# Patient Record
Sex: MALE | Race: WHITE | NOT HISPANIC OR LATINO | Employment: OTHER | URBAN - METROPOLITAN AREA
[De-identification: names, ages, dates, MRNs, and addresses within clinical notes are randomized per-mention and may not be internally consistent; named-entity substitution may affect disease eponyms.]

---

## 2020-03-20 ENCOUNTER — HOSPITAL ENCOUNTER (EMERGENCY)
Facility: HOSPITAL | Age: 60
Discharge: HOME | End: 2020-03-20
Attending: EMERGENCY MEDICINE | Admitting: EMERGENCY MEDICINE

## 2020-03-20 ENCOUNTER — APPOINTMENT (OUTPATIENT)
Dept: GENERAL RADIOLOGY | Facility: HOSPITAL | Age: 60
End: 2020-03-20

## 2020-03-20 VITALS
BODY MASS INDEX: 35.25 KG/M2 | OXYGEN SATURATION: 95 % | RESPIRATION RATE: 24 BRPM | DIASTOLIC BLOOD PRESSURE: 101 MMHG | HEART RATE: 67 BPM | WEIGHT: 238 LBS | TEMPERATURE: 97.7 F | SYSTOLIC BLOOD PRESSURE: 145 MMHG | HEIGHT: 69 IN

## 2020-03-20 DIAGNOSIS — R06.02 SHORTNESS OF BREATH: ICD-10-CM

## 2020-03-20 DIAGNOSIS — I10 ELEVATED BLOOD PRESSURE READING WITH DIAGNOSIS OF HYPERTENSION: ICD-10-CM

## 2020-03-20 DIAGNOSIS — Z72.0 TOBACCO USE: ICD-10-CM

## 2020-03-20 DIAGNOSIS — J98.01 BRONCHOSPASM, ACUTE: ICD-10-CM

## 2020-03-20 DIAGNOSIS — R07.89 CHEST TIGHTNESS: ICD-10-CM

## 2020-03-20 DIAGNOSIS — R05.8 PRODUCTIVE COUGH: Primary | ICD-10-CM

## 2020-03-20 LAB
ALBUMIN SERPL-MCNC: 3.9 G/DL (ref 3.5–5.2)
ALBUMIN/GLOB SERPL: 1.5 G/DL
ALP SERPL-CCNC: 58 U/L (ref 39–117)
ALT SERPL W P-5'-P-CCNC: 44 U/L (ref 1–41)
ANION GAP SERPL CALCULATED.3IONS-SCNC: 10 MMOL/L (ref 5–15)
AST SERPL-CCNC: 35 U/L (ref 1–40)
BASOPHILS # BLD AUTO: 0.02 10*3/MM3 (ref 0–0.2)
BASOPHILS NFR BLD AUTO: 0.5 % (ref 0–1.5)
BILIRUB SERPL-MCNC: 0.2 MG/DL (ref 0.2–1.2)
BILIRUB UR QL STRIP: NEGATIVE
BUN BLD-MCNC: 11 MG/DL (ref 8–23)
BUN/CREAT SERPL: 15.1 (ref 7–25)
CALCIUM SPEC-SCNC: 8.6 MG/DL (ref 8.6–10.5)
CHLORIDE SERPL-SCNC: 106 MMOL/L (ref 98–107)
CLARITY UR: CLEAR
CO2 SERPL-SCNC: 27 MMOL/L (ref 22–29)
COLOR UR: YELLOW
CREAT BLD-MCNC: 0.73 MG/DL (ref 0.76–1.27)
DEPRECATED RDW RBC AUTO: 42.8 FL (ref 37–54)
EOSINOPHIL # BLD AUTO: 0.07 10*3/MM3 (ref 0–0.4)
EOSINOPHIL NFR BLD AUTO: 1.6 % (ref 0.3–6.2)
ERYTHROCYTE [DISTWIDTH] IN BLOOD BY AUTOMATED COUNT: 12.9 % (ref 12.3–15.4)
GFR SERPL CREATININE-BSD FRML MDRD: 109 ML/MIN/1.73
GLOBULIN UR ELPH-MCNC: 2.6 GM/DL
GLUCOSE BLD-MCNC: 153 MG/DL (ref 65–99)
GLUCOSE UR STRIP-MCNC: NEGATIVE MG/DL
HCT VFR BLD AUTO: 43 % (ref 37.5–51)
HGB BLD-MCNC: 14.3 G/DL (ref 13–17.7)
HGB UR QL STRIP.AUTO: NEGATIVE
IMM GRANULOCYTES # BLD AUTO: 0.01 10*3/MM3 (ref 0–0.05)
IMM GRANULOCYTES NFR BLD AUTO: 0.2 % (ref 0–0.5)
KETONES UR QL STRIP: NEGATIVE
LEUKOCYTE ESTERASE UR QL STRIP.AUTO: NEGATIVE
LYMPHOCYTES # BLD AUTO: 1.69 10*3/MM3 (ref 0.7–3.1)
LYMPHOCYTES NFR BLD AUTO: 38.5 % (ref 19.6–45.3)
MCH RBC QN AUTO: 30.1 PG (ref 26.6–33)
MCHC RBC AUTO-ENTMCNC: 33.3 G/DL (ref 31.5–35.7)
MCV RBC AUTO: 90.5 FL (ref 79–97)
MONOCYTES # BLD AUTO: 0.4 10*3/MM3 (ref 0.1–0.9)
MONOCYTES NFR BLD AUTO: 9.1 % (ref 5–12)
NEUTROPHILS # BLD AUTO: 2.2 10*3/MM3 (ref 1.7–7)
NEUTROPHILS NFR BLD AUTO: 50.1 % (ref 42.7–76)
NITRITE UR QL STRIP: NEGATIVE
NRBC BLD AUTO-RTO: 0 /100 WBC (ref 0–0.2)
NT-PROBNP SERPL-MCNC: 456.2 PG/ML (ref 5–900)
PH UR STRIP.AUTO: 7 [PH] (ref 5–8)
PLATELET # BLD AUTO: 180 10*3/MM3 (ref 140–450)
PMV BLD AUTO: 10.9 FL (ref 6–12)
POTASSIUM BLD-SCNC: 3.9 MMOL/L (ref 3.5–5.2)
PROT SERPL-MCNC: 6.5 G/DL (ref 6–8.5)
PROT UR QL STRIP: NEGATIVE
RBC # BLD AUTO: 4.75 10*6/MM3 (ref 4.14–5.8)
SODIUM BLD-SCNC: 143 MMOL/L (ref 136–145)
SP GR UR STRIP: 1.01 (ref 1–1.03)
TROPONIN T SERPL-MCNC: <0.01 NG/ML (ref 0–0.03)
TROPONIN T SERPL-MCNC: <0.01 NG/ML (ref 0–0.03)
UROBILINOGEN UR QL STRIP: NORMAL
WBC NRBC COR # BLD: 4.39 10*3/MM3 (ref 3.4–10.8)

## 2020-03-20 PROCEDURE — 96374 THER/PROPH/DIAG INJ IV PUSH: CPT

## 2020-03-20 PROCEDURE — 80053 COMPREHEN METABOLIC PANEL: CPT | Performed by: EMERGENCY MEDICINE

## 2020-03-20 PROCEDURE — 94799 UNLISTED PULMONARY SVC/PX: CPT

## 2020-03-20 PROCEDURE — 71045 X-RAY EXAM CHEST 1 VIEW: CPT

## 2020-03-20 PROCEDURE — 99284 EMERGENCY DEPT VISIT MOD MDM: CPT

## 2020-03-20 PROCEDURE — 94640 AIRWAY INHALATION TREATMENT: CPT

## 2020-03-20 PROCEDURE — 93005 ELECTROCARDIOGRAM TRACING: CPT | Performed by: EMERGENCY MEDICINE

## 2020-03-20 PROCEDURE — 84484 ASSAY OF TROPONIN QUANT: CPT | Performed by: EMERGENCY MEDICINE

## 2020-03-20 PROCEDURE — 25010000002 METHYLPREDNISOLONE PER 125 MG: Performed by: EMERGENCY MEDICINE

## 2020-03-20 PROCEDURE — 85025 COMPLETE CBC W/AUTO DIFF WBC: CPT | Performed by: EMERGENCY MEDICINE

## 2020-03-20 PROCEDURE — 94664 DEMO&/EVAL PT USE INHALER: CPT

## 2020-03-20 PROCEDURE — 81003 URINALYSIS AUTO W/O SCOPE: CPT | Performed by: EMERGENCY MEDICINE

## 2020-03-20 PROCEDURE — 83880 ASSAY OF NATRIURETIC PEPTIDE: CPT | Performed by: EMERGENCY MEDICINE

## 2020-03-20 RX ORDER — ALBUTEROL SULFATE 90 UG/1
2 AEROSOL, METERED RESPIRATORY (INHALATION) EVERY 4 HOURS PRN
Qty: 1 INHALER | Refills: 0 | Status: SHIPPED | OUTPATIENT
Start: 2020-03-20 | End: 2021-07-16 | Stop reason: SDUPTHER

## 2020-03-20 RX ORDER — BENZONATATE 200 MG/1
200 CAPSULE ORAL 3 TIMES DAILY PRN
Qty: 21 CAPSULE | Refills: 0 | Status: SHIPPED | OUTPATIENT
Start: 2020-03-20 | End: 2020-04-28

## 2020-03-20 RX ORDER — IPRATROPIUM BROMIDE AND ALBUTEROL SULFATE 2.5; .5 MG/3ML; MG/3ML
3 SOLUTION RESPIRATORY (INHALATION) ONCE
Status: COMPLETED | OUTPATIENT
Start: 2020-03-20 | End: 2020-03-20

## 2020-03-20 RX ORDER — PREDNISONE 20 MG/1
20 TABLET ORAL DAILY
Qty: 5 TABLET | Refills: 0 | Status: SHIPPED | OUTPATIENT
Start: 2020-03-20 | End: 2020-04-28

## 2020-03-20 RX ORDER — BUPRENORPHINE HYDROCHLORIDE AND NALOXONE HYDROCHLORIDE DIHYDRATE 8; 2 MG/1; MG/1
0.5 TABLET SUBLINGUAL DAILY
COMMUNITY
End: 2020-12-26

## 2020-03-20 RX ORDER — SODIUM CHLORIDE 0.9 % (FLUSH) 0.9 %
10 SYRINGE (ML) INJECTION AS NEEDED
Status: DISCONTINUED | OUTPATIENT
Start: 2020-03-20 | End: 2020-03-20 | Stop reason: HOSPADM

## 2020-03-20 RX ORDER — AZITHROMYCIN 250 MG/1
TABLET, FILM COATED ORAL
Qty: 6 TABLET | Refills: 0 | Status: SHIPPED | OUTPATIENT
Start: 2020-03-20 | End: 2020-04-28

## 2020-03-20 RX ORDER — METHYLPREDNISOLONE SODIUM SUCCINATE 125 MG/2ML
125 INJECTION, POWDER, LYOPHILIZED, FOR SOLUTION INTRAMUSCULAR; INTRAVENOUS ONCE
Status: COMPLETED | OUTPATIENT
Start: 2020-03-20 | End: 2020-03-20

## 2020-03-20 RX ADMIN — IPRATROPIUM BROMIDE AND ALBUTEROL SULFATE 3 ML: 2.5; .5 SOLUTION RESPIRATORY (INHALATION) at 11:16

## 2020-03-20 RX ADMIN — METHYLPREDNISOLONE SODIUM SUCCINATE 125 MG: 125 INJECTION, POWDER, FOR SOLUTION INTRAMUSCULAR; INTRAVENOUS at 10:27

## 2020-04-27 NOTE — PROGRESS NOTES
"[facility]  [facility]  Telemedicine note    04/28/2020         Alejo Condon  [address] [place] [address]  [unfilled]    1960    Provider, No Known    Alejo Condon is a 60 y.o. male.      Subjective:     Chief Complaint:  Shortness of Breath     You have chosen to receive care through a telephone visit. Do you consent to use a telephone visit for your medical care today? YES    This was an audio enabled telemedicine encounter.    HPI   Patient is a 61 yo male with history of HTN scheduled today for telemedicine evaluation of shortness of breath at the request of Dr. Stevens. Patient presented to the ED 3/20 with a 2 wk hx of constant shortness of breath that was worsening. He notes shortness of breath even at rest but worse with exertion. He notes associated wheezing, diaphoresis, non-productive cough and nausea. Denies chest pain. He has tried albuterol inhaler with minimal relief. He does have a history of pneumonia and bronchitis, but denies asthma. He is an occasional smoker since 9 years old. Work up in ED unremarkable. He was treated with neb treatment, zpack, prednisone and tessalon perles. He later saw the audiologist and was diagnosed with a perforated tympanic membrane. After treatment from the ED he reports his shortness of breath has improved. He reports that he is active with some dyspnea but feels this is improved since treatment for bronchitis. He reports history of an \"enlarged heart\" according to CXRs but does not recall having an echo.   He reports he has HTN but does not take medication. He says his BP typically runs around 150 systolic when it is checked at the suboxone clinic.   He notes occasional ankle swelling and palpitations but neither are bothersome.   Cardiac risks: HTN, hyperlipidemia, age, gender, obesity, tobacco use    Patient Active Problem List   Diagnosis   • Hypertension   • Hyperlipidemia       Past Medical " History:   Diagnosis Date   • Hyperlipidemia    • Hypertension    • Opiate addiction (CMS/HCC)     Resolved since on suboxone       Past Surgical History:   Procedure Laterality Date   • FINGER SURGERY         Family History   Problem Relation Age of Onset   • Hypertension Mother    • Hyperlipidemia Mother    • Heart attack Father 88   • Cardiomyopathy Brother 36        Patient reports he had SCD that they attributed to agent orange       Social History     Socioeconomic History   • Marital status:      Spouse name: Not on file   • Number of children: Not on file   • Years of education: Not on file   • Highest education level: Not on file   Tobacco Use   • Smoking status: Current Some Day Smoker     Packs/day: 0.50   Substance and Sexual Activity   • Alcohol use: Yes     Alcohol/week: 2.0 standard drinks     Types: 2 Cans of beer per week     Drinks per session: 1 or 2     Binge frequency: Never   • Drug use: Yes     Types: Marijuana       No Known Allergies      Current Outpatient Medications:   •  ofloxacin (FLOXIN) 0.3 % otic solution, Administer 5 drops into the left ear Daily., Disp: , Rfl:   •  albuterol sulfate  (90 Base) MCG/ACT inhaler, Inhale 2 puffs Every 4 (Four) Hours As Needed for Wheezing or Shortness of Air., Disp: 1 inhaler, Rfl: 0  •  buprenorphine-naloxone (SUBOXONE) 8-2 MG per SL tablet, Place 0.5 tablets under the tongue Daily., Disp: , Rfl:   •  losartan (Cozaar) 25 MG tablet, Take 1 tablet by mouth Daily., Disp: 30 tablet, Rfl: 3    The following portions of the patient's history were reviewed today and updated as appropriate: allergies, current medications, past family history, past medical history, past social history, past surgical history and problem list     Review of Systems   Constitution: Positive for night sweats. Negative for chills and fever.   HENT: Positive for ear pain.    Eyes: Negative.    Cardiovascular: Positive for dyspnea on exertion, leg swelling  "(occasional) and palpitations. Negative for chest pain, claudication, cyanosis, irregular heartbeat, near-syncope, orthopnea, paroxysmal nocturnal dyspnea and syncope.   Respiratory: Negative for cough, shortness of breath and snoring.    Endocrine: Negative.    Hematologic/Lymphatic: Does not bruise/bleed easily.   Skin: Negative for poor wound healing.   Musculoskeletal: Negative.    Gastrointestinal: Negative for abdominal pain, heartburn, hematemesis, melena, nausea and vomiting.   Genitourinary: Negative.  Negative for hematuria.   Neurological: Negative.  Negative for dizziness and light-headedness.   Psychiatric/Behavioral: Positive for substance abuse (marijuana). The patient is nervous/anxious.    Allergic/Immunologic: Negative.        Objective:     Vitals:    04/28/20 0853   Weight: 106 kg (234 lb)   Height: 175.3 cm (69\")       Body mass index is 34.56 kg/m².    Physical Exam   Constitutional: He is oriented to person, place, and time.   Neurological: He is alert and oriented to person, place, and time.   Psychiatric: He has a normal mood and affect. His behavior is normal. Judgment and thought content normal.       Lab and Diagnostic Review:  Results for orders placed or performed during the hospital encounter of 03/20/20   Comprehensive Metabolic Panel   Result Value Ref Range    Glucose 153 (H) 65 - 99 mg/dL    BUN 11 8 - 23 mg/dL    Creatinine 0.73 (L) 0.76 - 1.27 mg/dL    Sodium 143 136 - 145 mmol/L    Potassium 3.9 3.5 - 5.2 mmol/L    Chloride 106 98 - 107 mmol/L    CO2 27.0 22.0 - 29.0 mmol/L    Calcium 8.6 8.6 - 10.5 mg/dL    Total Protein 6.5 6.0 - 8.5 g/dL    Albumin 3.90 3.50 - 5.20 g/dL    ALT (SGPT) 44 (H) 1 - 41 U/L    AST (SGOT) 35 1 - 40 U/L    Alkaline Phosphatase 58 39 - 117 U/L    Total Bilirubin 0.2 0.2 - 1.2 mg/dL    eGFR Non African Amer 109 >60 mL/min/1.73    Globulin 2.6 gm/dL    A/G Ratio 1.5 g/dL    BUN/Creatinine Ratio 15.1 7.0 - 25.0    Anion Gap 10.0 5.0 - 15.0 mmol/L "   Urinalysis With Microscopic If Indicated (No Culture) - Urine, Clean Catch   Result Value Ref Range    Color, UA Yellow Yellow, Straw    Appearance, UA Clear Clear    pH, UA 7.0 5.0 - 8.0    Specific Gravity, UA 1.014 1.001 - 1.030    Glucose, UA Negative Negative    Ketones, UA Negative Negative    Bilirubin, UA Negative Negative    Blood, UA Negative Negative    Protein, UA Negative Negative    Leuk Esterase, UA Negative Negative    Nitrite, UA Negative Negative    Urobilinogen, UA 0.2 E.U./dL 0.2 - 1.0 E.U./dL   BNP   Result Value Ref Range    proBNP 456.2 5.0 - 900.0 pg/mL   Troponin   Result Value Ref Range    Troponin T <0.010 0.000 - 0.030 ng/mL   CBC Auto Differential   Result Value Ref Range    WBC 4.39 3.40 - 10.80 10*3/mm3    RBC 4.75 4.14 - 5.80 10*6/mm3    Hemoglobin 14.3 13.0 - 17.7 g/dL    Hematocrit 43.0 37.5 - 51.0 %    MCV 90.5 79.0 - 97.0 fL    MCH 30.1 26.6 - 33.0 pg    MCHC 33.3 31.5 - 35.7 g/dL    RDW 12.9 12.3 - 15.4 %    RDW-SD 42.8 37.0 - 54.0 fl    MPV 10.9 6.0 - 12.0 fL    Platelets 180 140 - 450 10*3/mm3    Neutrophil % 50.1 42.7 - 76.0 %    Lymphocyte % 38.5 19.6 - 45.3 %    Monocyte % 9.1 5.0 - 12.0 %    Eosinophil % 1.6 0.3 - 6.2 %    Basophil % 0.5 0.0 - 1.5 %    Immature Grans % 0.2 0.0 - 0.5 %    Neutrophils, Absolute 2.20 1.70 - 7.00 10*3/mm3    Lymphocytes, Absolute 1.69 0.70 - 3.10 10*3/mm3    Monocytes, Absolute 0.40 0.10 - 0.90 10*3/mm3    Eosinophils, Absolute 0.07 0.00 - 0.40 10*3/mm3    Basophils, Absolute 0.02 0.00 - 0.20 10*3/mm3    Immature Grans, Absolute 0.01 0.00 - 0.05 10*3/mm3    nRBC 0.0 0.0 - 0.2 /100 WBC   Troponin   Result Value Ref Range    Troponin T <0.010 0.000 - 0.030 ng/mL   CXR 3/20/20  Heart is enlarged with chronic changes seen within the lung  fields and slight prominence of the pulmonary vascularity. No acute  parenchymal disease.    EKG 3/20/20   Normal sinus rhythm  T wave abnormality, consider lateral ischemia  Prolonged QT    Assessment and Plan:    1. DAMIAN (dyspnea on exertion)  Symptoms improved after treatment for bronchitis. However, due to cardiomegaly on CXR and family hx will proceed with echo.   - Adult Transthoracic Echo Complete W/ Cont if Necessary Per Protocol; Future    2. Essential hypertension  Uncontrolled. Reports that he stopped taking antihypertensives because he didn't like how they made him feel. He believes he was on lisinopril. Due to potential cardiomyopathy, will try losartan. If echo WNL and if he doesn't tolerate, will consider amlodipine  Continue to monitor closely at suboxone clinic visits  - Adult Transthoracic Echo Complete W/ Cont if Necessary Per Protocol; Future    3. Cardiomegaly  - Adult Transthoracic Echo Complete W/ Cont if Necessary Per Protocol; Future    4. Prolonged Q-T interval on ECG  Will repeat EKG at next office visit    Patient to fu in 4-6 weeks in office with EKG. Will consider further cardiac evaluation at that time pending echo results and symptoms      This visit has been scheduled as a telephonevisit to comply with patient safety concerns in accordance with CDC recommendations. Total time of discussion was 30 minutes.      It has been a pleasure to participate in the care of this patient.  Patient was instructed to call the [facility] with any questions, concerns, or worsening symptoms.    *Please note that portions of this note were completed with a voice recognition program. Efforts were made to edit the dictations, but occasionally words are mistranscribed.

## 2020-04-28 ENCOUNTER — OFFICE VISIT (OUTPATIENT)
Dept: CARDIOLOGY | Facility: HOSPITAL | Age: 60
End: 2020-04-28

## 2020-04-28 VITALS — HEIGHT: 69 IN | BODY MASS INDEX: 34.66 KG/M2 | WEIGHT: 234 LBS

## 2020-04-28 DIAGNOSIS — R06.09 DOE (DYSPNEA ON EXERTION): Primary | ICD-10-CM

## 2020-04-28 DIAGNOSIS — I51.7 CARDIOMEGALY: ICD-10-CM

## 2020-04-28 DIAGNOSIS — R94.31 PROLONGED Q-T INTERVAL ON ECG: ICD-10-CM

## 2020-04-28 DIAGNOSIS — I10 ESSENTIAL HYPERTENSION: ICD-10-CM

## 2020-04-28 PROCEDURE — 99443 PR PHYS/QHP TELEPHONE EVALUATION 21-30 MIN: CPT | Performed by: NURSE PRACTITIONER

## 2020-04-28 RX ORDER — OFLOXACIN 3 MG/ML
5 SOLUTION AURICULAR (OTIC) DAILY
COMMUNITY
End: 2020-05-29

## 2020-04-28 RX ORDER — LOSARTAN POTASSIUM 25 MG/1
25 TABLET ORAL DAILY
Qty: 30 TABLET | Refills: 3 | Status: SHIPPED | OUTPATIENT
Start: 2020-04-28 | End: 2020-05-06 | Stop reason: ALTCHOICE

## 2020-05-06 ENCOUNTER — HOSPITAL ENCOUNTER (OUTPATIENT)
Dept: CARDIOLOGY | Facility: HOSPITAL | Age: 60
Discharge: HOME | End: 2020-05-06
Admitting: NURSE PRACTITIONER

## 2020-05-06 ENCOUNTER — TELEPHONE (OUTPATIENT)
Dept: CARDIOLOGY | Facility: HOSPITAL | Age: 60
End: 2020-05-06

## 2020-05-06 VITALS
HEIGHT: 69 IN | BODY MASS INDEX: 34.29 KG/M2 | SYSTOLIC BLOOD PRESSURE: 149 MMHG | WEIGHT: 231.48 LBS | DIASTOLIC BLOOD PRESSURE: 100 MMHG

## 2020-05-06 DIAGNOSIS — I10 ESSENTIAL HYPERTENSION: ICD-10-CM

## 2020-05-06 DIAGNOSIS — I50.21 ACUTE SYSTOLIC CHF (CONGESTIVE HEART FAILURE) (HCC): Primary | ICD-10-CM

## 2020-05-06 DIAGNOSIS — Z00.00 HEALTH CARE MAINTENANCE: ICD-10-CM

## 2020-05-06 DIAGNOSIS — R06.09 DOE (DYSPNEA ON EXERTION): ICD-10-CM

## 2020-05-06 DIAGNOSIS — I51.7 CARDIOMEGALY: ICD-10-CM

## 2020-05-06 LAB
BH CV ECHO MEAS - ACS: 1.9 CM
BH CV ECHO MEAS - AO MAX PG (FULL): 3.3 MMHG
BH CV ECHO MEAS - AO MAX PG: 4.6 MMHG
BH CV ECHO MEAS - AO MEAN PG (FULL): 1.6 MMHG
BH CV ECHO MEAS - AO MEAN PG: 2.3 MMHG
BH CV ECHO MEAS - AO ROOT AREA (BSA CORRECTED): 1.6
BH CV ECHO MEAS - AO ROOT AREA: 10 CM^2
BH CV ECHO MEAS - AO ROOT DIAM: 3.6 CM
BH CV ECHO MEAS - AO V2 MAX: 107.6 CM/SEC
BH CV ECHO MEAS - AO V2 MEAN: 70.1 CM/SEC
BH CV ECHO MEAS - AO V2 VTI: 21.7 CM
BH CV ECHO MEAS - ASC AORTA: 3.4 CM
BH CV ECHO MEAS - AVA(I,A): 2.8 CM^2
BH CV ECHO MEAS - AVA(I,D): 2.8 CM^2
BH CV ECHO MEAS - AVA(V,A): 2.3 CM^2
BH CV ECHO MEAS - AVA(V,D): 2.3 CM^2
BH CV ECHO MEAS - BSA(HAYCOCK): 2.3 M^2
BH CV ECHO MEAS - BSA: 2.2 M^2
BH CV ECHO MEAS - BZI_BMI: 34.4 KILOGRAMS/M^2
BH CV ECHO MEAS - BZI_METRIC_HEIGHT: 175.3 CM
BH CV ECHO MEAS - BZI_METRIC_WEIGHT: 105.7 KG
BH CV ECHO MEAS - EDV(CUBED): 201.5 ML
BH CV ECHO MEAS - EDV(MOD-SP2): 200 ML
BH CV ECHO MEAS - EDV(MOD-SP4): 197 ML
BH CV ECHO MEAS - EDV(TEICH): 170.7 ML
BH CV ECHO MEAS - EF(CUBED): 33.5 %
BH CV ECHO MEAS - EF(MOD-BP): 31 %
BH CV ECHO MEAS - EF(MOD-SP2): 27.5 %
BH CV ECHO MEAS - EF(MOD-SP4): 31.5 %
BH CV ECHO MEAS - EF(TEICH): 26.9 %
BH CV ECHO MEAS - ESV(CUBED): 134.1 ML
BH CV ECHO MEAS - ESV(MOD-SP2): 145 ML
BH CV ECHO MEAS - ESV(MOD-SP4): 135 ML
BH CV ECHO MEAS - ESV(TEICH): 124.8 ML
BH CV ECHO MEAS - FS: 12.7 %
BH CV ECHO MEAS - IVS/LVPW: 0.95
BH CV ECHO MEAS - IVSD: 0.99 CM
BH CV ECHO MEAS - LA DIMENSION: 4.3 CM
BH CV ECHO MEAS - LA/AO: 1.2
BH CV ECHO MEAS - LAD MAJOR: 6 CM
BH CV ECHO MEAS - LAT PEAK E' VEL: 5.3 CM/SEC
BH CV ECHO MEAS - LATERAL E/E' RATIO: 19.6
BH CV ECHO MEAS - LV DIASTOLIC VOL/BSA (35-75): 89.4 ML/M^2
BH CV ECHO MEAS - LV MASS(C)D: 241.3 GRAMS
BH CV ECHO MEAS - LV MASS(C)DI: 109.5 GRAMS/M^2
BH CV ECHO MEAS - LV MAX PG: 1.3 MMHG
BH CV ECHO MEAS - LV MEAN PG: 0.67 MMHG
BH CV ECHO MEAS - LV SYSTOLIC VOL/BSA (12-30): 61.2 ML/M^2
BH CV ECHO MEAS - LV V1 MAX: 57.3 CM/SEC
BH CV ECHO MEAS - LV V1 MEAN: 37.7 CM/SEC
BH CV ECHO MEAS - LV V1 VTI: 13.8 CM
BH CV ECHO MEAS - LVIDD: 5.9 CM
BH CV ECHO MEAS - LVIDS: 5.1 CM
BH CV ECHO MEAS - LVLD AP2: 9.8 CM
BH CV ECHO MEAS - LVLD AP4: 9.2 CM
BH CV ECHO MEAS - LVLS AP2: 8.8 CM
BH CV ECHO MEAS - LVLS AP4: 8.3 CM
BH CV ECHO MEAS - LVOT AREA (M): 4.5 CM^2
BH CV ECHO MEAS - LVOT AREA: 4.4 CM^2
BH CV ECHO MEAS - LVOT DIAM: 2.4 CM
BH CV ECHO MEAS - LVPWD: 1 CM
BH CV ECHO MEAS - MED PEAK E' VEL: 4.3 CM/SEC
BH CV ECHO MEAS - MEDIAL E/E' RATIO: 23.9
BH CV ECHO MEAS - MV A MAX VEL: 41.5 CM/SEC
BH CV ECHO MEAS - MV DEC SLOPE: 256.2 CM/SEC^2
BH CV ECHO MEAS - MV DEC TIME: 0.17 SEC
BH CV ECHO MEAS - MV E MAX VEL: 105.1 CM/SEC
BH CV ECHO MEAS - MV E/A: 2.5
BH CV ECHO MEAS - MV P1/2T MAX VEL: 118.3 CM/SEC
BH CV ECHO MEAS - MV P1/2T: 135.3 MSEC
BH CV ECHO MEAS - MVA P1/2T LCG: 1.9 CM^2
BH CV ECHO MEAS - MVA(P1/2T): 1.6 CM^2
BH CV ECHO MEAS - PA ACC SLOPE: 758.9 CM/SEC^2
BH CV ECHO MEAS - PA ACC TIME: 0.1 SEC
BH CV ECHO MEAS - PA PR(ACCEL): 33.1 MMHG
BH CV ECHO MEAS - RAP SYSTOLE: 8 MMHG
BH CV ECHO MEAS - RVSP: 34 MMHG
BH CV ECHO MEAS - SI(AO): 98.9 ML/M^2
BH CV ECHO MEAS - SI(CUBED): 30.6 ML/M^2
BH CV ECHO MEAS - SI(LVOT): 27.7 ML/M^2
BH CV ECHO MEAS - SI(MOD-SP2): 25 ML/M^2
BH CV ECHO MEAS - SI(MOD-SP4): 28.1 ML/M^2
BH CV ECHO MEAS - SI(TEICH): 20.8 ML/M^2
BH CV ECHO MEAS - SV(AO): 218.1 ML
BH CV ECHO MEAS - SV(CUBED): 67.4 ML
BH CV ECHO MEAS - SV(LVOT): 61 ML
BH CV ECHO MEAS - SV(MOD-SP2): 55 ML
BH CV ECHO MEAS - SV(MOD-SP4): 62 ML
BH CV ECHO MEAS - SV(TEICH): 45.9 ML
BH CV ECHO MEAS - TAPSE (>1.6): 2.3 CM2
BH CV ECHO MEAS - TR MAX PG: 26 MMHG
BH CV ECHO MEAS - TR MAX VEL: 254 CM/SEC
BH CV ECHO MEASUREMENTS AVERAGE E/E' RATIO: 21.9
BH CV VAS BP LEFT ARM: NORMAL MMHG
BH CV XLRA - RV BASE: 4.6 CM
BH CV XLRA - RV LENGTH: 7.8 CM
BH CV XLRA - RV MID: 4.5 CM
LEFT ATRIUM VOLUME INDEX: 45.8 ML/M^2
LEFT ATRIUM VOLUME: 101 ML

## 2020-05-06 PROCEDURE — 93306 TTE W/DOPPLER COMPLETE: CPT | Performed by: INTERNAL MEDICINE

## 2020-05-06 PROCEDURE — 93306 TTE W/DOPPLER COMPLETE: CPT

## 2020-05-06 RX ORDER — CARVEDILOL 3.12 MG/1
3.12 TABLET ORAL 2 TIMES DAILY
Qty: 180 TABLET | Refills: 3 | Status: ON HOLD | OUTPATIENT
Start: 2020-05-06 | End: 2020-08-25 | Stop reason: SDUPTHER

## 2020-05-06 NOTE — TELEPHONE ENCOUNTER
"Called patient to review echo results which were abnormal for an EF of 31-35%, global hypokinesis, grade III diastolic dysfunction with high LAP. Patient reports that he was told his heart was enlarged years ago but never told he had CHF and has never seen a cardiologist. Discussed CHF including possible causes, s/s and risks. Recommend ischemic evaluation and referral to cardiology for urgent LHC. I also recommended starting Entresto and carvedilol. Of note, he never picked up prescription for losartan. I have called the pharmacy and canceled this prescription. Advised to  prescriptions ASAP. I discussed risks of arrhythmias and SCD. Recommended LifeVest and he deferred. He says he feels fine and \"I am not sure I really give a damn\". He says when it is his time to go he wants to go. I have recommended that he come into the clinic this week to discuss findings, medications and future care as well as evaluate BP since I have never seen this patient face to face. He agrees to this.     "

## 2020-05-07 PROBLEM — I42.0 CARDIOMYOPATHY, DILATED: Status: ACTIVE | Noted: 2020-05-07

## 2020-05-08 ENCOUNTER — OFFICE VISIT (OUTPATIENT)
Dept: CARDIOLOGY | Facility: HOSPITAL | Age: 60
End: 2020-05-08

## 2020-05-08 ENCOUNTER — HOSPITAL ENCOUNTER (OUTPATIENT)
Dept: CARDIOLOGY | Facility: HOSPITAL | Age: 60
Discharge: HOME | End: 2020-05-08
Admitting: NURSE PRACTITIONER

## 2020-05-08 VITALS
BODY MASS INDEX: 33.79 KG/M2 | OXYGEN SATURATION: 97 % | SYSTOLIC BLOOD PRESSURE: 142 MMHG | HEART RATE: 70 BPM | DIASTOLIC BLOOD PRESSURE: 84 MMHG | HEIGHT: 69 IN | TEMPERATURE: 97.2 F | WEIGHT: 228.13 LBS | RESPIRATION RATE: 18 BRPM

## 2020-05-08 DIAGNOSIS — I42.0 CARDIOMYOPATHY, DILATED (HCC): ICD-10-CM

## 2020-05-08 DIAGNOSIS — I50.21 ACUTE SYSTOLIC CHF (CONGESTIVE HEART FAILURE) (HCC): Primary | ICD-10-CM

## 2020-05-08 DIAGNOSIS — R94.31 PROLONGED Q-T INTERVAL ON ECG: ICD-10-CM

## 2020-05-08 DIAGNOSIS — E78.5 HYPERLIPIDEMIA, UNSPECIFIED HYPERLIPIDEMIA TYPE: ICD-10-CM

## 2020-05-08 DIAGNOSIS — I10 ESSENTIAL HYPERTENSION: ICD-10-CM

## 2020-05-08 PROCEDURE — 93005 ELECTROCARDIOGRAM TRACING: CPT | Performed by: NURSE PRACTITIONER

## 2020-05-08 PROCEDURE — 93010 ELECTROCARDIOGRAM REPORT: CPT | Performed by: INTERNAL MEDICINE

## 2020-05-08 PROCEDURE — 99214 OFFICE O/P EST MOD 30 MIN: CPT | Performed by: NURSE PRACTITIONER

## 2020-05-08 RX ORDER — SPIRONOLACTONE 25 MG/1
25 TABLET ORAL DAILY
Qty: 30 TABLET | Refills: 3 | Status: SHIPPED | OUTPATIENT
Start: 2020-05-08 | End: 2020-12-26

## 2020-05-08 NOTE — PROGRESS NOTES
"[facility]  [facility]      2020         Alejo Condon  [address] [place] [address]  [unfilled]    1960    Provider, No Known    Alejo Condon is a 60 y.o. male.      Subjective:     Chief Complaint:  Shortness of Breath and Follow-up       HPI   Patient is a 60-year-old male with history of hypertension, hyperlipidemia and previous opiate addiction currently on Suboxone who presents today for evaluation of newly diagnosed systolic heart failure.  Patient presented the ED on 3/20 with a 2-week history of shortness of breath and was treated with nebulizers, Z-Khang, prednisone and Tessalon Perles and symptoms improved.  His chest x-ray showed cardiomegaly and therefore an echo was performed showing a moderately decreased LVEF of 31 to 35%, global left ventricular hypokinesis, grade 3 diastolic dysfunction with high LAP and moderately increased left atrial volume, moderately dilated RV. He was started on entresto and coreg 5/6 over the phone and was advised to follow up in clinic for in-person evaluation.     He says he has had an enlarged heart for years-found on [facility]R at [facility]. He says he had an echo 30 years ago and was told it was enlarged but was never told he had CHF or referred to cardiology. No hx of [facility]. His brother had an \"enlarged heart\" but he was told it was due to agent orange, he  at age 36. He reports high blood pressure for past few years, he says he is not \"big on taking medicines\" so he never took them regularly.  To heavy alcohol abuse.  Previously was drinking 1/5 of liquor currently drinking about 12 beers a day.  He smokes about a half a pack a day.  He denies any significant shortness of breath.  He reports that he sleeps on an incline chronically due to history of GERD and out of fear of aspiration when he drinks heavily.  He is not aware of any significant weight gain.  He does not have a blood pressure cuff but reports systolic " blood pressure typically in the 170s at his Suboxone clinic.  He has a history of opiate abuse but denies any current illicit drug use.     Cardiac risks: HTN, HLD, tobacco abuse, age, obesity  Patient Active Problem List   Diagnosis   • Hypertension   • Hyperlipidemia   • Cardiomyopathy, dilated (CMS/HCC)       Past Medical History:   Diagnosis Date   • Hyperlipidemia    • Hypertension    • Opiate addiction (CMS/HCC)     Resolved since on suboxone       Past Surgical History:   Procedure Laterality Date   • FINGER SURGERY         Family History   Problem Relation Age of Onset   • Hypertension Mother    • Hyperlipidemia Mother    • Heart failure Mother    • Heart attack Father 88   • Cardiomyopathy Brother 36        Patient reports he had SCD that they attributed to agent orange       Social History     Socioeconomic History   • Marital status:      Spouse name: Not on file   • Number of children: Not on file   • Years of education: Not on file   • Highest education level: Not on file   Tobacco Use   • Smoking status: Current Every Day Smoker     Packs/day: 0.50   • Smokeless tobacco: Never Used   Substance and Sexual Activity   • Alcohol use: Yes     Alcohol/week: 12.0 standard drinks     Types: 12 Cans of beer per week     Drinks per session: 10 or more     Binge frequency: Never   • Drug use: Yes     Types: Marijuana   • Sexual activity: Defer   Social History Narrative    caffeine use: 2 servings of coffee daily       No Known Allergies      Current Outpatient Medications:   •  albuterol sulfate  (90 Base) MCG/ACT inhaler, Inhale 2 puffs Every 4 (Four) Hours As Needed for Wheezing or Shortness of Air., Disp: 1 inhaler, Rfl: 0  •  buprenorphine-naloxone (SUBOXONE) 8-2 MG per SL tablet, Place 0.5 tablets under the tongue Daily., Disp: , Rfl:   •  carvedilol (COREG) 3.125 MG tablet, Take 1 tablet by mouth 2 (Two) Times a Day., Disp: 180 tablet, Rfl: 3  •  ofloxacin (FLOXIN) 0.3 % otic solution,  "Administer 5 drops into the left ear Daily., Disp: , Rfl:   •  sacubitril-valsartan (Entresto) 24-26 MG tablet, Take 1 tablet by mouth 2 (Two) Times a Day., Disp: 60 tablet, Rfl: 3  •  spironolactone (ALDACTONE) 25 MG tablet, Take 1 tablet by mouth Daily., Disp: 30 tablet, Rfl: 3    The following portions of the patient's history were reviewed today and updated as appropriate: allergies, current medications, past family history, past medical history, past social history, past surgical history and problem list     Review of Systems   Constitution: Positive for malaise/fatigue. Negative for chills and fever.   HENT: Negative.    Eyes: Negative.    Cardiovascular: Positive for dyspnea on exertion. Negative for chest pain, claudication, cyanosis, irregular heartbeat, leg swelling, near-syncope, orthopnea, palpitations, paroxysmal nocturnal dyspnea and syncope.   Respiratory: Negative for cough, shortness of breath and snoring.    Endocrine: Negative.    Hematologic/Lymphatic: Does not bruise/bleed easily.   Skin: Negative for poor wound healing.   Musculoskeletal: Negative.    Gastrointestinal: Negative for abdominal pain, heartburn, hematemesis, melena, nausea and vomiting.   Genitourinary: Negative.  Negative for hematuria.   Neurological: Negative.    Psychiatric/Behavioral: Negative.    Allergic/Immunologic: Negative.        Objective:     Vitals:    05/08/20 0842   BP: 142/84   BP Location: Left arm   Patient Position: Sitting   Cuff Size: Adult   Pulse: 70   Resp: 18   Temp: 97.2 °F (36.2 °C)   TempSrc: Temporal   SpO2: 97%   Weight: 103 kg (228 lb 2 oz)   Height: 175.3 cm (69\")       Body mass index is 33.69 kg/m².    Physical Exam   Constitutional: He is oriented to person, place, and time. He appears well-developed and well-nourished. No distress.   HENT:   Head: Normocephalic.   Eyes: Pupils are equal, round, and reactive to light. Conjunctivae are normal.   Neck: Neck supple. No JVD present. No thyromegaly " present.   Cardiovascular: Normal rate, regular rhythm, normal heart sounds and intact distal pulses. Exam reveals no gallop and no friction rub.   No murmur heard.  Pulmonary/Chest: Effort normal. No respiratory distress. He has decreased breath sounds. He has wheezes. He has no rales. He exhibits no tenderness.   Abdominal: Soft. Bowel sounds are normal.   Musculoskeletal: Normal range of motion. He exhibits edema (2+ BLE).   Neurological: He is alert and oriented to person, place, and time.   Skin: Skin is warm and dry.   Psychiatric: He has a normal mood and affect. His behavior is normal. Thought content normal.   Vitals reviewed.      Lab and Diagnostic Review:  CXR 3/20/20  IMPRESSION:  Heart is enlarged with chronic changes seen within the lung  fields and slight prominence of the pulmonary vascularity. No acute  parenchymal disease.  Lab Results   Component Value Date    GLUCOSE 153 (H) 03/20/2020    CALCIUM 8.6 03/20/2020     03/20/2020    K 3.9 03/20/2020    CO2 27.0 03/20/2020     03/20/2020    BUN 11 03/20/2020    CREATININE 0.73 (L) 03/20/2020    EGFRIFNONA 109 03/20/2020    BCR 15.1 03/20/2020    ANIONGAP 10.0 03/20/2020   Echo 5/6/20  · Left ventricular systolic function is moderately decreased. Calculated EF = 31.0%. Estimated EF appears to be in the range of 31 - 35%.  · There is left ventricular global hypokinesis noted. The left ventricular cavity is borderline dilated.  · Left ventricular diastolic dysfunction is noted (grade III w/high LAP) consistent with reversible restrictive pattern  · Left atrial volume is moderately increased.  · Mild tricuspid valve regurgitation is present. Estimated right ventricular systolic pressure from tricuspid regurgitation is normal (<35 mmHg).    EKG: Normal sinus rhythm, T wave abnormality consider lateral ischemia.  Prolonged QT     Assessment and Plan:   1. Acute systolic CHF (congestive heart failure) (CMS/HCC)  Appears mildly fluid  "overloaded  Will add spironolactone, but will likely need lasix. He admits he doesn't like taking medicines, but I have talked to him extensively about his diagnosis and risks of not treating  Heart failure education provided today including signs and symptoms, causes of heart failure, medications, daily weights, low sodium diet (less than 2000 mg per day), 1.5L fluid restriction and daily physical activity as tolerated. Reviewed HF Zones with patient   Encouraged alcohol cessation  Continue coreg and entresto, will increase at next visit pending blood pressure response and labs after the addition of spironolactone  Urgent referral placed for cardiology evaluation and C    2. Cardiomyopathy, dilated (CMS/HCC)  EF 31-35%. Fhx of dilated cardiomyopathy in family. Patient has known hx of ETOH abuse  Needs ischemic evaluation  I have again discussed risks for SCD and recommended LifeVest. He wants to think about it. He understands risks, but would prefer to \"research\" it first    3. Essential hypertension  Uncontrolled.  Add spironolactone.  Will likely uptitrate his carvedilol and or Entresto at next visit.  Provided patient with a free blood pressure cuff.  Encourage daily monitoring and to call if systolic blood pressure consistently greater than 130    4. Hyperlipidemia  We will check lipids at next visit    RV 1 week    It has been a pleasure to participate in the care of this patient.  Patient was instructed to call the [facility] with any questions, concerns, or worsening symptoms.    *Please note that portions of this note were completed with a voice recognition program. Efforts were made to edit the dictations, but occasionally words are mistranscribed.      "

## 2020-05-12 NOTE — TELEPHONE ENCOUNTER
Called patient back and discussed LifeVest. He is willing to wear. I explained to him that I do not know what is copay is and he will have to check with his insurance. Advised him to not donate blood. He says that his BPs are improving with SBPs in the 130-140s and HRs in the 60s. He is to see Dr. Trejo on Thursday. Advised him to have labs rechecked at that time and Dr. Trejo or myself will consider uptitrating therapy or possibly starting lasix. Patient to call with any new or worsening symptoms

## 2020-05-12 NOTE — TELEPHONE ENCOUNTER
Patient called and states that he is wanting to get more information on how he gets a Lifevest. He states that he is interested in getting fitted. His main concerns were insurance copays.

## 2020-05-13 PROBLEM — I50.21 ACUTE SYSTOLIC CHF (CONGESTIVE HEART FAILURE): Status: ACTIVE | Noted: 2020-05-13

## 2020-05-15 ENCOUNTER — DOCUMENTATION (OUTPATIENT)
Dept: CARDIOLOGY | Facility: HOSPITAL | Age: 60
End: 2020-05-15

## 2020-05-18 ENCOUNTER — TELEPHONE (OUTPATIENT)
Dept: CARDIOLOGY | Facility: HOSPITAL | Age: 60
End: 2020-05-18

## 2020-05-18 NOTE — TELEPHONE ENCOUNTER
"Called patient to check on him after I noticed that he was a \"no show\" to Dr. Trejo's appt. He says he had a lot going on that day and unfortunately missed it. He says he rescheduled it for next week but I don't see an appt. Again, I emphasized the importance of his need for cardiology evaluation and further cardiac care for his newly diagnosed systolic heart failure.  He verbalizes understanding of this. He says that he is tolerating medications well and blood pressure has been improving.  He is writing his blood pressures down and plans to bring to his next visit.  I have advised him to get labs checked on 5/20 when he comes in to see primary care.  I have also advised him to call cardiology to make sure he does have an upcoming appt  "

## 2020-05-19 NOTE — TELEPHONE ENCOUNTER
Patient states that he called to verify his appointment with cardiology which is on the 29th and states that he did get a Lifevest.

## 2020-05-20 ENCOUNTER — LAB (OUTPATIENT)
Dept: LAB | Facility: HOSPITAL | Age: 60
End: 2020-05-20

## 2020-05-20 ENCOUNTER — TELEPHONE (OUTPATIENT)
Dept: INTERNAL MEDICINE | Facility: CLINIC | Age: 60
End: 2020-05-20

## 2020-05-20 ENCOUNTER — OFFICE VISIT (OUTPATIENT)
Dept: INTERNAL MEDICINE | Facility: CLINIC | Age: 60
End: 2020-05-20

## 2020-05-20 VITALS
HEIGHT: 69 IN | BODY MASS INDEX: 34.36 KG/M2 | SYSTOLIC BLOOD PRESSURE: 132 MMHG | HEART RATE: 57 BPM | DIASTOLIC BLOOD PRESSURE: 86 MMHG | OXYGEN SATURATION: 93 % | TEMPERATURE: 97.2 F | WEIGHT: 232 LBS

## 2020-05-20 DIAGNOSIS — R61 DIAPHORESIS: ICD-10-CM

## 2020-05-20 DIAGNOSIS — G89.29 CHRONIC LEFT SHOULDER PAIN: ICD-10-CM

## 2020-05-20 DIAGNOSIS — F10.10 ALCOHOL ABUSE: ICD-10-CM

## 2020-05-20 DIAGNOSIS — I10 ESSENTIAL HYPERTENSION: Primary | ICD-10-CM

## 2020-05-20 DIAGNOSIS — F11.11 HISTORY OF OPIOID ABUSE (HCC): ICD-10-CM

## 2020-05-20 DIAGNOSIS — E78.5 HYPERLIPIDEMIA, UNSPECIFIED HYPERLIPIDEMIA TYPE: ICD-10-CM

## 2020-05-20 DIAGNOSIS — Z72.0 TOBACCO ABUSE: ICD-10-CM

## 2020-05-20 DIAGNOSIS — R22.1 LUMP ON NECK: ICD-10-CM

## 2020-05-20 DIAGNOSIS — I50.21 ACUTE SYSTOLIC CHF (CONGESTIVE HEART FAILURE) (HCC): ICD-10-CM

## 2020-05-20 DIAGNOSIS — I50.20 SYSTOLIC CONGESTIVE HEART FAILURE, UNSPECIFIED HF CHRONICITY (HCC): ICD-10-CM

## 2020-05-20 DIAGNOSIS — M25.512 CHRONIC LEFT SHOULDER PAIN: ICD-10-CM

## 2020-05-20 DIAGNOSIS — R73.03 PREDIABETES: ICD-10-CM

## 2020-05-20 LAB
ALBUMIN SERPL-MCNC: 4.2 G/DL (ref 3.5–5.2)
ALBUMIN UR-MCNC: <1.2 MG/DL
ALBUMIN/GLOB SERPL: 1.6 G/DL
ALP SERPL-CCNC: 45 U/L (ref 39–117)
ALT SERPL W P-5'-P-CCNC: 12 U/L (ref 1–41)
ANION GAP SERPL CALCULATED.3IONS-SCNC: 10.1 MMOL/L (ref 5–15)
AST SERPL-CCNC: 11 U/L (ref 1–40)
BILIRUB BLD-MCNC: NEGATIVE MG/DL
BILIRUB SERPL-MCNC: 0.4 MG/DL (ref 0.2–1.2)
BUN BLD-MCNC: 14 MG/DL (ref 8–23)
BUN/CREAT SERPL: 16.9 (ref 7–25)
CALCIUM SPEC-SCNC: 9 MG/DL (ref 8.6–10.5)
CHLORIDE SERPL-SCNC: 103 MMOL/L (ref 98–107)
CLARITY, POC: CLEAR
CO2 SERPL-SCNC: 24.9 MMOL/L (ref 22–29)
COLOR UR: YELLOW
CREAT BLD-MCNC: 0.83 MG/DL (ref 0.76–1.27)
CREAT UR-MCNC: 65.4 MG/DL
GFR SERPL CREATININE-BSD FRML MDRD: 94 ML/MIN/1.73
GLOBULIN UR ELPH-MCNC: 2.6 GM/DL
GLUCOSE BLD-MCNC: 115 MG/DL (ref 65–99)
GLUCOSE UR STRIP-MCNC: NEGATIVE MG/DL
HBA1C MFR BLD: 5.7 %
KETONES UR QL: NEGATIVE
LEUKOCYTE EST, POC: NEGATIVE
MICROALBUMIN/CREAT UR: NORMAL MG/G{CREAT}
NITRITE UR-MCNC: NEGATIVE MG/ML
NT-PROBNP SERPL-MCNC: 98.5 PG/ML (ref 5–900)
PH UR: 6 [PH] (ref 5–8)
POTASSIUM BLD-SCNC: 4.7 MMOL/L (ref 3.5–5.2)
PROT SERPL-MCNC: 6.8 G/DL (ref 6–8.5)
PROT UR STRIP-MCNC: NEGATIVE MG/DL
RBC # UR STRIP: NEGATIVE /UL
SODIUM BLD-SCNC: 138 MMOL/L (ref 136–145)
SP GR UR: 1.02 (ref 1–1.03)
TSH SERPL DL<=0.05 MIU/L-ACNC: 1.64 UIU/ML (ref 0.27–4.2)
UROBILINOGEN UR QL: NORMAL

## 2020-05-20 PROCEDURE — 82043 UR ALBUMIN QUANTITATIVE: CPT | Performed by: NURSE PRACTITIONER

## 2020-05-20 PROCEDURE — 83036 HEMOGLOBIN GLYCOSYLATED A1C: CPT | Performed by: NURSE PRACTITIONER

## 2020-05-20 PROCEDURE — 83880 ASSAY OF NATRIURETIC PEPTIDE: CPT | Performed by: NURSE PRACTITIONER

## 2020-05-20 PROCEDURE — 99214 OFFICE O/P EST MOD 30 MIN: CPT | Performed by: NURSE PRACTITIONER

## 2020-05-20 PROCEDURE — 82570 ASSAY OF URINE CREATININE: CPT | Performed by: NURSE PRACTITIONER

## 2020-05-20 PROCEDURE — 81003 URINALYSIS AUTO W/O SCOPE: CPT | Performed by: NURSE PRACTITIONER

## 2020-05-20 PROCEDURE — 36415 COLL VENOUS BLD VENIPUNCTURE: CPT | Performed by: NURSE PRACTITIONER

## 2020-05-20 PROCEDURE — 85025 COMPLETE CBC W/AUTO DIFF WBC: CPT | Performed by: NURSE PRACTITIONER

## 2020-05-20 PROCEDURE — 80053 COMPREHEN METABOLIC PANEL: CPT | Performed by: NURSE PRACTITIONER

## 2020-05-20 PROCEDURE — 84443 ASSAY THYROID STIM HORMONE: CPT | Performed by: NURSE PRACTITIONER

## 2020-05-20 NOTE — PROGRESS NOTES
Chief Complaint   Patient presents with   • Establish Care   • Mass     back of neck.    • Hypertension       History of Present Illness  60 y.o.male presents for establish care, mass on neck, and hypertension.  Prior PCP through  family medicine.    Hypertension onset years however had not been taking his blood pressure medication up until recently in March when he developed constant shortness of breath.  Newly diagnosed systolic heart failure with moderately decreased ejection fraction 31 to 35% followed by cardiology Leonela Madrigal NP.  Now taking Coreg, entresto, and spironolactone.  Wearing LifeVest.    HLD history; not on statin.  Nonfasting today.  Reports recent ear infection finishing eardrops.  Bronchitis earlier this year only has periodic use of inhaler.  History of opioid addiction followed in the Suboxone clinic in [place].  Positive alcohol use; about 12 beers a day.  Smokes half pack a day with onset of smoking around age 9  Colonoscopy: he is not for sure when he last had one; was done through  and patient reports there was nothing abnormal.  Immunizations reviewed; patient reports that he has had a tetanus shot in the last 10 years. He is not sure on if he has had shingles vaccine.  Complains of left shoulder left arm pain.  Has been hurting him for over a year.  Feels like a pulled tendon no specific injury that he recalls.  Last few days he noticed when he was rubbing up in his neck he has a mass on his neck.  Not sure how long that it has been there.  Area there is nontender to touch.      Review of Systems   Constitutional: Positive for diaphoresis. Negative for chills, fever, unexpected weight gain and unexpected weight loss.        Night sweats   Eyes: Negative for blurred vision and visual disturbance.   Respiratory: Positive for shortness of breath.    Cardiovascular: Positive for chest pain, palpitations and leg swelling.   Gastrointestinal: Positive for nausea. Negative for  abdominal pain, constipation, diarrhea and vomiting.   Genitourinary: Negative for difficulty urinating.   Musculoskeletal: Positive for arthralgias and back pain.   Skin: Negative for rash.        Lump on neck   Neurological: Negative for weakness, numbness and headache.   Hematological: Does not bruise/bleed easily.   Psychiatric/Behavioral: Negative for sleep disturbance and depressed mood. The patient is not nervous/anxious.          [facility]  The following portions of the patient's history were reviewed and updated as appropriate: allergies, current medications, past family history, past medical history, past social history, past surgical history and problem list.     Past Medical History:   Diagnosis Date   • Acute systolic CHF (congestive heart failure) (CMS/HCC) 5/13/2020    Echo 5/6/20 LVEF = 31.0%. Left ventricular diastolic dysfunction is noted (grade III w/high LAP) consistent with reversible restrictive pattern Left atrial volume is moderately increased.   • Hyperlipidemia    • Hypertension    • Opiate addiction (CMS/HCC)     Resolved since on suboxone      Past Surgical History:   Procedure Laterality Date   • FINGER SURGERY        No Known Allergies   Social History     Socioeconomic History   • Marital status:    Tobacco Use   • Smoking status: Current Every Day Smoker     Packs/day: 0.50   • Smokeless tobacco: Never Used   • Tobacco comment: on and off since he was 6 years old   Substance and Sexual Activity   • Alcohol use: Yes     Drinks per session: 10 or more     Binge frequency: Never     Comment: 5-12 beers per night   • Drug use: Yes     Types: Marijuana     Comment: quit pills  2018 but still smokes marijuana   • Sexual activity: Defer   Social History Narrative    caffeine use: 2 servings of coffee daily     Family History   Problem Relation Age of Onset   • Hypertension Mother    • Hyperlipidemia Mother    • Heart failure Mother    • Heart attack Father 88   • Cardiomyopathy Brother  "36        Patient reports he had SCD that they attributed to agent orange              Current Outpatient Medications:   •  albuterol sulfate  (90 Base) MCG/ACT inhaler, Inhale 2 puffs Every 4 (Four) Hours As Needed for Wheezing or Shortness of Air., Disp: 1 inhaler, Rfl: 0  •  buprenorphine-naloxone (SUBOXONE) 8-2 MG per SL tablet, Place 0.5 tablets under the tongue Daily., Disp: , Rfl:   •  carvedilol (COREG) 3.125 MG tablet, Take 1 tablet by mouth 2 (Two) Times a Day., Disp: 180 tablet, Rfl: 3  •  ofloxacin (FLOXIN) 0.3 % otic solution, Administer 5 drops into the left ear Daily., Disp: , Rfl:   •  sacubitril-valsartan (Entresto) 24-26 MG tablet, Take 1 tablet by mouth 2 (Two) Times a Day., Disp: 60 tablet, Rfl: 3  •  spironolactone (ALDACTONE) 25 MG tablet, Take 1 tablet by mouth Daily., Disp: 30 tablet, Rfl: 3    VITALS:  /86   Pulse 57   Temp 97.2 °F (36.2 °C)   Ht 175.3 cm (69\")   Wt 105 kg (232 lb)   SpO2 93%   BMI 34.26 kg/m²     Physical Exam   Constitutional: He is oriented to person, place, and time. He appears well-developed and well-nourished. No distress.   HENT:   Head: Normocephalic.   Right Ear: Tympanic membrane, external ear and ear canal normal.   Left Ear: Tympanic membrane, external ear and ear canal normal.   Nose: Mucosal edema present.   Mouth/Throat: Oropharynx is clear and moist and mucous membranes are normal.   Eyes: Pupils are equal, round, and reactive to light. Conjunctivae are normal.   Neck: Trachea normal. Neck supple. No spinous process tenderness present. No neck rigidity. No erythema and normal range of motion present. No thyromegaly present.       Cardiovascular: Normal rate, regular rhythm and normal heart sounds.   Wearing lifevest   Pulmonary/Chest: Effort normal and breath sounds normal. No respiratory distress.   Abdominal: Soft. Bowel sounds are normal. There is no tenderness.   Musculoskeletal:        Left shoulder: He exhibits decreased range of " motion and tenderness.   Lymphadenopathy:     He has no cervical adenopathy.   Neurological: He is alert and oriented to person, place, and time.   Skin: Skin is warm. Capillary refill takes less than 2 seconds. No rash noted. He is diaphoretic.   Psychiatric: He has a normal mood and affect.   Vitals reviewed.      LABS  Results for orders placed or performed in visit on 05/20/20   Comprehensive Metabolic Panel   Result Value Ref Range    Glucose 115 (H) 65 - 99 mg/dL    BUN 14 8 - 23 mg/dL    Creatinine 0.83 0.76 - 1.27 mg/dL    Sodium 138 136 - 145 mmol/L    Potassium 4.7 3.5 - 5.2 mmol/L    Chloride 103 98 - 107 mmol/L    CO2 24.9 22.0 - 29.0 mmol/L    Calcium 9.0 8.6 - 10.5 mg/dL    Total Protein 6.8 6.0 - 8.5 g/dL    Albumin 4.20 3.50 - 5.20 g/dL    ALT (SGPT) 12 1 - 41 U/L    AST (SGOT) 11 1 - 40 U/L    Alkaline Phosphatase 45 39 - 117 U/L    Total Bilirubin 0.4 0.2 - 1.2 mg/dL    eGFR Non African Amer 94 >60 mL/min/1.73    Globulin 2.6 gm/dL    A/G Ratio 1.6 g/dL    BUN/Creatinine Ratio 16.9 7.0 - 25.0    Anion Gap 10.1 5.0 - 15.0 mmol/L   TSH Rfx On Abnormal To Free T4   Result Value Ref Range    TSH 1.640 0.270 - 4.200 uIU/mL   Microalbumin / Creatinine Urine Ratio - Urine, Clean Catch   Result Value Ref Range    Microalbumin/Creatinine Ratio      Creatinine, Urine 65.4 mg/dL    Microalbumin, Urine <1.2 mg/dL   CBC Auto Differential   Result Value Ref Range    WBC 6.42 3.40 - 10.80 10*3/mm3    RBC 5.32 4.14 - 5.80 10*6/mm3    Hemoglobin 15.9 13.0 - 17.7 g/dL    Hematocrit 46.8 37.5 - 51.0 %    MCV 88.0 79.0 - 97.0 fL    MCH 29.9 26.6 - 33.0 pg    MCHC 34.0 31.5 - 35.7 g/dL    RDW 13.3 12.3 - 15.4 %    RDW-SD 42.8 37.0 - 54.0 fl    MPV 11.1 6.0 - 12.0 fL    Platelets 208 140 - 450 10*3/mm3    Neutrophil % 67.9 42.7 - 76.0 %    Lymphocyte % 24.8 19.6 - 45.3 %    Monocyte % 6.4 5.0 - 12.0 %    Eosinophil % 0.3 0.3 - 6.2 %    Basophil % 0.3 0.0 - 1.5 %    Immature Grans % 0.3 0.0 - 0.5 %    Neutrophils,  Absolute 4.36 1.70 - 7.00 10*3/mm3    Lymphocytes, Absolute 1.59 0.70 - 3.10 10*3/mm3    Monocytes, Absolute 0.41 0.10 - 0.90 10*3/mm3    Eosinophils, Absolute 0.02 0.00 - 0.40 10*3/mm3    Basophils, Absolute 0.02 0.00 - 0.20 10*3/mm3    Immature Grans, Absolute 0.02 0.00 - 0.05 10*3/mm3    nRBC 0.2 0.0 - 0.2 /100 WBC   POC Urinalysis Dipstick, Automated   Result Value Ref Range    Color Yellow Yellow, Straw, Dark Yellow, Radha    Clarity, UA Clear Clear    Specific Gravity  1.025 1.005 - 1.030    pH, Urine 6.0 5.0 - 8.0    Leukocytes Negative Negative    Nitrite, UA Negative Negative    Protein, POC Negative Negative mg/dL    Glucose, UA Negative Negative, 1000 mg/dL (3+) mg/dL    Ketones, UA Negative Negative    Urobilinogen, UA Normal Normal    Bilirubin Negative Negative    Blood, UA Negative Negative   POC Glycosylated Hemoglobin (Hb A1C)   Result Value Ref Range    Hemoglobin A1C 5.7 %       ASSESSMENT/PLAN  Alejo was seen today for establish care, mass and hypertension.    Diagnoses and all orders for this visit:    Essential hypertension  Comments:  cont meds as directed by cardiology; DASH diet low sodium <1500mg/day.  Orders:  -     Comprehensive Metabolic Panel  -     POC Urinalysis Dipstick, Automated  -     Cancel: MicroAlbumin, Urine, Random - Urine, Clean Catch  -     Microalbumin / Creatinine Urine Ratio - Urine, Clean Catch    Systolic congestive heart failure, unspecified HF chronicity (CMS/[facility])  Comments:  cont meds as directed by cardiology.    Hyperlipidemia, unspecified hyperlipidemia type  Comments:  Needs fasting lipid panel done at cardiology appt.  Orders:  -     Lipid Panel; Future    Lump on neck  -     CBC & Differential  -     US Head Neck Soft Tissue; Future  -     CBC Auto Differential  Explained likely lypoma benign lump; will check ultrasound to confirm.    Chronic left shoulder pain  -     XR Shoulder 2+ View Left; Future  If nothing significant will consider physical therapy and  orthopedic eval.  Diaphoresis  -     TSH Rfx On Abnormal To Free T4    Prediabetes  -     POC Glycosylated Hemoglobin (Hb A1C)  Need heart healthy diet; decreased carb intake and decreased alcohol consumption.  Alcohol abuse  Encouraged alcohol cessation.  History of opioid abuse (CMS/HCC)  Followed thru suboxone clinic.  Tobacco abuse  Encouraged tobacco cessation      I discussed the patients findings and my recommendations with patient.  Patient was encouraged to keep me informed of any acute changes, lack of improvement, or any new concerning symptoms.  Patient voiced understanding of all instructions and denied further questions.      FOLLOW-UP  Return in about 6 months (around 11/20/2020) for Recheck.  Keep FU with cardiology.  Electronically signed by:    GAGAN Kerr  05/20/2020    EMR Dragon/Transcription Disclaimer:  Much of this encounter note is an electronic transcription/translation of spoken language to printed text.  The electronic translation of spoken language may permit erroneous, or at times, nonsensical words or phrases to be inadvertently transcribed.  Although I have reviewed the note for such errors, some may still exist

## 2020-05-21 ENCOUNTER — TELEPHONE (OUTPATIENT)
Dept: CARDIOLOGY | Facility: HOSPITAL | Age: 60
End: 2020-05-21

## 2020-05-21 LAB
BASOPHILS # BLD AUTO: 0.02 10*3/MM3 (ref 0–0.2)
BASOPHILS NFR BLD AUTO: 0.3 % (ref 0–1.5)
DEPRECATED RDW RBC AUTO: 42.8 FL (ref 37–54)
EOSINOPHIL # BLD AUTO: 0.02 10*3/MM3 (ref 0–0.4)
EOSINOPHIL NFR BLD AUTO: 0.3 % (ref 0.3–6.2)
ERYTHROCYTE [DISTWIDTH] IN BLOOD BY AUTOMATED COUNT: 13.3 % (ref 12.3–15.4)
HCT VFR BLD AUTO: 46.8 % (ref 37.5–51)
HGB BLD-MCNC: 15.9 G/DL (ref 13–17.7)
IMM GRANULOCYTES # BLD AUTO: 0.02 10*3/MM3 (ref 0–0.05)
IMM GRANULOCYTES NFR BLD AUTO: 0.3 % (ref 0–0.5)
LYMPHOCYTES # BLD AUTO: 1.59 10*3/MM3 (ref 0.7–3.1)
LYMPHOCYTES NFR BLD AUTO: 24.8 % (ref 19.6–45.3)
MCH RBC QN AUTO: 29.9 PG (ref 26.6–33)
MCHC RBC AUTO-ENTMCNC: 34 G/DL (ref 31.5–35.7)
MCV RBC AUTO: 88 FL (ref 79–97)
MONOCYTES # BLD AUTO: 0.41 10*3/MM3 (ref 0.1–0.9)
MONOCYTES NFR BLD AUTO: 6.4 % (ref 5–12)
NEUTROPHILS # BLD AUTO: 4.36 10*3/MM3 (ref 1.7–7)
NEUTROPHILS NFR BLD AUTO: 67.9 % (ref 42.7–76)
NRBC BLD AUTO-RTO: 0.2 /100 WBC (ref 0–0.2)
PLATELET # BLD AUTO: 208 10*3/MM3 (ref 140–450)
PMV BLD AUTO: 11.1 FL (ref 6–12)
RBC # BLD AUTO: 5.32 10*6/MM3 (ref 4.14–5.8)
WBC NRBC COR # BLD: 6.42 10*3/MM3 (ref 3.4–10.8)

## 2020-05-21 NOTE — TELEPHONE ENCOUNTER
Called patient and let him know that his BMP,BNP was WNL. Will send letter. Advised to return call with any questions or concerns

## 2020-05-26 ENCOUNTER — HOSPITAL ENCOUNTER (OUTPATIENT)
Dept: ULTRASOUND IMAGING | Facility: HOSPITAL | Age: 60
Discharge: HOME | End: 2020-05-26
Admitting: NURSE PRACTITIONER

## 2020-05-26 DIAGNOSIS — R22.1 LUMP ON NECK: ICD-10-CM

## 2020-05-26 PROCEDURE — 76536 US EXAM OF HEAD AND NECK: CPT

## 2020-05-27 NOTE — PROGRESS NOTES
OFFICE VISIT  NOTE  [facility] CARDIOLOGY      Name: Alejo Condon    Date: 2020  MRN:  [id number]  :  1960      REFERRING/PRIMARY PROVIDER:  Kathryn Bragg APRN    Chief Complaint   Patient presents with   • Acute Systolic CHF   • Shortness of Breath       HPI: Alejo Condon is a 60 y.o. male who presents today for new consultation for acute systolic CHF.  Patient associate history of hypertension, previous opioid dependence, alcohol abuse, tobacco abuse.  Was told by chiropractor 20 years ago he had an enlarged heart but never followed up with cardiology.  Presented to ER 3/2020 for productive cough, treated for bronchitis, found to have ejection fraction 31% on echo 2020, diastolic dysfunction, biatrial enlargement, appears to be chronic heart failure.  Start on carvedilol, Entresto, and spironolactone by heart failure clinic.  Denies chest pain.  He push mows several yards, without chest pain or shortness of breath.  NYHA II-3 symptoms.  He is wearing a LifeVest, no arrhythmias or alerts thus far.  Has cut down from 1/5 of alcohol daily to 4-5 beers daily, still smoking 1 pack/day, trying to cut down.    Past Medical History:   Diagnosis Date   • Acute systolic CHF (congestive heart failure) (CMS/HCC) 2020    Echo 20 LVEF = 31.0%. Left ventricular diastolic dysfunction is noted (grade III w/high LAP) consistent with reversible restrictive pattern Left atrial volume is moderately increased.   • Hyperlipidemia    • Hypertension    • Opiate addiction (CMS/HCC)     Resolved since on suboxone       Past Surgical History:   Procedure Laterality Date   • FINGER SURGERY         Social History     Socioeconomic History   • Marital status:      Spouse name: Not on file   • Number of children: Not on file   • Years of education: Not on file   • Highest education level: Not on file   Tobacco Use   • Smoking status: Current Every Day Smoker      Packs/day: 0.50   • Smokeless tobacco: Never Used   • Tobacco comment: on and off since he was 6 years old   Substance and Sexual Activity   • Alcohol use: Yes     Drinks per session: 10 or more     Binge frequency: Never     Comment: 5-12 beers per night   • Drug use: Yes     Types: Marijuana     Comment: quit pills  2018 but still smokes marijuana   • Sexual activity: Defer   Social History Narrative    caffeine use: 2 servings of coffee daily       Family History   Problem Relation Age of Onset   • Hypertension Mother    • Hyperlipidemia Mother    • Heart failure Mother    • Heart attack Father 88   • Cardiomyopathy Brother 36        Patient reports he had SCD that they attributed to agent orange        ROS:   Constitutional no fever,  no weight loss   Skin no rash, no subcutaneous nodules   Otolaryngeal no difficulty swallowing   Cardiovascular See HPI   Pulmonary no cough, no sputum production   Gastrointestinal no constipation, no diarrhea   Genitourinary no dysuria, no hematuria   Hematologic no easy bruisability, no abnormal bleeding   Musculoskeletal no muscle pain   Neurologic no dizziness, no falls         No Known Allergies      Current Outpatient Medications:   •  albuterol sulfate  (90 Base) MCG/ACT inhaler, Inhale 2 puffs Every 4 (Four) Hours As Needed for Wheezing or Shortness of Air., Disp: 1 inhaler, Rfl: 0  •  buprenorphine-naloxone (SUBOXONE) 8-2 MG per SL tablet, Place 0.5 tablets under the tongue Daily., Disp: , Rfl:   •  carvedilol (COREG) 3.125 MG tablet, Take 1 tablet by mouth 2 (Two) Times a Day., Disp: 180 tablet, Rfl: 3  •  spironolactone (ALDACTONE) 25 MG tablet, Take 1 tablet by mouth Daily., Disp: 30 tablet, Rfl: 3  •  sacubitril-valsartan (Entresto) 49-51 MG tablet, Take 1 tablet by mouth 2 (Two) Times a Day., Disp: 180 tablet, Rfl: 3    Vitals:    05/29/20 1303   BP: (!) 162/102   BP Location: Left arm   Patient Position: Sitting   Pulse: 106   Temp: 95.1 °F (35.1 °C)   SpO2:  "95%   Weight: 104 kg (230 lb 3.2 oz)   Height: 175.3 cm (69\")     Body mass index is 33.99 kg/m².    PHYSICAL EXAM:    General Appearance:   · well developed  · well nourished  HENT:   · oropharynx moist  · lips not cyanotic  Neck:  · thyroid not enlarged  · supple  Respiratory:  · no respiratory distress  · normal breath sounds  · no rales  Cardiovascular:  · no jugular venous distention  · regular rhythm  · apical impulse normal  · S1 normal, S2 normal  · no S3, no S4   · no murmur  · no rub, no thrill  · carotid pulses normal; no bruit  · pedal pulses normal  · lower extremity edema: none    Gastrointestinal:   · bowel sounds normal  · non-tender  · no hepatomegaly, no splenomegaly  Musculoskeletal:  · no clubbing of fingers.   · normocephalic, head atraumatic  Skin:   · warm, dry  Psychiatric:  · judgement and insight appropriate  · normal mood and affect    RESULTS:   Procedures    Results for orders placed during the hospital encounter of 05/06/20   Adult Transthoracic Echo Complete W/ Cont if Necessary Per Protocol    Narrative · Left ventricular systolic function is moderately decreased. Calculated   EF = 31.0%. Estimated EF appears to be in the range of 31 - 35%.  · There is left ventricular global hypokinesis noted. The left ventricular   cavity is borderline dilated.  · Left ventricular diastolic dysfunction is noted (grade III w/high LAP)   consistent with reversible restrictive pattern  · Left atrial volume is moderately increased.  · Mild tricuspid valve regurgitation is present. Estimated right   ventricular systolic pressure from tricuspid regurgitation is normal (<35   mmHg).            Labs:  Lab Results   Component Value Date    AST 11 05/20/2020    ALT 12 05/20/2020     Lab Results   Component Value Date    HGBA1C 5.7 05/20/2020     No components found for: CREATINININE  eGFR Non  Amer   Date Value Ref Range Status   05/20/2020 94 >60 mL/min/1.73 Final   03/20/2020 109 >60 mL/min/1.73 " Final         ASSESSMENT:  Problem List Items Addressed This Visit        Cardiovascular and Mediastinum    Hypertension    Hyperlipidemia LDL goal <100    Acute systolic CHF (congestive heart failure) (CMS/[facility]) - Primary    Overview     Echo 5/6/20  · LVEF = 31.0%.  · Left ventricular diastolic dysfunction is noted (grade III w/high LAP) consistent with reversible restrictive pattern  · Left atrial volume is moderately increased.         Relevant Medications    sacubitril-valsartan (Entresto) 49-51 MG tablet    Other Relevant Orders    Case Request Cath Lab: Left Heart Cath       Other    Tobacco abuse        LifeVest interrogation:  Date: 05/29/20  Average daily wear time:  23.84 Hours  Total patient use percent: 99%  Average daily heart rate: 74  Average daily steps: 6446  Average percent upright: 61, Reclined: 20, Flat: 18  Arrhythmia: 0  Alerts: 1        PLAN:    1.  Acute on chronic systolic heart failure, EF 31%:  Left heart catheterization to rule out ischemic heart disease as etiology  If negative, presumptive etiology will be alcohol and hypertension related    Fairly euvolemic on exam today  Continue spironolactone 25 mg daily  Continue carvedilol  Increase Entresto to 49/51 mg twice daily  Continue LifeVest, device interrogated today, normal function, no arrhythmia  Repeat limited echo with contrast 8/2020, to determine need for ICD    Discussed importance of decreasing alcohol intake ultimately to 0  Discussed importance of complete tobacco cessation.    2.  Uncontrolled hypertension:  Increase Entresto  Continue carvedilol and spironolactone increase as tolerated for blood pressure goal of less than 130/80    3.  Alcohol and tobacco abuse, I discussed the importance of complete cessation of both.  He will continue trying.    Return to clinic in 2 months, or sooner as needed.    Thank you for the opportunity to share in the care of your patient; please do not hesitate to call me with any questions.      Garrett Trejo MD, [facility]  Office: [phone]  [facility] [address] [place] [address]

## 2020-05-29 ENCOUNTER — CONSULT (OUTPATIENT)
Dept: CARDIOLOGY | Facility: CLINIC | Age: 60
End: 2020-05-29

## 2020-05-29 VITALS
WEIGHT: 230.2 LBS | HEART RATE: 106 BPM | TEMPERATURE: 95.1 F | DIASTOLIC BLOOD PRESSURE: 102 MMHG | BODY MASS INDEX: 34.1 KG/M2 | OXYGEN SATURATION: 95 % | HEIGHT: 69 IN | SYSTOLIC BLOOD PRESSURE: 162 MMHG

## 2020-05-29 DIAGNOSIS — E78.5 HYPERLIPIDEMIA LDL GOAL <100: ICD-10-CM

## 2020-05-29 DIAGNOSIS — I50.21 ACUTE SYSTOLIC CHF (CONGESTIVE HEART FAILURE) (HCC): Primary | ICD-10-CM

## 2020-05-29 DIAGNOSIS — Z72.0 TOBACCO ABUSE: ICD-10-CM

## 2020-05-29 DIAGNOSIS — I10 ESSENTIAL HYPERTENSION: ICD-10-CM

## 2020-05-29 PROBLEM — F10.10 ALCOHOL ABUSE: Status: ACTIVE | Noted: 2020-05-29

## 2020-05-29 PROCEDURE — 99204 OFFICE O/P NEW MOD 45 MIN: CPT | Performed by: INTERNAL MEDICINE

## 2020-05-29 PROCEDURE — 93292 WCD DEVICE INTERROGATE: CPT | Performed by: INTERNAL MEDICINE

## 2020-06-01 ENCOUNTER — HOSPITAL ENCOUNTER (OUTPATIENT)
Facility: HOSPITAL | Age: 60
Setting detail: HOSPITAL OUTPATIENT SURGERY
End: 2020-06-01
Attending: INTERNAL MEDICINE | Admitting: INTERNAL MEDICINE

## 2020-06-01 DIAGNOSIS — I50.21 ACUTE SYSTOLIC CHF (CONGESTIVE HEART FAILURE) (HCC): ICD-10-CM

## 2020-06-09 ENCOUNTER — PREP FOR SURGERY (OUTPATIENT)
Dept: OTHER | Facility: HOSPITAL | Age: 60
End: 2020-06-09

## 2020-06-09 DIAGNOSIS — I50.21 ACUTE SYSTOLIC CHF (CONGESTIVE HEART FAILURE) (HCC): Primary | ICD-10-CM

## 2020-06-09 RX ORDER — ASPIRIN 325 MG
325 TABLET ORAL ONCE
Status: CANCELLED | OUTPATIENT
Start: 2020-06-09 | End: 2020-06-09

## 2020-06-09 RX ORDER — NITROGLYCERIN 0.4 MG/1
0.4 TABLET SUBLINGUAL
Status: CANCELLED | OUTPATIENT
Start: 2020-06-09

## 2020-06-09 RX ORDER — SODIUM CHLORIDE 0.9 % (FLUSH) 0.9 %
10 SYRINGE (ML) INJECTION AS NEEDED
Status: CANCELLED | OUTPATIENT
Start: 2020-06-09

## 2020-06-09 RX ORDER — SODIUM CHLORIDE 0.9 % (FLUSH) 0.9 %
3 SYRINGE (ML) INJECTION EVERY 12 HOURS SCHEDULED
Status: CANCELLED | OUTPATIENT
Start: 2020-06-09

## 2020-06-09 RX ORDER — ONDANSETRON 2 MG/ML
4 INJECTION INTRAMUSCULAR; INTRAVENOUS EVERY 8 HOURS PRN
Status: CANCELLED | OUTPATIENT
Start: 2020-06-09

## 2020-06-09 RX ORDER — ASPIRIN 325 MG
325 TABLET, DELAYED RELEASE (ENTERIC COATED) ORAL DAILY
Status: CANCELLED | OUTPATIENT
Start: 2020-06-10

## 2020-06-10 ENCOUNTER — TELEPHONE (OUTPATIENT)
Dept: CARDIOLOGY | Facility: CLINIC | Age: 60
End: 2020-06-10

## 2020-06-10 NOTE — TELEPHONE ENCOUNTER
PA for Entresto approved    Alejo Condon (Key: [id number])     Rx #: [id number]     Entresto 24-26MG tablets

## 2020-06-16 ENCOUNTER — DOCUMENTATION (OUTPATIENT)
Dept: CARDIAC REHAB | Facility: HOSPITAL | Age: 60
End: 2020-06-16

## 2020-08-12 ENCOUNTER — HOSPITAL ENCOUNTER (OUTPATIENT)
Facility: HOSPITAL | Age: 60
Setting detail: HOSPITAL OUTPATIENT SURGERY
End: 2020-08-12
Attending: INTERNAL MEDICINE | Admitting: INTERNAL MEDICINE

## 2020-08-12 DIAGNOSIS — I50.21 ACUTE SYSTOLIC CHF (CONGESTIVE HEART FAILURE) (HCC): ICD-10-CM

## 2020-08-16 ENCOUNTER — APPOINTMENT (OUTPATIENT)
Dept: PREADMISSION TESTING | Facility: HOSPITAL | Age: 60
End: 2020-08-16

## 2020-08-16 PROCEDURE — U0002 COVID-19 LAB TEST NON-CDC: HCPCS

## 2020-08-16 PROCEDURE — C9803 HOPD COVID-19 SPEC COLLECT: HCPCS

## 2020-08-16 PROCEDURE — U0004 COV-19 TEST NON-CDC HGH THRU: HCPCS

## 2020-08-17 ENCOUNTER — PREP FOR SURGERY (OUTPATIENT)
Dept: OTHER | Facility: HOSPITAL | Age: 60
End: 2020-08-17

## 2020-08-17 LAB
REF LAB TEST METHOD: NORMAL
SARS-COV-2 RNA RESP QL NAA+PROBE: NOT DETECTED

## 2020-08-19 DIAGNOSIS — I50.21 ACUTE SYSTOLIC CHF (CONGESTIVE HEART FAILURE) (HCC): Primary | ICD-10-CM

## 2020-08-19 NOTE — PROGRESS NOTES
I spoke with the pt this morning about his heart cath. He says that he thought it was Friday. He got his COVID test on 8/17. He expresses concern about transportation as well as pricing for Entresto and his LifeVest. He hasn't been taking his Entresto because of price. He never called to tell anyone it was too expensive. I advised his PA was approved on June 10th but he never checked with pharmacy about cost after PA was approved. He is going to call the pharmacy and see how much it cost. If it is still too expensive I advised I left samples at the  in [place] office for him to . We talked in depth about why [facility] wants him to have his heart cath and why it is so important regarding his heart failure. He understands and he is agreeable to having heart cath done. Advised I would put another order in and we would call to have it scheduled. I did advise him he would have to have another COVID test done prior to this heart cath. Pt has no further questions at this time.

## 2020-08-20 ENCOUNTER — PREP FOR SURGERY (OUTPATIENT)
Dept: OTHER | Facility: HOSPITAL | Age: 60
End: 2020-08-20

## 2020-08-20 DIAGNOSIS — I50.21 ACUTE SYSTOLIC CHF (CONGESTIVE HEART FAILURE) (HCC): Primary | ICD-10-CM

## 2020-08-20 RX ORDER — ASPIRIN 325 MG
325 TABLET ORAL ONCE
Status: CANCELLED | OUTPATIENT
Start: 2020-08-20 | End: 2020-08-20

## 2020-08-20 RX ORDER — ONDANSETRON 2 MG/ML
4 INJECTION INTRAMUSCULAR; INTRAVENOUS EVERY 8 HOURS PRN
Status: CANCELLED | OUTPATIENT
Start: 2020-08-20

## 2020-08-20 RX ORDER — SODIUM CHLORIDE 0.9 % (FLUSH) 0.9 %
3 SYRINGE (ML) INJECTION EVERY 12 HOURS SCHEDULED
Status: CANCELLED | OUTPATIENT
Start: 2020-08-20

## 2020-08-20 RX ORDER — SODIUM CHLORIDE 0.9 % (FLUSH) 0.9 %
10 SYRINGE (ML) INJECTION AS NEEDED
Status: CANCELLED | OUTPATIENT
Start: 2020-08-20

## 2020-08-20 RX ORDER — ASPIRIN 325 MG
325 TABLET, DELAYED RELEASE (ENTERIC COATED) ORAL DAILY
Status: CANCELLED | OUTPATIENT
Start: 2020-08-21

## 2020-08-20 RX ORDER — NITROGLYCERIN 0.4 MG/1
0.4 TABLET SUBLINGUAL
Status: CANCELLED | OUTPATIENT
Start: 2020-08-20

## 2020-08-23 ENCOUNTER — APPOINTMENT (OUTPATIENT)
Dept: PREADMISSION TESTING | Facility: HOSPITAL | Age: 60
End: 2020-08-23

## 2020-08-23 PROCEDURE — C9803 HOPD COVID-19 SPEC COLLECT: HCPCS

## 2020-08-23 PROCEDURE — U0004 COV-19 TEST NON-CDC HGH THRU: HCPCS

## 2020-08-23 PROCEDURE — U0002 COVID-19 LAB TEST NON-CDC: HCPCS

## 2020-08-24 LAB
REF LAB TEST METHOD: NORMAL
SARS-COV-2 RNA RESP QL NAA+PROBE: NOT DETECTED

## 2020-08-25 ENCOUNTER — HOSPITAL ENCOUNTER (OUTPATIENT)
Facility: HOSPITAL | Age: 60
Discharge: HOME | End: 2020-08-25
Attending: INTERNAL MEDICINE | Admitting: INTERNAL MEDICINE

## 2020-08-25 VITALS
OXYGEN SATURATION: 93 % | BODY MASS INDEX: 31.72 KG/M2 | SYSTOLIC BLOOD PRESSURE: 129 MMHG | HEART RATE: 66 BPM | DIASTOLIC BLOOD PRESSURE: 80 MMHG | WEIGHT: 209.3 LBS | TEMPERATURE: 97.2 F | HEIGHT: 68 IN | RESPIRATION RATE: 18 BRPM

## 2020-08-25 DIAGNOSIS — I50.21 ACUTE SYSTOLIC CHF (CONGESTIVE HEART FAILURE) (HCC): ICD-10-CM

## 2020-08-25 LAB
ANION GAP SERPL CALCULATED.3IONS-SCNC: 11 MMOL/L (ref 5–15)
BUN SERPL-MCNC: 12 MG/DL (ref 8–23)
BUN/CREAT SERPL: 14.3 (ref 7–25)
CALCIUM SPEC-SCNC: 8.9 MG/DL (ref 8.6–10.5)
CHLORIDE SERPL-SCNC: 102 MMOL/L (ref 98–107)
CHOLEST SERPL-MCNC: 162 MG/DL (ref 0–200)
CO2 SERPL-SCNC: 26 MMOL/L (ref 22–29)
CREAT BLDA-MCNC: 0.8 MG/DL (ref 0.6–1.3)
CREAT SERPL-MCNC: 0.84 MG/DL (ref 0.76–1.27)
DEPRECATED RDW RBC AUTO: 42.7 FL (ref 37–54)
ERYTHROCYTE [DISTWIDTH] IN BLOOD BY AUTOMATED COUNT: 12.9 % (ref 12.3–15.4)
GFR SERPL CREATININE-BSD FRML MDRD: 93 ML/MIN/1.73
GLUCOSE SERPL-MCNC: 102 MG/DL (ref 65–99)
HBA1C MFR BLD: 5.6 % (ref 4.8–5.6)
HCT VFR BLD AUTO: 46.7 % (ref 37.5–51)
HDLC SERPL-MCNC: 74 MG/DL (ref 40–60)
HGB BLD-MCNC: 16 G/DL (ref 13–17.7)
LDLC SERPL CALC-MCNC: 73 MG/DL (ref 0–100)
LDLC/HDLC SERPL: 0.99 {RATIO}
MCH RBC QN AUTO: 30.9 PG (ref 26.6–33)
MCHC RBC AUTO-ENTMCNC: 34.3 G/DL (ref 31.5–35.7)
MCV RBC AUTO: 90.3 FL (ref 79–97)
PLATELET # BLD AUTO: 220 10*3/MM3 (ref 140–450)
PMV BLD AUTO: 10.3 FL (ref 6–12)
POTASSIUM SERPL-SCNC: 3.9 MMOL/L (ref 3.5–5.2)
RBC # BLD AUTO: 5.17 10*6/MM3 (ref 4.14–5.8)
SODIUM SERPL-SCNC: 139 MMOL/L (ref 136–145)
TRIGL SERPL-MCNC: 73 MG/DL (ref 0–150)
VLDLC SERPL-MCNC: 14.6 MG/DL
WBC # BLD AUTO: 6.9 10*3/MM3 (ref 3.4–10.8)

## 2020-08-25 PROCEDURE — 25010000002 FENTANYL CITRATE (PF) 100 MCG/2ML SOLUTION: Performed by: INTERNAL MEDICINE

## 2020-08-25 PROCEDURE — 25010000002 HEPARIN (PORCINE) PER 1000 UNITS: Performed by: INTERNAL MEDICINE

## 2020-08-25 PROCEDURE — 83036 HEMOGLOBIN GLYCOSYLATED A1C: CPT | Performed by: PHYSICIAN ASSISTANT

## 2020-08-25 PROCEDURE — 80048 BASIC METABOLIC PNL TOTAL CA: CPT | Performed by: INTERNAL MEDICINE

## 2020-08-25 PROCEDURE — 93458 L HRT ARTERY/VENTRICLE ANGIO: CPT | Performed by: INTERNAL MEDICINE

## 2020-08-25 PROCEDURE — 25010000002 DIPHENHYDRAMINE PER 50 MG: Performed by: INTERNAL MEDICINE

## 2020-08-25 PROCEDURE — 85027 COMPLETE CBC AUTOMATED: CPT

## 2020-08-25 PROCEDURE — C1769 GUIDE WIRE: HCPCS | Performed by: INTERNAL MEDICINE

## 2020-08-25 PROCEDURE — 80061 LIPID PANEL: CPT | Performed by: PHYSICIAN ASSISTANT

## 2020-08-25 PROCEDURE — 25010000002 MIDAZOLAM PER 1 MG: Performed by: INTERNAL MEDICINE

## 2020-08-25 PROCEDURE — C1894 INTRO/SHEATH, NON-LASER: HCPCS | Performed by: INTERNAL MEDICINE

## 2020-08-25 PROCEDURE — 82565 ASSAY OF CREATININE: CPT

## 2020-08-25 PROCEDURE — 0 IOPAMIDOL PER 1 ML: Performed by: INTERNAL MEDICINE

## 2020-08-25 PROCEDURE — 25010000003 LIDOCAINE 1 % SOLUTION: Performed by: INTERNAL MEDICINE

## 2020-08-25 RX ORDER — NITROGLYCERIN 0.4 MG/1
0.4 TABLET SUBLINGUAL
Status: DISCONTINUED | OUTPATIENT
Start: 2020-08-25 | End: 2020-08-25 | Stop reason: HOSPADM

## 2020-08-25 RX ORDER — SODIUM CHLORIDE 0.9 % (FLUSH) 0.9 %
10 SYRINGE (ML) INJECTION AS NEEDED
Status: DISCONTINUED | OUTPATIENT
Start: 2020-08-25 | End: 2020-08-25 | Stop reason: HOSPADM

## 2020-08-25 RX ORDER — SODIUM CHLORIDE 0.9 % (FLUSH) 0.9 %
3 SYRINGE (ML) INJECTION EVERY 12 HOURS SCHEDULED
Status: DISCONTINUED | OUTPATIENT
Start: 2020-08-25 | End: 2020-08-25 | Stop reason: HOSPADM

## 2020-08-25 RX ORDER — MIDAZOLAM HYDROCHLORIDE 1 MG/ML
INJECTION INTRAMUSCULAR; INTRAVENOUS AS NEEDED
Status: DISCONTINUED | OUTPATIENT
Start: 2020-08-25 | End: 2020-08-25 | Stop reason: HOSPADM

## 2020-08-25 RX ORDER — ONDANSETRON 2 MG/ML
4 INJECTION INTRAMUSCULAR; INTRAVENOUS EVERY 8 HOURS PRN
Status: DISCONTINUED | OUTPATIENT
Start: 2020-08-25 | End: 2020-08-25 | Stop reason: HOSPADM

## 2020-08-25 RX ORDER — DIPHENHYDRAMINE HYDROCHLORIDE 50 MG/ML
INJECTION INTRAMUSCULAR; INTRAVENOUS AS NEEDED
Status: DISCONTINUED | OUTPATIENT
Start: 2020-08-25 | End: 2020-08-25 | Stop reason: HOSPADM

## 2020-08-25 RX ORDER — LIDOCAINE HYDROCHLORIDE 10 MG/ML
INJECTION, SOLUTION INFILTRATION; PERINEURAL AS NEEDED
Status: DISCONTINUED | OUTPATIENT
Start: 2020-08-25 | End: 2020-08-25 | Stop reason: HOSPADM

## 2020-08-25 RX ORDER — FENTANYL CITRATE 50 UG/ML
INJECTION, SOLUTION INTRAMUSCULAR; INTRAVENOUS AS NEEDED
Status: DISCONTINUED | OUTPATIENT
Start: 2020-08-25 | End: 2020-08-25 | Stop reason: HOSPADM

## 2020-08-25 RX ORDER — ASPIRIN 325 MG
325 TABLET, DELAYED RELEASE (ENTERIC COATED) ORAL DAILY
Status: DISCONTINUED | OUTPATIENT
Start: 2020-08-26 | End: 2020-08-25 | Stop reason: HOSPADM

## 2020-08-25 RX ORDER — CARVEDILOL 6.25 MG/1
6.25 TABLET ORAL 2 TIMES DAILY WITH MEALS
Qty: 60 TABLET | Refills: 11 | OUTPATIENT
Start: 2020-08-25 | End: 2020-12-26

## 2020-08-25 RX ORDER — SODIUM CHLORIDE 9 MG/ML
3 INJECTION, SOLUTION INTRAVENOUS CONTINUOUS
Status: ACTIVE | OUTPATIENT
Start: 2020-08-25 | End: 2020-08-25

## 2020-08-25 RX ORDER — ASPIRIN 325 MG
325 TABLET ORAL ONCE
Status: COMPLETED | OUTPATIENT
Start: 2020-08-25 | End: 2020-08-25

## 2020-08-25 RX ADMIN — SODIUM CHLORIDE 3 ML/KG/HR: 9 INJECTION, SOLUTION INTRAVENOUS at 09:27

## 2020-08-25 RX ADMIN — ASPIRIN 325 MG ORAL TABLET 325 MG: 325 PILL ORAL at 09:25

## 2020-08-25 NOTE — H&P
[facility]  H&P    Patient: Alejo Condon  1960    PCP:  Kathryn Bragg APRN   Treatment Team:   Attending Provider: Garrett Trejo MD  Admitting Provider: Garrett Trejo MD   8/25/2020      DATE OF SERVICE: 8/25/2020 09:05     IDENTIFICATION: A 60 y.o. male      CHIEF COMPLAINT: acute systolic chf    PROBLEM LIST:    Acute systolic CHF (congestive heart failure) (CMS/HCC)        Past Medical History:   Diagnosis Date   • Acute systolic CHF (congestive heart failure) (CMS/HCC) 5/13/2020    Echo 5/6/20 LVEF = 31.0%. Left ventricular diastolic dysfunction is noted (grade III w/high LAP) consistent with reversible restrictive pattern Left atrial volume is moderately increased.   • Hyperlipidemia    • Hypertension    • Opiate addiction (CMS/HCC)     Resolved since on suboxone     Past Surgical History:   Procedure Laterality Date   • FINGER SURGERY         Allergies  No Known Allergies    Current Medications    Current Facility-Administered Medications:   •  aspirin tablet 325 mg, 325 mg, Oral, Once **AND** [START ON 8/26/2020] aspirin EC tablet 325 mg, 325 mg, Oral, Daily, Bert Puentes, PA  •  nitroglycerin (NITROSTAT) SL tablet 0.4 mg, 0.4 mg, Sublingual, Q5 Min PRN, Bert Puentes, PA  •  ondansetron (ZOFRAN) injection 4 mg, 4 mg, Intravenous, Q8H PRN, Bert Puentes, PA  •  sodium chloride 0.9 % flush 10 mL, 10 mL, Intravenous, PRN, Bert Puentes, PA  •  sodium chloride 0.9 % flush 3 mL, 3 mL, Intravenous, Q12H, Bert Puentes, PA       History of Present Illness   This is a 60-year-old male with a PMH significant for HTN, alcohol abuse, tobacco abuse, and previous opioid abuse now on Suboxone who has been undergoing work-up for new onset acute systolic CHF.  He was evaluated in the [facility] ED in March for bronchitis and was found to have an EF of 31% on echo.  He then established with the heart failure clinic and was started on carvedilol, Entresto,  "and spironolactone.  He was evaluated by Dr. Trejo in clinic on 5/29/2020 at which time his Entresto was increased and it was recommended for him to undergo LHC for definitive assessment of coronaries to rule in/out ischemic disease as etiology for heart failure.  He presents to [facility] 8/25/2024 LHC plus or minus CBI.  He denies any recent CP, dyspnea, orthopnea, LE edema, palpitations. He is smoking 1 cigarette every few days \"for anxiety\". Still drinking several beers per day. Is off suboxone. Gets exercise doing yard work without significant limitations.      ROS  Review of Systems   Constitution: Positive for malaise/fatigue.   Psychiatric/Behavioral: Positive for substance abuse. The patient is nervous/anxious.    All other systems reviewed and are negative.      SOCIAL HX  Social History     Socioeconomic History   • Marital status:      Spouse name: Not on file   • Number of children: Not on file   • Years of education: Not on file   • Highest education level: Not on file   Tobacco Use   • Smoking status: Current Every Day Smoker     Packs/day: 0.50   • Smokeless tobacco: Never Used   • Tobacco comment: on and off since he was 6 years old   Substance and Sexual Activity   • Alcohol use: Yes     Drinks per session: 10 or more     Binge frequency: Never     Comment: 5-12 beers per night   • Drug use: Yes     Types: Marijuana     Comment: quit pills  2018 but still smokes marijuana   • Sexual activity: Defer   Social History Narrative    caffeine use: 2 servings of coffee daily       FAMILY HX  Family History   Problem Relation Age of Onset   • Hypertension Mother    • Hyperlipidemia Mother    • Heart failure Mother    • Heart attack Father 88   • Cardiomyopathy Brother 36        Patient reports he had SCD that they attributed to agent orange       OBJECTIVE:  There were no vitals filed for this visit.  No intake/output data recorded.  No intake/output data recorded.  Intake & Output (last 3 days)  "    None           PHYSICAL EXAMINATION:  Physical Exam   Constitutional: He is oriented to person, place, and time. He appears well-developed and well-nourished. No distress.   HENT:   Head: Normocephalic and atraumatic.   Right Ear: External ear normal.   Left Ear: External ear normal.   Nose: Nose normal.   Eyes: Conjunctivae and EOM are normal.   Neck: Neck supple. No hepatojugular reflux and no JVD present. Carotid bruit is not present. No thyromegaly present.   Cardiovascular: Normal rate, regular rhythm, S1 normal, S2 normal, normal heart sounds, intact distal pulses and normal pulses. Exam reveals no gallop, no distant heart sounds and no midsystolic click.   No murmur heard.  Pulses:       Radial pulses are 2+ on the right side, and 2+ on the left side.        Dorsalis pedis pulses are 2+ on the right side, and 2+ on the left side.        Posterior tibial pulses are 2+ on the right side, and 2+ on the left side.   Pulmonary/Chest: Effort normal and breath sounds normal. No respiratory distress. He has no decreased breath sounds. He has no wheezes. He has no rhonchi. He has no rales.   Abdominal: Soft. Bowel sounds are normal. There is no hepatosplenomegaly. There is no tenderness.   Musculoskeletal: Normal range of motion. He exhibits no edema.   bilat barbeau tests acceptable    Neurological: He is alert and oriented to person, place, and time.   No focal deficits.   Skin: Skin is warm and dry. No erythema.   Psychiatric: He has a normal mood and affect. Thought content normal.   Nursing note and vitals reviewed.      Diagnostic Data:  Lab Results (last 24 hours)     ** No results found for the last 24 hours. **        ECG/EMG Results (last 24 hours)     ** No results found for the last 24 hours. **               Acute systolic CHF (congestive heart failure) (CMS/[facility])        ASSESSMENT/PLAN:  Acute Systolic CHF  - new onset EF 31% on echo 5/6/2020  - continue coreg, entresto, spironolactone  - will proceed  w [facility] +/- CBI today to rule out ischemic disease as etiology. If negative, presumptive etiology for cardiomyopathy would be EtOH and HTN. Risks, benefits, and alternatives were discussed with the patient and he is agreeable to proceed.     HTN  - conitnue coreg, entresto, spironolactone  - goal /80    EtOH abuse  - previously drank a fifth daily, now down to several beers per day. Counseled on cessation.    Tobacco Abuse  - counseled on need for complete cessation      Electronically signed by Dorys Garcia PA-C, 08/25/20, 8:41 AM.     The risks, benefits, and alternative options of cardiac catheterization were discussed with the patient.  The risks include death, MI, stroke, infection, vascular injury requiring surgical repair and/or blood transfusion, coronary dissection, renal dysfunction/failure, allergic reaction, emergent CABG.  If PCI is needed there is a 1-2% risk of emergent CABG.  The patient is agreeable for cardiac catheterization, possible PCI or CABG. Plan is to proceed with cardiac catheterization and possible PCI.     Garrett Trejo M.D., F.A.C.C.  Interventional Cardiology  08/25/20  13:35

## 2020-12-26 ENCOUNTER — APPOINTMENT (OUTPATIENT)
Dept: CT IMAGING | Facility: HOSPITAL | Age: 60
End: 2020-12-26

## 2020-12-26 ENCOUNTER — HOSPITAL ENCOUNTER (EMERGENCY)
Facility: HOSPITAL | Age: 60
Discharge: HOME | End: 2020-12-26
Attending: EMERGENCY MEDICINE | Admitting: EMERGENCY MEDICINE

## 2020-12-26 VITALS
RESPIRATION RATE: 19 BRPM | OXYGEN SATURATION: 99 % | TEMPERATURE: 98 F | HEART RATE: 95 BPM | HEIGHT: 69 IN | WEIGHT: 210 LBS | DIASTOLIC BLOOD PRESSURE: 83 MMHG | BODY MASS INDEX: 31.1 KG/M2 | SYSTOLIC BLOOD PRESSURE: 126 MMHG

## 2020-12-26 DIAGNOSIS — R44.3 HALLUCINATIONS, UNSPECIFIED: Primary | ICD-10-CM

## 2020-12-26 DIAGNOSIS — F22 PARANOIA (HCC): ICD-10-CM

## 2020-12-26 LAB
ALBUMIN SERPL-MCNC: 3.7 G/DL (ref 3.5–5.2)
ALBUMIN/GLOB SERPL: 1.4 G/DL
ALP SERPL-CCNC: 59 U/L (ref 39–117)
ALT SERPL W P-5'-P-CCNC: 19 U/L (ref 1–41)
AMMONIA BLD-SCNC: 15 UMOL/L (ref 16–60)
AMPHET+METHAMPHET UR QL: POSITIVE
AMPHETAMINES UR QL: POSITIVE
ANION GAP SERPL CALCULATED.3IONS-SCNC: 7 MMOL/L (ref 5–15)
APAP SERPL-MCNC: <5 MCG/ML (ref 0–30)
AST SERPL-CCNC: 17 U/L (ref 1–40)
BARBITURATES UR QL SCN: NEGATIVE
BASOPHILS # BLD AUTO: 0.04 10*3/MM3 (ref 0–0.2)
BASOPHILS NFR BLD AUTO: 0.5 % (ref 0–1.5)
BENZODIAZ UR QL SCN: NEGATIVE
BILIRUB SERPL-MCNC: 0.3 MG/DL (ref 0–1.2)
BILIRUB UR QL STRIP: NEGATIVE
BUN SERPL-MCNC: 12 MG/DL (ref 8–23)
BUN/CREAT SERPL: 14.8 (ref 7–25)
BUPRENORPHINE SERPL-MCNC: NEGATIVE NG/ML
CALCIUM SPEC-SCNC: 8.6 MG/DL (ref 8.6–10.5)
CANNABINOIDS SERPL QL: NEGATIVE
CHLORIDE SERPL-SCNC: 103 MMOL/L (ref 98–107)
CLARITY UR: CLEAR
CO2 SERPL-SCNC: 30 MMOL/L (ref 22–29)
COCAINE UR QL: NEGATIVE
COLOR UR: YELLOW
CREAT SERPL-MCNC: 0.81 MG/DL (ref 0.76–1.27)
DEPRECATED RDW RBC AUTO: 41.2 FL (ref 37–54)
EOSINOPHIL # BLD AUTO: 0.04 10*3/MM3 (ref 0–0.4)
EOSINOPHIL NFR BLD AUTO: 0.5 % (ref 0.3–6.2)
ERYTHROCYTE [DISTWIDTH] IN BLOOD BY AUTOMATED COUNT: 12.4 % (ref 12.3–15.4)
ETHANOL BLD-MCNC: <10 MG/DL (ref 0–10)
GFR SERPL CREATININE-BSD FRML MDRD: 97 ML/MIN/1.73
GLOBULIN UR ELPH-MCNC: 2.7 GM/DL
GLUCOSE SERPL-MCNC: 106 MG/DL (ref 65–99)
GLUCOSE UR STRIP-MCNC: NEGATIVE MG/DL
HCT VFR BLD AUTO: 46.5 % (ref 37.5–51)
HGB BLD-MCNC: 15.3 G/DL (ref 13–17.7)
HGB UR QL STRIP.AUTO: NEGATIVE
IMM GRANULOCYTES # BLD AUTO: 0.02 10*3/MM3 (ref 0–0.05)
IMM GRANULOCYTES NFR BLD AUTO: 0.2 % (ref 0–0.5)
KETONES UR QL STRIP: NEGATIVE
LEUKOCYTE ESTERASE UR QL STRIP.AUTO: NEGATIVE
LYMPHOCYTES # BLD AUTO: 1.44 10*3/MM3 (ref 0.7–3.1)
LYMPHOCYTES NFR BLD AUTO: 16.9 % (ref 19.6–45.3)
MCH RBC QN AUTO: 29.7 PG (ref 26.6–33)
MCHC RBC AUTO-ENTMCNC: 32.9 G/DL (ref 31.5–35.7)
MCV RBC AUTO: 90.3 FL (ref 79–97)
METHADONE UR QL SCN: NEGATIVE
MONOCYTES # BLD AUTO: 0.56 10*3/MM3 (ref 0.1–0.9)
MONOCYTES NFR BLD AUTO: 6.6 % (ref 5–12)
NEUTROPHILS NFR BLD AUTO: 6.41 10*3/MM3 (ref 1.7–7)
NEUTROPHILS NFR BLD AUTO: 75.3 % (ref 42.7–76)
NITRITE UR QL STRIP: NEGATIVE
NRBC BLD AUTO-RTO: 0 /100 WBC (ref 0–0.2)
OPIATES UR QL: NEGATIVE
OXYCODONE UR QL SCN: NEGATIVE
PCP UR QL SCN: NEGATIVE
PH UR STRIP.AUTO: 6.5 [PH] (ref 5–8)
PLATELET # BLD AUTO: 242 10*3/MM3 (ref 140–450)
PMV BLD AUTO: 10 FL (ref 6–12)
POTASSIUM SERPL-SCNC: 4.2 MMOL/L (ref 3.5–5.2)
PROPOXYPH UR QL: NEGATIVE
PROT SERPL-MCNC: 6.4 G/DL (ref 6–8.5)
PROT UR QL STRIP: NEGATIVE
RBC # BLD AUTO: 5.15 10*6/MM3 (ref 4.14–5.8)
SALICYLATES SERPL-MCNC: 0.3 MG/DL
SODIUM SERPL-SCNC: 140 MMOL/L (ref 136–145)
SP GR UR STRIP: 1.01 (ref 1–1.03)
TRICYCLICS UR QL SCN: NEGATIVE
UROBILINOGEN UR QL STRIP: NORMAL
WBC # BLD AUTO: 8.51 10*3/MM3 (ref 3.4–10.8)

## 2020-12-26 PROCEDURE — 80053 COMPREHEN METABOLIC PANEL: CPT | Performed by: PHYSICIAN ASSISTANT

## 2020-12-26 PROCEDURE — 81003 URINALYSIS AUTO W/O SCOPE: CPT | Performed by: PHYSICIAN ASSISTANT

## 2020-12-26 PROCEDURE — 85025 COMPLETE CBC W/AUTO DIFF WBC: CPT | Performed by: PHYSICIAN ASSISTANT

## 2020-12-26 PROCEDURE — 99283 EMERGENCY DEPT VISIT LOW MDM: CPT

## 2020-12-26 PROCEDURE — 82140 ASSAY OF AMMONIA: CPT | Performed by: PHYSICIAN ASSISTANT

## 2020-12-26 PROCEDURE — 80307 DRUG TEST PRSMV CHEM ANLYZR: CPT | Performed by: PHYSICIAN ASSISTANT

## 2020-12-26 PROCEDURE — 70450 CT HEAD/BRAIN W/O DYE: CPT

## 2020-12-26 RX ORDER — SODIUM CHLORIDE 0.9 % (FLUSH) 0.9 %
10 SYRINGE (ML) INJECTION AS NEEDED
Status: DISCONTINUED | OUTPATIENT
Start: 2020-12-26 | End: 2020-12-26 | Stop reason: HOSPADM
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
## 2020-12-26 NOTE — DISCHARGE INSTRUCTIONS
Rest.  Call your PCP on Monday for next available appointment.  Return to the ER if you feel your symptoms are worsening.

## 2020-12-29 ENCOUNTER — OFFICE VISIT (OUTPATIENT)
Dept: INTERNAL MEDICINE | Facility: CLINIC | Age: 60
End: 2020-12-29

## 2020-12-29 VITALS
BODY MASS INDEX: 30.81 KG/M2 | HEART RATE: 96 BPM | WEIGHT: 208 LBS | TEMPERATURE: 97.6 F | DIASTOLIC BLOOD PRESSURE: 98 MMHG | OXYGEN SATURATION: 99 % | HEIGHT: 69 IN | SYSTOLIC BLOOD PRESSURE: 160 MMHG

## 2020-12-29 DIAGNOSIS — I10 ESSENTIAL HYPERTENSION: Primary | ICD-10-CM

## 2020-12-29 DIAGNOSIS — I50.9 CONGESTIVE HEART FAILURE, UNSPECIFIED HF CHRONICITY, UNSPECIFIED HEART FAILURE TYPE (HCC): ICD-10-CM

## 2020-12-29 DIAGNOSIS — F19.10 SUBSTANCE ABUSE (HCC): ICD-10-CM

## 2020-12-29 DIAGNOSIS — Z91.199 PERSONAL HISTORY OF NONCOMPLIANCE WITH MEDICAL TREATMENT, PRESENTING HAZARDS TO HEALTH: ICD-10-CM

## 2020-12-29 PROCEDURE — 99214 OFFICE O/P EST MOD 30 MIN: CPT | Performed by: NURSE PRACTITIONER

## 2020-12-29 RX ORDER — SPIRONOLACTONE 25 MG/1
25 TABLET ORAL DAILY
Qty: 30 TABLET | Refills: 4 | Status: SHIPPED | OUTPATIENT
Start: 2020-12-29 | End: 2021-04-06 | Stop reason: SDUPTHER

## 2020-12-29 RX ORDER — CARVEDILOL 6.25 MG/1
6.25 TABLET ORAL 2 TIMES DAILY WITH MEALS
Qty: 60 TABLET | Refills: 5 | Status: SHIPPED | OUTPATIENT
Start: 2020-12-29 | End: 2021-04-30 | Stop reason: SDUPTHER
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
## 2021-01-24 ENCOUNTER — APPOINTMENT (OUTPATIENT)
Dept: CT IMAGING | Facility: HOSPITAL | Age: 61
End: 2021-01-24

## 2021-01-24 ENCOUNTER — APPOINTMENT (OUTPATIENT)
Dept: GENERAL RADIOLOGY | Facility: HOSPITAL | Age: 61
End: 2021-01-24

## 2021-01-24 ENCOUNTER — HOSPITAL ENCOUNTER (EMERGENCY)
Facility: HOSPITAL | Age: 61
Discharge: LEFT BEFORE DISCHARGE | End: 2021-01-24
Attending: EMERGENCY MEDICINE | Admitting: EMERGENCY MEDICINE

## 2021-01-24 VITALS
WEIGHT: 220 LBS | OXYGEN SATURATION: 99 % | HEIGHT: 69 IN | DIASTOLIC BLOOD PRESSURE: 102 MMHG | HEART RATE: 87 BPM | BODY MASS INDEX: 32.58 KG/M2 | RESPIRATION RATE: 18 BRPM | TEMPERATURE: 98 F | SYSTOLIC BLOOD PRESSURE: 147 MMHG

## 2021-01-24 DIAGNOSIS — R07.9 ACUTE CHEST PAIN: Primary | ICD-10-CM

## 2021-01-24 DIAGNOSIS — I50.9 ACUTE ON CHRONIC CONGESTIVE HEART FAILURE, UNSPECIFIED HEART FAILURE TYPE (HCC): ICD-10-CM

## 2021-01-24 DIAGNOSIS — R06.02 SHORTNESS OF BREATH: ICD-10-CM

## 2021-01-24 LAB
ALBUMIN SERPL-MCNC: 3.8 G/DL (ref 3.5–5.2)
ALBUMIN/GLOB SERPL: 1.2 G/DL
ALP SERPL-CCNC: 110 U/L (ref 39–117)
ALT SERPL W P-5'-P-CCNC: 101 U/L (ref 1–41)
ANION GAP SERPL CALCULATED.3IONS-SCNC: 9 MMOL/L (ref 5–15)
AST SERPL-CCNC: 75 U/L (ref 1–40)
BASOPHILS # BLD AUTO: 0.04 10*3/MM3 (ref 0–0.2)
BASOPHILS NFR BLD AUTO: 0.5 % (ref 0–1.5)
BILIRUB SERPL-MCNC: 0.4 MG/DL (ref 0–1.2)
BUN SERPL-MCNC: 15 MG/DL (ref 8–23)
BUN/CREAT SERPL: 15.5 (ref 7–25)
CALCIUM SPEC-SCNC: 9.3 MG/DL (ref 8.6–10.5)
CHLORIDE SERPL-SCNC: 104 MMOL/L (ref 98–107)
CO2 SERPL-SCNC: 28 MMOL/L (ref 22–29)
CREAT SERPL-MCNC: 0.97 MG/DL (ref 0.76–1.27)
D DIMER PPP FEU-MCNC: 1.61 MCGFEU/ML (ref 0–0.56)
DEPRECATED RDW RBC AUTO: 46.5 FL (ref 37–54)
EOSINOPHIL # BLD AUTO: 0.08 10*3/MM3 (ref 0–0.4)
EOSINOPHIL NFR BLD AUTO: 1 % (ref 0.3–6.2)
ERYTHROCYTE [DISTWIDTH] IN BLOOD BY AUTOMATED COUNT: 13.8 % (ref 12.3–15.4)
GFR SERPL CREATININE-BSD FRML MDRD: 79 ML/MIN/1.73
GLOBULIN UR ELPH-MCNC: 3.1 GM/DL
GLUCOSE SERPL-MCNC: 114 MG/DL (ref 65–99)
HCT VFR BLD AUTO: 46.4 % (ref 37.5–51)
HGB BLD-MCNC: 14.5 G/DL (ref 13–17.7)
HOLD SPECIMEN: NORMAL
HOLD SPECIMEN: NORMAL
IMM GRANULOCYTES # BLD AUTO: 0.02 10*3/MM3 (ref 0–0.05)
IMM GRANULOCYTES NFR BLD AUTO: 0.2 % (ref 0–0.5)
LIPASE SERPL-CCNC: 13 U/L (ref 13–60)
LYMPHOCYTES # BLD AUTO: 2.43 10*3/MM3 (ref 0.7–3.1)
LYMPHOCYTES NFR BLD AUTO: 30.1 % (ref 19.6–45.3)
MCH RBC QN AUTO: 29.2 PG (ref 26.6–33)
MCHC RBC AUTO-ENTMCNC: 31.3 G/DL (ref 31.5–35.7)
MCV RBC AUTO: 93.4 FL (ref 79–97)
MONOCYTES # BLD AUTO: 0.64 10*3/MM3 (ref 0.1–0.9)
MONOCYTES NFR BLD AUTO: 7.9 % (ref 5–12)
NEUTROPHILS NFR BLD AUTO: 4.86 10*3/MM3 (ref 1.7–7)
NEUTROPHILS NFR BLD AUTO: 60.3 % (ref 42.7–76)
NRBC BLD AUTO-RTO: 0 /100 WBC (ref 0–0.2)
NT-PROBNP SERPL-MCNC: 1909 PG/ML (ref 0–900)
PLATELET # BLD AUTO: 214 10*3/MM3 (ref 140–450)
PMV BLD AUTO: 10.6 FL (ref 6–12)
POTASSIUM SERPL-SCNC: 4.8 MMOL/L (ref 3.5–5.2)
PROT SERPL-MCNC: 6.9 G/DL (ref 6–8.5)
QT INTERVAL: 414 MS
QTC INTERVAL: 511 MS
RBC # BLD AUTO: 4.97 10*6/MM3 (ref 4.14–5.8)
SODIUM SERPL-SCNC: 141 MMOL/L (ref 136–145)
TROPONIN T SERPL-MCNC: <0.01 NG/ML (ref 0–0.03)
WBC # BLD AUTO: 8.07 10*3/MM3 (ref 3.4–10.8)
WHOLE BLOOD HOLD SPECIMEN: NORMAL
WHOLE BLOOD HOLD SPECIMEN: NORMAL

## 2021-01-24 PROCEDURE — 71045 X-RAY EXAM CHEST 1 VIEW: CPT

## 2021-01-24 PROCEDURE — 84484 ASSAY OF TROPONIN QUANT: CPT | Performed by: EMERGENCY MEDICINE

## 2021-01-24 PROCEDURE — 93005 ELECTROCARDIOGRAM TRACING: CPT | Performed by: EMERGENCY MEDICINE

## 2021-01-24 PROCEDURE — 85379 FIBRIN DEGRADATION QUANT: CPT | Performed by: EMERGENCY MEDICINE

## 2021-01-24 PROCEDURE — 83690 ASSAY OF LIPASE: CPT | Performed by: EMERGENCY MEDICINE

## 2021-01-24 PROCEDURE — 85025 COMPLETE CBC W/AUTO DIFF WBC: CPT | Performed by: EMERGENCY MEDICINE

## 2021-01-24 PROCEDURE — 80053 COMPREHEN METABOLIC PANEL: CPT | Performed by: EMERGENCY MEDICINE

## 2021-01-24 PROCEDURE — 99284 EMERGENCY DEPT VISIT MOD MDM: CPT

## 2021-01-24 PROCEDURE — 83880 ASSAY OF NATRIURETIC PEPTIDE: CPT | Performed by: EMERGENCY MEDICINE

## 2021-01-24 RX ORDER — ASPIRIN 81 MG/1
324 TABLET, CHEWABLE ORAL ONCE
Status: COMPLETED | OUTPATIENT
Start: 2021-01-24 | End: 2021-01-24

## 2021-01-24 RX ORDER — SODIUM CHLORIDE 0.9 % (FLUSH) 0.9 %
10 SYRINGE (ML) INJECTION AS NEEDED
Status: DISCONTINUED | OUTPATIENT
Start: 2021-01-24 | End: 2021-01-24 | Stop reason: HOSPADM

## 2021-01-24 RX ADMIN — ASPIRIN 81 MG CHEWABLE TABLET 324 MG: 81 TABLET CHEWABLE at 00:35

## 2021-01-24 NOTE — ED PROVIDER NOTES
EMERGENCY DEPARTMENT ENCOUNTER      Pt Name: Alejo Condon  MRN: [id number]  YOB: 1960  Date of evaluation: 1/24/2021  Provider: Ezekiel Salazar MD    CHIEF COMPLAINT       Chief Complaint   Patient presents with   • Chest Pain   • Shortness of Breath         HISTORY OF PRESENT ILLNESS  (Location/Symptom, Timing/Onset, Context/Setting, Quality, Duration, Modifying Factors, Severity.)   Alejo Condon is a 60 y.o. male who presents to the emergency department with worsening chest pain on exertion over the course of the past week in addition to shortness of breath.  Patient does admit to amphetamine and cocaine abuse over the past week as well.  Has a history of systolic CHF and also admits to noncompliance with his diuretics.  He does have a history of coronary disease.  He describes his symptoms as moderate in severity and worse with exertion. Patient denies any history of VTE as well as any recent surgery, hospitalization, long distance travel, swelling or pain in the lower extremities, or exogenous hormone use.        Nursing notes were reviewed.    REVIEW OF SYSTEMS    (2-9 systems for level 4, 10 or more for level 5)   ROS:  General:  No fevers, no chills, no weakness  Cardiovascular:  Chest pain  Respiratory:  Shortness of breath  Gastrointestinal:  No pain, no nausea, no vomiting, no diarrhea  Musculoskeletal:  No muscle pain, no joint pain  Skin:  No rash, no easy bruising  Neurologic:  No speech problems, no headache, no extremity numbness, no extremity tingling, no extremity weakness  Psychiatric:  No anxiety  Genitourinary:  No dysuria, no hematuria    Except as noted above the remainder of the review of systems was reviewed and negative.       PAST MEDICAL HISTORY     Past Medical History:   Diagnosis Date   • Acute systolic CHF (congestive heart failure) (CMS/[facility]) 5/13/2020    Echo 5/6/20 LVEF = 31.0%. Left ventricular diastolic dysfunction is noted (grade III w/high LAP) consistent  "with reversible restrictive pattern Left atrial volume is moderately increased.   • Diverticulitis    • Hyperlipidemia    • Hypertension    • Opiate addiction (CMS/HCC)     Resolved since on suboxone         SURGICAL HISTORY       Past Surgical History:   Procedure Laterality Date   • ANAL FISTULA REPAIR N/A    • CARDIAC CATHETERIZATION N/A 8/25/2020    Procedure: Left Heart Cath;  Surgeon: Garrett Trejo MD;  Location: [facility] CATH INVASIVE LOCATION;  Service: Cardiovascular;  Laterality: N/A;   • FINGER SURGERY           CURRENT MEDICATIONS     No current facility-administered medications for this encounter.     Current Outpatient Medications:   •  albuterol sulfate  (90 Base) MCG/ACT inhaler, Inhale 2 puffs Every 4 (Four) Hours As Needed for Wheezing or Shortness of Air., Disp: 1 inhaler, Rfl: 0  •  carvedilol (COREG) 6.25 MG tablet, Take 1 tablet by mouth 2 (Two) Times a Day With Meals., Disp: 60 tablet, Rfl: 5  •  spironolactone (Aldactone) 25 MG tablet, Take 1 tablet by mouth Daily., Disp: 30 tablet, Rfl: 4    ALLERGIES     Patient has no known allergies.    FAMILY HISTORY       Family History   Problem Relation Age of Onset   • Hypertension Mother    • Hyperlipidemia Mother    • Heart failure Mother    • Heart attack Father 88   • Cardiomyopathy Brother 36        Patient reports he had SCD that they attributed to agent orange          SOCIAL HISTORY       Social History     Socioeconomic History   • Marital status:      Spouse name: Not on file   • Number of children: Not on file   • Years of education: Not on file   • Highest education level: Not on file   Tobacco Use   • Smoking status: Current Some Day Smoker     Packs/day: 0.00   • Smokeless tobacco: Never Used   • Tobacco comment: on and off since he was 6 years old   Substance and Sexual Activity   • Alcohol use: Yes     Drinks per session: 10 or more     Binge frequency: Never     Comment: \"every other day\"   • Drug use: Yes     Types: " "Marijuana     Comment: quit pills  2018 but still smokes marijuana   • Sexual activity: Defer   Social History Narrative    caffeine use: 2 servings of coffee daily         PHYSICAL EXAM    (up to 7 for level 4, 8 or more for level 5)     Vitals:    01/24/21 0023 01/24/21 0037 01/24/21 0100 01/24/21 0130   BP: (!) 148/110 (!) 143/104 (!) 138/101 (!) 147/102   Pulse: 94  87 87   Resp: 18      Temp: 98 °F (36.7 °C)      SpO2: 98% 99% 91% 99%   Weight: 99.8 kg (220 lb)      Height: 175.3 cm (69\")          Physical Exam  General: Awake, alert, no acute distress.  HEENT: Conjunctiva normal.  Neck: Trachea midline.  Cardiac: Heart regular rate, rhythm, no murmurs, rubs, or gallops  Lungs: Lungs are clear to auscultation, there is no wheezing, rhonchi, or rales. There is no use of accessory muscles.  Chest wall: There is no tenderness to palpation over the chest wall or over ribs  Abdomen: Abdomen is soft, nontender, nondistended. There are no firm or pulsatile masses, no rebound rigidity or guarding.   Musculoskeletal: 1+ pitting edema BLE  Neuro: Alert and oriented x 4.  Dermatology: Skin is warm and dry  Psych: Mentation is grossly normal, cognition is grossly normal. Affect is appropriate.        DIAGNOSTIC RESULTS     EKG: All EKG's are interpreted by the Emergency Department Physician who either signs or Co-signs this chart in the absence of a cardiologist.    Sinus rhythm rate of 92, isolated T wave inversions in leads V5 and V6 same as previous ECG, QTC is 511    RADIOLOGY:   Non-plain film images such as CT, Ultrasound and MRI are read by the radiologist. Plain radiographic images are visualized and preliminarily interpreted by the emergency physician with the below findings:      [x] Radiologist's Report Reviewed:  XR Chest 1 View   Final Result   Findings suggestive of mild CHF.      Signer Name: Garrett Rodgers MD    Signed: 1/24/2021 1:09 AM    Workstation Name: [email or web address]     [facility] " [place]          LABS:    I have reviewed and interpreted all of the currently available lab results from this visit (if applicable):  Results for orders placed or performed during the hospital encounter of 01/24/21   Troponin    Specimen: Blood   Result Value Ref Range    Troponin T <0.010 0.000 - 0.030 ng/mL   Comprehensive Metabolic Panel    Specimen: Blood   Result Value Ref Range    Glucose 114 (H) 65 - 99 mg/dL    BUN 15 8 - 23 mg/dL    Creatinine 0.97 0.76 - 1.27 mg/dL    Sodium 141 136 - 145 mmol/L    Potassium 4.8 3.5 - 5.2 mmol/L    Chloride 104 98 - 107 mmol/L    CO2 28.0 22.0 - 29.0 mmol/L    Calcium 9.3 8.6 - 10.5 mg/dL    Total Protein 6.9 6.0 - 8.5 g/dL    Albumin 3.80 3.50 - 5.20 g/dL    ALT (SGPT) 101 (H) 1 - 41 U/L    AST (SGOT) 75 (H) 1 - 40 U/L    Alkaline Phosphatase 110 39 - 117 U/L    Total Bilirubin 0.4 0.0 - 1.2 mg/dL    eGFR Non African Amer 79 >60 mL/min/1.73    Globulin 3.1 gm/dL    A/G Ratio 1.2 g/dL    BUN/Creatinine Ratio 15.5 7.0 - 25.0    Anion Gap 9.0 5.0 - 15.0 mmol/L   Lipase    Specimen: Blood   Result Value Ref Range    Lipase 13 13 - 60 U/L   BNP    Specimen: Blood   Result Value Ref Range    proBNP 1,909.0 (H) 0.0 - 900.0 pg/mL   CBC Auto Differential    Specimen: Blood   Result Value Ref Range    WBC 8.07 3.40 - 10.80 10*3/mm3    RBC 4.97 4.14 - 5.80 10*6/mm3    Hemoglobin 14.5 13.0 - 17.7 g/dL    Hematocrit 46.4 37.5 - 51.0 %    MCV 93.4 79.0 - 97.0 fL    MCH 29.2 26.6 - 33.0 pg    MCHC 31.3 (L) 31.5 - 35.7 g/dL    RDW 13.8 12.3 - 15.4 %    RDW-SD 46.5 37.0 - 54.0 fl    MPV 10.6 6.0 - 12.0 fL    Platelets 214 140 - 450 10*3/mm3    Neutrophil % 60.3 42.7 - 76.0 %    Lymphocyte % 30.1 19.6 - 45.3 %    Monocyte % 7.9 5.0 - 12.0 %    Eosinophil % 1.0 0.3 - 6.2 %    Basophil % 0.5 0.0 - 1.5 %    Immature Grans % 0.2 0.0 - 0.5 %    Neutrophils, Absolute 4.86 1.70 - 7.00 10*3/mm3    Lymphocytes, Absolute 2.43 0.70 - 3.10 10*3/mm3    Monocytes, Absolute 0.64 0.10 - 0.90 10*3/mm3  "   Eosinophils, Absolute 0.08 0.00 - 0.40 10*3/mm3    Basophils, Absolute 0.04 0.00 - 0.20 10*3/mm3    Immature Grans, Absolute 0.02 0.00 - 0.05 10*3/mm3    nRBC 0.0 0.0 - 0.2 /100 WBC   D-dimer, Quantitative    Specimen: Blood   Result Value Ref Range    D-Dimer, Quantitative 1.61 (H) 0.00 - 0.56 MCGFEU/mL   ECG 12 Lead   Result Value Ref Range    QT Interval 414 ms    QTC Interval 511 ms   Light Blue Top   Result Value Ref Range    Extra Tube hold for add-on    Green Top (Gel)   Result Value Ref Range    Extra Tube Hold for add-ons.    Lavender Top   Result Value Ref Range    Extra Tube hold for add-on    Gold Top - SST   Result Value Ref Range    Extra Tube Hold for add-ons.         All other labs were within normal range or not returned as of this dictation.      EMERGENCY DEPARTMENT COURSE and DIFFERENTIAL DIAGNOSIS/MDM:   Vitals:    Vitals:    01/24/21 0023 01/24/21 0037 01/24/21 0100 01/24/21 0130   BP: (!) 148/110 (!) 143/104 (!) 138/101 (!) 147/102   Pulse: 94  87 87   Resp: 18      Temp: 98 °F (36.7 °C)      SpO2: 98% 99% 91% 99%   Weight: 99.8 kg (220 lb)      Height: 175.3 cm (69\")               Patient presented with chest pain concerning for ischemic chest pain and possibly unstable angina with worsening exertional symptoms over the course of the past week, however his initial ECG demonstrated no evidence of acute change and initial troponin is noted to be negative.  In regards to shortness of breath, this is likely secondary to CHF exacerbation secondary to noncompliance.  He has evidence of volume overload on chest x-ray with no concomitant evidence of pneumonia or pneumothorax and BNP is also noted to be elevated.  Also obtain a D-dimer to rule out PE and this was noted to be elevated as well and so CTA chest was ordered.  Unfortunately during the course of his emergency department stay, patient eloped from the emergency department prior to me having the ability to have a conversation with him " regarding this.  Upon review of his record later, it was revealed that the patient had left after becoming upset that he was not given water.          MEDICATIONS ADMINISTERED IN ED:  Medications   aspirin chewable tablet 324 mg (324 mg Oral Given 1/24/21 0035)         FINAL IMPRESSION      1. Acute chest pain    2. Shortness of breath    3. Acute on chronic congestive heart failure, unspecified heart failure type (CMS/[facility])          DISPOSITION/PLAN     ED Disposition     ED Disposition Condition Comment    AMA  Pt left room and stopped in for IV removal, explained to pt it was in his interest to stay for CTA of chest and 2nd set troponin. He stated he was leaving because he asked for water and no one brought him any. IV removed.           PATIENT REFERRED TO:  No follow-up provider specified.    DISCHARGE MEDICATIONS:     Medication List      ASK your doctor about these medications    albuterol sulfate  (90 Base) MCG/ACT inhaler  Commonly known as: PROVENTIL HFA;VENTOLIN HFA;PROAIR HFA  Inhale 2 puffs Every 4 (Four) Hours As Needed for Wheezing or Shortness of Air.     carvedilol 6.25 MG tablet  Commonly known as: COREG  Take 1 tablet by mouth 2 (Two) Times a Day With Meals.     spironolactone 25 MG tablet  Commonly known as: Aldactone  Take 1 tablet by mouth Daily.                Comment: Please note this report has been produced using speech recognition software.      Ezekiel Salazar MD  Attending Emergency Physician               Ezekiel Salazar MD  01/24/21 [id number]

## 2021-03-01 ENCOUNTER — TELEPHONE (OUTPATIENT)
Dept: INTERNAL MEDICINE | Facility: CLINIC | Age: 61
End: 2021-03-01

## 2021-04-06 ENCOUNTER — OFFICE VISIT (OUTPATIENT)
Dept: INTERNAL MEDICINE | Facility: CLINIC | Age: 61
End: 2021-04-06

## 2021-04-06 VITALS
WEIGHT: 230.8 LBS | HEART RATE: 85 BPM | SYSTOLIC BLOOD PRESSURE: 134 MMHG | BODY MASS INDEX: 34.08 KG/M2 | TEMPERATURE: 97.8 F | DIASTOLIC BLOOD PRESSURE: 86 MMHG | OXYGEN SATURATION: 97 %

## 2021-04-06 DIAGNOSIS — I50.22 CHRONIC SYSTOLIC CONGESTIVE HEART FAILURE (HCC): ICD-10-CM

## 2021-04-06 DIAGNOSIS — I10 ESSENTIAL HYPERTENSION: ICD-10-CM

## 2021-04-06 PROCEDURE — 99213 OFFICE O/P EST LOW 20 MIN: CPT | Performed by: NURSE PRACTITIONER

## 2021-04-06 RX ORDER — SPIRONOLACTONE 50 MG/1
50 TABLET, FILM COATED ORAL DAILY
Qty: 30 TABLET | Refills: 5 | Status: SHIPPED | OUTPATIENT
Start: 2021-04-06 | End: 2021-05-14

## 2021-04-06 NOTE — PROGRESS NOTES
Chief Complaint   Patient presents with   • Leg Swelling       History of Present Illness    61 y.o.male presents for leg swelling; chronic with intermittent worsening. Swelling has been more prominent over last couple weeks. Feels like diuretic not working as well. Positive chf with med noncompliance. Currently taking coreg and aldactone. Was not able to afford entresto in past but states his insurance should cover it now. Has not seen cardiology for FU since Aug last year no future appts. Positive for shortness of breath. No chest pain. Fatigue sleepiness. Admits to still having some intermittent substance use with thc, alcohol.    Review of Systems   Constitutional: Positive for fatigue. Negative for appetite change and fever.   Respiratory: Positive for shortness of breath. Negative for cough.    Cardiovascular: Positive for leg swelling. Negative for chest pain and palpitations.   Gastrointestinal: Negative for constipation, diarrhea, nausea and vomiting.   Genitourinary: Negative for difficulty urinating.   Psychiatric/Behavioral: Negative for depressed mood. The patient is not nervous/anxious.            [facility]  The following portions of the patient's history were reviewed and updated as appropriate: allergies, current medications, past family history, past medical history, past social history, past surgical history and problem list.     Past Medical History:   Diagnosis Date   • Acute systolic CHF (congestive heart failure) (CMS/HCC) 5/13/2020    Echo 5/6/20 LVEF = 31.0%. Left ventricular diastolic dysfunction is noted (grade III w/high LAP) consistent with reversible restrictive pattern Left atrial volume is moderately increased.   • Diverticulitis    • Hyperlipidemia    • Hypertension    • Opiate addiction (CMS/HCC)     Resolved since on suboxone      No Known Allergies   Social History     Tobacco Use   • Smoking status: Current Some Day Smoker     Packs/day: 0.00   • Smokeless tobacco: Never Used   •  "Tobacco comment: on and off since he was 6 years old   Substance Use Topics   • Alcohol use: Yes     Comment: \"every other day\"   • Drug use: Yes     Types: Marijuana     Comment: quit pills  2018 but still smokes marijuana         Current Outpatient Medications:   •  albuterol sulfate  (90 Base) MCG/ACT inhaler, Inhale 2 puffs Every 4 (Four) Hours As Needed for Wheezing or Shortness of Air., Disp: 1 inhaler, Rfl: 0  •  carvedilol (COREG) 6.25 MG tablet, Take 1 tablet by mouth 2 (Two) Times a Day With Meals., Disp: 60 tablet, Rfl: 5  •  spironolactone (Aldactone) 25 MG tablet, Take 1 tablet by mouth Daily., Disp: 30 tablet, Rfl: 4    VITALS:  /86   Pulse 85   Temp 97.8 °F (36.6 °C)   Wt 105 kg (230 lb 12.8 oz)   SpO2 97%   BMI 34.08 kg/m²     Physical Exam  Constitutional:       General: He is not in acute distress.  Cardiovascular:      Rate and Rhythm: Normal rate and regular rhythm.      Heart sounds: Normal heart sounds.   Pulmonary:      Effort: Pulmonary effort is normal. No respiratory distress.      Breath sounds: Normal breath sounds.   Musculoskeletal:      Right lower le+ Edema present.      Left lower le+ Edema present.   Skin:     General: Skin is warm and dry.   Neurological:      General: No focal deficit present.      Mental Status: He is oriented to person, place, and time.         Result Review :{  Comprehensive Metabolic Panel  Order: [id number]  Status:  Final result   Visible to patient:  No (not released) Next appt:  None  Specimen Information: Blood        Component   Ref Range & Units 2 mo ago   Glucose   65 - 99 mg/dL 114High     BUN   8 - 23 mg/dL 15    Creatinine   0.76 - 1.27 mg/dL 0.97    Sodium   136 - 145 mmol/L 141    Potassium   3.5 - 5.2 mmol/L 4.8    Comment: Slight hemolysis detected by analyzer. Results may be affected.   Chloride   98 - 107 mmol/L 104    CO2   22.0 - 29.0 mmol/L 28.0    Calcium   8.6 - 10.5 mg/dL 9.3    Total Protein   6.0 - 8.5 g/dL 6.9 "    Albumin   3.50 - 5.20 g/dL 3.80    ALT (SGPT)   1 - 41 U/L 101High     AST (SGOT)   1 - 40 U/L 75High     Alkaline Phosphatase   39 - 117 U/L 110    Total Bilirubin   0.0 - 1.2 mg/dL 0.4    eGFR Non African Amer   >60 mL/min/1.73 79    Globulin   gm/dL 3.1    A/G Ratio   g/dL 1.2    BUN/Creatinine Ratio   7.0 - 25.0 15.5    Anion Gap   5.0 - 15.0 mmol/L 9.0    Resulting Agency  [facility]      Narrative  Performed by:  [facility]  GFR Normal >60   Chronic Kidney Disease <60   Kidney Failure <15       Specimen Collected: 01/24/21 00:36 Last Resulted: 01/24/21 01:25               Assessment and Plan    Diagnoses and all orders for this visit:    1. Essential hypertension  -     spironolactone (Aldactone) 50 MG tablet; Take 1 tablet by mouth Daily.  Dispense: 30 tablet; Refill: 5    2. Chronic systolic congestive heart failure (CMS/HCC)  -     spironolactone (Aldactone) 50 MG tablet; Take 1 tablet by mouth Daily.  Dispense: 30 tablet; Refill: 5    increased aldactone; cont coreg. Recommended pt schedule follow up with cardiology. Need low sodium diet. If worsening short of air seek emergency care.      Follow Up   Return in about 6 months (around 10/6/2021), or if symptoms worsen or fail to improve, for Medicare Wellness.      I discussed the patients findings and my recommendations with patient.  Patient was encouraged to keep me informed of any acute changes, lack of improvement, or any new concerning symptoms.  Patient voiced understanding of all instructions and denied further questions.    Electronically signed by:    GAGAN Kerr  04/06/2021    EMR Dragon/Transcription Disclaimer:  Much of this encounter note is an electronic transcription/translation of spoken language to printed text.  The electronic translation of spoken language may permit erroneous, or at times, nonsensical words or phrases to be inadvertently transcribed.  Although I have reviewed the note for such errors, some may still  exist

## 2021-04-30 ENCOUNTER — LAB (OUTPATIENT)
Dept: LAB | Facility: HOSPITAL | Age: 61
End: 2021-04-30

## 2021-04-30 ENCOUNTER — HOSPITAL ENCOUNTER (OUTPATIENT)
Dept: CARDIOLOGY | Facility: HOSPITAL | Age: 61
Discharge: HOME | End: 2021-04-30

## 2021-04-30 ENCOUNTER — OFFICE VISIT (OUTPATIENT)
Dept: INTERNAL MEDICINE | Facility: CLINIC | Age: 61
End: 2021-04-30

## 2021-04-30 VITALS — HEIGHT: 69 IN | BODY MASS INDEX: 32.14 KG/M2 | WEIGHT: 217 LBS

## 2021-04-30 VITALS
WEIGHT: 217 LBS | SYSTOLIC BLOOD PRESSURE: 142 MMHG | BODY MASS INDEX: 32.14 KG/M2 | HEART RATE: 92 BPM | HEIGHT: 69 IN | OXYGEN SATURATION: 98 % | DIASTOLIC BLOOD PRESSURE: 100 MMHG | TEMPERATURE: 96.6 F

## 2021-04-30 DIAGNOSIS — M79.89 LEG SWELLING: ICD-10-CM

## 2021-04-30 DIAGNOSIS — M79.89 LEG SWELLING: Primary | ICD-10-CM

## 2021-04-30 DIAGNOSIS — I10 ESSENTIAL HYPERTENSION: ICD-10-CM

## 2021-04-30 DIAGNOSIS — I50.9 ACUTE ON CHRONIC CONGESTIVE HEART FAILURE, UNSPECIFIED HEART FAILURE TYPE (HCC): ICD-10-CM

## 2021-04-30 DIAGNOSIS — I50.9 CONGESTIVE HEART FAILURE, UNSPECIFIED HF CHRONICITY, UNSPECIFIED HEART FAILURE TYPE (HCC): ICD-10-CM

## 2021-04-30 LAB
ALBUMIN SERPL-MCNC: 3.5 G/DL (ref 3.5–5.2)
ALBUMIN/GLOB SERPL: 1.5 G/DL
ALP SERPL-CCNC: 71 U/L (ref 39–117)
ALT SERPL W P-5'-P-CCNC: 43 U/L (ref 1–41)
ANION GAP SERPL CALCULATED.3IONS-SCNC: 11.2 MMOL/L (ref 5–15)
AST SERPL-CCNC: 34 U/L (ref 1–40)
BASOPHILS # BLD AUTO: 0.04 10*3/MM3 (ref 0–0.2)
BASOPHILS NFR BLD AUTO: 0.6 % (ref 0–1.5)
BILIRUB SERPL-MCNC: 0.4 MG/DL (ref 0–1.2)
BUN SERPL-MCNC: 20 MG/DL (ref 8–23)
BUN/CREAT SERPL: 20.4 (ref 7–25)
CALCIUM SPEC-SCNC: 9.1 MG/DL (ref 8.6–10.5)
CHLORIDE SERPL-SCNC: 104 MMOL/L (ref 98–107)
CO2 SERPL-SCNC: 26.8 MMOL/L (ref 22–29)
CREAT SERPL-MCNC: 0.98 MG/DL (ref 0.76–1.27)
DEPRECATED RDW RBC AUTO: 46.7 FL (ref 37–54)
EOSINOPHIL # BLD AUTO: 0.06 10*3/MM3 (ref 0–0.4)
EOSINOPHIL NFR BLD AUTO: 0.9 % (ref 0.3–6.2)
ERYTHROCYTE [DISTWIDTH] IN BLOOD BY AUTOMATED COUNT: 13.8 % (ref 12.3–15.4)
GFR SERPL CREATININE-BSD FRML MDRD: 78 ML/MIN/1.73
GLOBULIN UR ELPH-MCNC: 2.3 GM/DL
GLUCOSE SERPL-MCNC: 65 MG/DL (ref 65–99)
HCT VFR BLD AUTO: 52 % (ref 37.5–51)
HGB BLD-MCNC: 16.6 G/DL (ref 13–17.7)
IMM GRANULOCYTES # BLD AUTO: 0.02 10*3/MM3 (ref 0–0.05)
IMM GRANULOCYTES NFR BLD AUTO: 0.3 % (ref 0–0.5)
LYMPHOCYTES # BLD AUTO: 1.9 10*3/MM3 (ref 0.7–3.1)
LYMPHOCYTES NFR BLD AUTO: 27.3 % (ref 19.6–45.3)
MCH RBC QN AUTO: 29.3 PG (ref 26.6–33)
MCHC RBC AUTO-ENTMCNC: 31.9 G/DL (ref 31.5–35.7)
MCV RBC AUTO: 91.9 FL (ref 79–97)
MONOCYTES # BLD AUTO: 0.54 10*3/MM3 (ref 0.1–0.9)
MONOCYTES NFR BLD AUTO: 7.8 % (ref 5–12)
NEUTROPHILS NFR BLD AUTO: 4.4 10*3/MM3 (ref 1.7–7)
NEUTROPHILS NFR BLD AUTO: 63.1 % (ref 42.7–76)
NRBC BLD AUTO-RTO: 0 /100 WBC (ref 0–0.2)
NT-PROBNP SERPL-MCNC: 1401 PG/ML (ref 0–900)
PLATELET # BLD AUTO: 166 10*3/MM3 (ref 140–450)
PMV BLD AUTO: 11.4 FL (ref 6–12)
POTASSIUM SERPL-SCNC: 4.9 MMOL/L (ref 3.5–5.2)
PROT SERPL-MCNC: 5.8 G/DL (ref 6–8.5)
RBC # BLD AUTO: 5.66 10*6/MM3 (ref 4.14–5.8)
SODIUM SERPL-SCNC: 142 MMOL/L (ref 136–145)
WBC # BLD AUTO: 6.96 10*3/MM3 (ref 3.4–10.8)

## 2021-04-30 PROCEDURE — 80053 COMPREHEN METABOLIC PANEL: CPT

## 2021-04-30 PROCEDURE — 93970 EXTREMITY STUDY: CPT

## 2021-04-30 PROCEDURE — 85025 COMPLETE CBC W/AUTO DIFF WBC: CPT

## 2021-04-30 PROCEDURE — 83880 ASSAY OF NATRIURETIC PEPTIDE: CPT

## 2021-04-30 PROCEDURE — 99215 OFFICE O/P EST HI 40 MIN: CPT | Performed by: NURSE PRACTITIONER

## 2021-04-30 PROCEDURE — 93970 EXTREMITY STUDY: CPT | Performed by: INTERNAL MEDICINE

## 2021-04-30 RX ORDER — CARVEDILOL 6.25 MG/1
6.25 TABLET ORAL 2 TIMES DAILY WITH MEALS
Qty: 60 TABLET | Refills: 5 | Status: SHIPPED | OUTPATIENT
Start: 2021-04-30 | End: 2022-06-06 | Stop reason: SDUPTHER

## 2021-04-30 NOTE — PROGRESS NOTES
Chief Complaint   Patient presents with   • Leg Swelling       History of Present Illness    61 y.o.male presents for leg swelling.  Patient had his Sister Crystal Ireland on phone and his patient advocate.  Patient states he has a hard time remembering some things after his appointments.  Patient describes continuing lower extremity swelling edema bilateral.  Positive for shortness of breath.  Symptoms have been ongoing for several weeks. Positive for redness and calf pain lower ext.  Has known history of congestive heart failure and hypertension.  Has not seen cardiology since around August of last year.  Entresto had been started back last fall but patient was not able to afford medication.  Patient was seen in office back on April 6 with similar symptoms, and spironolactone increased.  Pt did not make FU appt with cardiology after last visit.  He does report had missed appts. When review current meds with pt he is not sure where his coreg is and if has been taking. Continues to drink alcohol; not as much as before but still drinks beer but not every day.     (recent drug screen dec ER showed methamphetamines and amphetamines however patient denies personal use of these. [facility] ER short of air and chest pain; admitted to amphetamine and cocaine abuse over the past week; left AMA before workup completed then.)     Review of Systems   Constitutional: Negative for appetite change, chills and fever.   Eyes: Negative for blurred vision and visual disturbance.   Respiratory: Positive for shortness of breath and wheezing. Negative for cough.    Cardiovascular: Positive for leg swelling. Negative for chest pain and palpitations.   Genitourinary: Negative for difficulty urinating.   Neurological: Positive for memory problem. Negative for dizziness, light-headedness and headache.       [facility]  The following portions of the patient's history were reviewed and updated as appropriate: allergies, current medications, past family  "history, past medical history, past social history, past surgical history and problem list.     Past Medical History:   Diagnosis Date   • Acute systolic CHF (congestive heart failure) (CMS/HCC) 5/13/2020    Echo 5/6/20 LVEF = 31.0%. Left ventricular diastolic dysfunction is noted (grade III w/high LAP) consistent with reversible restrictive pattern Left atrial volume is moderately increased.   • Diverticulitis    • Hyperlipidemia    • Hypertension    • Opiate addiction (CMS/HCC)     Resolved since on suboxone      No Known Allergies   Social History     Tobacco Use   • Smoking status: Current Some Day Smoker     Packs/day: 0.00   • Smokeless tobacco: Never Used   • Tobacco comment: on and off since he was 6 years old   Substance Use Topics   • Alcohol use: Yes     Comment: \"every other day\"   • Drug use: Yes     Types: Marijuana     Comment: quit pills  2018 but still smokes marijuana         Current Outpatient Medications:   •  albuterol sulfate  (90 Base) MCG/ACT inhaler, Inhale 2 puffs Every 4 (Four) Hours As Needed for Wheezing or Shortness of Air., Disp: 1 inhaler, Rfl: 0  •  carvedilol (COREG) 6.25 MG tablet, Take 1 tablet by mouth 2 (Two) Times a Day With Meals., Disp: 60 tablet, Rfl: 5  •  spironolactone (Aldactone) 50 MG tablet, Take 1 tablet by mouth Daily., Disp: 30 tablet, Rfl: 5    VITALS:  /100   Pulse 92   Temp 96.6 °F (35.9 °C)   Ht 175.3 cm (69\")   Wt 98.4 kg (217 lb)   SpO2 98%   BMI 32.05 kg/m²     Physical Exam  HENT:      Head: Normocephalic.   Eyes:      Extraocular Movements: Extraocular movements intact.      Pupils: Pupils are equal, round, and reactive to light.   Cardiovascular:      Rate and Rhythm: Normal rate and regular rhythm.      Heart sounds: Normal heart sounds.      Comments: Positive calf pain; no warmth. Pitting edema mid shin.  Pulmonary:      Effort: Pulmonary effort is normal. No respiratory distress.      Breath sounds: Examination of the right-lower " field reveals rales. Examination of the left-lower field reveals rales. Rales present.   Musculoskeletal:      Right lower le+ Pitting Edema present.      Left lower le+ Pitting Edema present.   Skin:     General: Skin is warm and dry.   Neurological:      General: No focal deficit present.      Mental Status: He is alert and oriented to person, place, and time.   Psychiatric:         Mood and Affect: Mood normal.         Result Review :            Assessment and Plan    Diagnoses and all orders for this visit:    1. Leg swelling (Primary)  -     Duplex Venous Lower Extremity - Bilateral CAR; Future    2. Acute on chronic congestive heart failure, unspecified heart failure type (CMS/HCC)  -     carvedilol (COREG) 6.25 MG tablet; Take 1 tablet by mouth 2 (Two) Times a Day With Meals.  Dispense: 60 tablet; Refill: 5  -     sacubitril-valsartan (ENTRESTO) 24-26 MG tablet; Take 1 tablet by mouth 2 (Two) Times a Day.  Dispense: 60 tablet; Refill: 0  -     Ambulatory Referral to Cardiology  -     proBNP; Future    3. Essential hypertension  -     carvedilol (COREG) 6.25 MG tablet; Take 1 tablet by mouth 2 (Two) Times a Day With Meals.  Dispense: 60 tablet; Refill: 5  -     Comprehensive Metabolic Panel; Future  -     CBC & Differential; Future    Anticipate his leg swelling and shortness of breath are due to worsening chf. Check doppler since does specifically note calf pain with the swelling. His o2 sat is ok; vitals stable will try to treat outpt. Check labs. If bnp too high will alert pt for ER eval. Of course if any worsening symptoms should seek emergency care.  Will try to assist pt in getting an appt set up with cardiology.  Stressed importance of attending appts. Reviewed risks benefits of entresto. Pt insists he can afford this medication now and his insurance should cover. Resumed at lower dose; check lab may need to lower his spironolactone due to risk of hyperkalemia. Reviewed high potassium foods to  avoid. Both pt and his sister verbalized understanding. His sister is going to try to help him remember meds and appts.      I spent 49 minutes caring for Alejo on this date of service. This time includes time spent by me in the following activities:preparing for the visit, reviewing tests, obtaining and/or reviewing a separately obtained history, performing a medically appropriate examination and/or evaluation , counseling and educating the patient/family/caregiver, ordering medications, tests, or procedures and documenting information in the medical record  Follow Up   Return in about 2 weeks (around 5/14/2021), or if symptoms worsen or fail to improve, for Recheck.      I discussed the patients findings and my recommendations with patient.  Patient was encouraged to keep me informed of any acute changes, lack of improvement, or any new concerning symptoms.  Patient voiced understanding of all instructions and denied further questions.    Electronically signed by:    GAGAN Kerr  04/30/2021    EMR Dragon/Transcription Disclaimer:  Much of this encounter note is an electronic transcription/translation of spoken language to printed text.  The electronic translation of spoken language may permit erroneous, or at times, nonsensical words or phrases to be inadvertently transcribed.  Although I have reviewed the note for such errors, some may still exist

## 2021-05-01 ENCOUNTER — TELEPHONE (OUTPATIENT)
Dept: INTERNAL MEDICINE | Facility: CLINIC | Age: 61
End: 2021-05-01

## 2021-05-01 NOTE — PROGRESS NOTES
Call patient with results.  No anemia.  Electrolytes okay.  His potassium is 4.9 which is on the higher end of normal.  as a reminder he needs to avoid high potassium containing foods such as no bananas or oranges.  He will need close follow-up of labs regarding his potassium with the medications that he is on.  Regarding his heart failure lab this is elevated but not as high as it was 3 months ago when he was at the emergency room.  It appears that he has a cardiology appointment scheduled on May 14 with Dr. Trejo's office.  Very important that he keeps this appointment for management of his heart failure, leg swelling, and help with shortness of breath.  Any worsening of symptoms would go to the emergency room. (Pt had his sister on phone with him during yesterdays's appt, as pt states he does not always remember things. I tried to call pt sister and no answer this morning.)

## 2021-05-03 ENCOUNTER — TELEPHONE (OUTPATIENT)
Dept: CARDIOLOGY | Facility: CLINIC | Age: 61
End: 2021-05-03

## 2021-05-03 LAB
BH CV ECHO MEAS - BSA(HAYCOCK): 2.2 M^2
BH CV ECHO MEAS - BSA: 2.1 M^2
BH CV ECHO MEAS - BZI_BMI: 32 KILOGRAMS/M^2
BH CV ECHO MEAS - BZI_METRIC_HEIGHT: 175.3 CM
BH CV ECHO MEAS - BZI_METRIC_WEIGHT: 98.4 KG
BH CV LOWER VASCULAR LEFT COMMON FEMORAL AUGMENT: NORMAL
BH CV LOWER VASCULAR LEFT COMMON FEMORAL COMPRESS: NORMAL
BH CV LOWER VASCULAR LEFT COMMON FEMORAL PHASIC: NORMAL
BH CV LOWER VASCULAR LEFT COMMON FEMORAL SPONT: NORMAL
BH CV LOWER VASCULAR LEFT DISTAL FEMORAL AUGMENT: NORMAL
BH CV LOWER VASCULAR LEFT DISTAL FEMORAL COMPRESS: NORMAL
BH CV LOWER VASCULAR LEFT DISTAL FEMORAL PHASIC: NORMAL
BH CV LOWER VASCULAR LEFT DISTAL FEMORAL SPONT: NORMAL
BH CV LOWER VASCULAR LEFT GASTRONEMIUS COMPRESS: NORMAL
BH CV LOWER VASCULAR LEFT GREATER SAPH AK COMPRESS: NORMAL
BH CV LOWER VASCULAR LEFT GREATER SAPH BK COMPRESS: NORMAL
BH CV LOWER VASCULAR LEFT LESSER SAPH COMPRESS: NORMAL
BH CV LOWER VASCULAR LEFT MID FEMORAL AUGMENT: NORMAL
BH CV LOWER VASCULAR LEFT MID FEMORAL COMPETENT: NORMAL
BH CV LOWER VASCULAR LEFT MID FEMORAL COMPRESS: NORMAL
BH CV LOWER VASCULAR LEFT MID FEMORAL PHASIC: NORMAL
BH CV LOWER VASCULAR LEFT MID FEMORAL SPONT: NORMAL
BH CV LOWER VASCULAR LEFT PERONEAL COMPRESS: NORMAL
BH CV LOWER VASCULAR LEFT POPLITEAL AUGMENT: NORMAL
BH CV LOWER VASCULAR LEFT POPLITEAL COMPRESS: NORMAL
BH CV LOWER VASCULAR LEFT POPLITEAL PHASIC: NORMAL
BH CV LOWER VASCULAR LEFT POPLITEAL SPONT: NORMAL
BH CV LOWER VASCULAR LEFT POSTERIOR TIBIAL COMPRESS: NORMAL
BH CV LOWER VASCULAR LEFT PROFUNDA FEMORAL AUGMENT: NORMAL
BH CV LOWER VASCULAR LEFT PROFUNDA FEMORAL COMPRESS: NORMAL
BH CV LOWER VASCULAR LEFT PROFUNDA FEMORAL PHASIC: NORMAL
BH CV LOWER VASCULAR LEFT PROFUNDA FEMORAL SPONT: NORMAL
BH CV LOWER VASCULAR LEFT PROXIMAL FEMORAL AUGMENT: NORMAL
BH CV LOWER VASCULAR LEFT PROXIMAL FEMORAL COMPRESS: NORMAL
BH CV LOWER VASCULAR LEFT PROXIMAL FEMORAL PHASIC: NORMAL
BH CV LOWER VASCULAR LEFT PROXIMAL FEMORAL SPONT: NORMAL
BH CV LOWER VASCULAR LEFT SAPHENOFEMORAL JUNCTION AUGMENT: NORMAL
BH CV LOWER VASCULAR LEFT SAPHENOFEMORAL JUNCTION COMPRESS: NORMAL
BH CV LOWER VASCULAR LEFT SAPHENOFEMORAL JUNCTION PHASIC: NORMAL
BH CV LOWER VASCULAR LEFT SAPHENOFEMORAL JUNCTION SPONT: NORMAL
BH CV LOWER VASCULAR RIGHT COMMON FEMORAL AUGMENT: NORMAL
BH CV LOWER VASCULAR RIGHT COMMON FEMORAL COMPRESS: NORMAL
BH CV LOWER VASCULAR RIGHT COMMON FEMORAL PHASIC: NORMAL
BH CV LOWER VASCULAR RIGHT COMMON FEMORAL SPONT: NORMAL
BH CV LOWER VASCULAR RIGHT DISTAL FEMORAL AUGMENT: NORMAL
BH CV LOWER VASCULAR RIGHT DISTAL FEMORAL COMPRESS: NORMAL
BH CV LOWER VASCULAR RIGHT DISTAL FEMORAL PHASIC: NORMAL
BH CV LOWER VASCULAR RIGHT DISTAL FEMORAL SPONT: NORMAL
BH CV LOWER VASCULAR RIGHT GASTRONEMIUS COMPRESS: NORMAL
BH CV LOWER VASCULAR RIGHT GREATER SAPH AK COMPRESS: NORMAL
BH CV LOWER VASCULAR RIGHT GREATER SAPH BK COMPRESS: NORMAL
BH CV LOWER VASCULAR RIGHT LESSER SAPH COMPRESS: NORMAL
BH CV LOWER VASCULAR RIGHT MID FEMORAL AUGMENT: NORMAL
BH CV LOWER VASCULAR RIGHT MID FEMORAL COMPRESS: NORMAL
BH CV LOWER VASCULAR RIGHT MID FEMORAL PHASIC: NORMAL
BH CV LOWER VASCULAR RIGHT MID FEMORAL SPONT: NORMAL
BH CV LOWER VASCULAR RIGHT PERONEAL COMPRESS: NORMAL
BH CV LOWER VASCULAR RIGHT POPLITEAL AUGMENT: NORMAL
BH CV LOWER VASCULAR RIGHT POPLITEAL COMPETENT: NORMAL
BH CV LOWER VASCULAR RIGHT POPLITEAL COMPRESS: NORMAL
BH CV LOWER VASCULAR RIGHT POPLITEAL PHASIC: NORMAL
BH CV LOWER VASCULAR RIGHT POPLITEAL SPONT: NORMAL
BH CV LOWER VASCULAR RIGHT POSTERIOR TIBIAL COMPRESS: NORMAL
BH CV LOWER VASCULAR RIGHT PROFUNDA FEMORAL AUGMENT: NORMAL
BH CV LOWER VASCULAR RIGHT PROFUNDA FEMORAL COMPRESS: NORMAL
BH CV LOWER VASCULAR RIGHT PROFUNDA FEMORAL PHASIC: NORMAL
BH CV LOWER VASCULAR RIGHT PROFUNDA FEMORAL SPONT: NORMAL
BH CV LOWER VASCULAR RIGHT PROXIMAL FEMORAL AUGMENT: NORMAL
BH CV LOWER VASCULAR RIGHT PROXIMAL FEMORAL COMPRESS: NORMAL
BH CV LOWER VASCULAR RIGHT PROXIMAL FEMORAL PHASIC: NORMAL
BH CV LOWER VASCULAR RIGHT PROXIMAL FEMORAL SPONT: NORMAL
BH CV LOWER VASCULAR RIGHT SAPHENOFEMORAL JUNCTION AUGMENT: NORMAL
BH CV LOWER VASCULAR RIGHT SAPHENOFEMORAL JUNCTION COMPRESS: NORMAL
BH CV LOWER VASCULAR RIGHT SAPHENOFEMORAL JUNCTION PHASIC: NORMAL
BH CV LOWER VASCULAR RIGHT SAPHENOFEMORAL JUNCTION SPONT: NORMAL

## 2021-05-03 NOTE — TELEPHONE ENCOUNTER
Pt LVM wanting an appt with [facility] for swelling in his legs.   Called pt back no answer. LVM stating he has an appt already schedule with [facility] for 5/14 at 3 pm. Advised to call me back if he cannot make that appointment. Will await pt return call if further questions.

## 2021-05-10 ENCOUNTER — TELEPHONE (OUTPATIENT)
Dept: INTERNAL MEDICINE | Facility: CLINIC | Age: 61
End: 2021-05-10

## 2021-05-10 DIAGNOSIS — Z79.899 MEDICATION MANAGEMENT: Primary | ICD-10-CM

## 2021-05-12 NOTE — PROGRESS NOTES
OFFICE VISIT  NOTE  [facility] CARDIOLOGY      Name: Alejo Condon    Date: 2021  MRN:  [id number]  :  1960      REFERRING/PRIMARY PROVIDER:  Kathryn Bragg APRN    Chief Complaint   Patient presents with   • Acute systolic CHF (congestive heart failure) (CMS/HCC)   • Leg Swelling       HPI: Alejo Condon is a 61 y.o. male who presents today for follow-up for acute systolic CHF.  Patient associate history of hypertension, previous opioid dependence, alcohol abuse, tobacco abuse.  Was told by chiropractor 20 years ago he had an enlarged heart but never followed up with cardiology.  Presented to ER 3/2020 for productive cough, treated for bronchitis, found to have ejection fraction 31% on echo 2020, diastolic dysfunction, biatrial enlargement, appears to be chronic heart failure.  Start on carvedilol, Entresto, and spironolactone by heart failure clinic.  Denies chest pain.  He push mows several yards, without chest pain or shortness of breath.  NYHA II-3 symptoms.  Negative cath 2020.    Has not taken Entresto due to cost.  Reports worsening dyspnea on exertion, NYHA IV symptoms, worsening lower extreme edema, weight gain, abdominal distention.  Stop drinking alcohol 1 month ago, not smoking.  Denies chest pain.    Past Medical History:   Diagnosis Date   • Acute systolic CHF (congestive heart failure) (CMS/HCC) 2020    Echo 20 LVEF = 31.0%. Left ventricular diastolic dysfunction is noted (grade III w/high LAP) consistent with reversible restrictive pattern Left atrial volume is moderately increased.   • Diverticulitis    • Hyperlipidemia    • Hypertension    • Opiate addiction (CMS/HCC)     Resolved since on suboxone       Past Surgical History:   Procedure Laterality Date   • ANAL FISTULA REPAIR N/A    • CARDIAC CATHETERIZATION N/A 2020    Procedure: Left Heart Cath;  Surgeon: Garrett Trejo MD;  Location: [facility] INVASIVE LOCATION;  Service:  "Cardiovascular;  Laterality: N/A;   • FINGER SURGERY         Social History     Socioeconomic History   • Marital status:      Spouse name: Not on file   • Number of children: Not on file   • Years of education: Not on file   • Highest education level: Not on file   Tobacco Use   • Smoking status: Former Smoker     Packs/day: 0.00   • Smokeless tobacco: Never Used   • Tobacco comment: on and off since he was 6 years old   Substance and Sexual Activity   • Alcohol use: Yes     Comment: \"every other day\"   • Drug use: Yes     Types: Marijuana     Comment: quit pills  2018 but still smokes marijuana   • Sexual activity: Defer       Family History   Problem Relation Age of Onset   • Hypertension Mother    • Hyperlipidemia Mother    • Heart failure Mother    • Heart attack Father 88   • Cardiomyopathy Brother 36        Patient reports he had SCD that they attributed to agent orange        ROS:   Constitutional no fever,  no weight loss   Skin no rash, no subcutaneous nodules   Otolaryngeal no difficulty swallowing   Cardiovascular See HPI   Pulmonary no cough, no sputum production   Gastrointestinal no constipation, no diarrhea   Genitourinary no dysuria, no hematuria   Hematologic no easy bruisability, no abnormal bleeding   Musculoskeletal no muscle pain   Neurologic no dizziness, no falls         No Known Allergies      Current Outpatient Medications:   •  albuterol sulfate  (90 Base) MCG/ACT inhaler, Inhale 2 puffs Every 4 (Four) Hours As Needed for Wheezing or Shortness of Air., Disp: 1 inhaler, Rfl: 0  •  carvedilol (COREG) 6.25 MG tablet, Take 1 tablet by mouth 2 (Two) Times a Day With Meals., Disp: 60 tablet, Rfl: 5  •  spironolactone (Aldactone) 50 MG tablet, Take 1 tablet by mouth Daily., Disp: 30 tablet, Rfl: 5  •  losartan (COZAAR) 50 MG tablet, Take 1 tablet by mouth Daily., Disp: 90 tablet, Rfl: 3    Vitals:    05/14/21 0942   BP: 138/90   BP Location: Left arm   Patient Position: Sitting " "  Cuff Size: Adult   Pulse: 84   SpO2: 97%   Weight: 102 kg (225 lb)   Height: 175.3 cm (69\")     Body mass index is 33.23 kg/m².    PHYSICAL EXAM:    General Appearance:   · well developed  · well nourished  HENT:   · oropharynx moist  · lips not cyanotic  Neck:  · thyroid not enlarged  · supple  Respiratory:  · no respiratory distress  · normal breath sounds  · no rales  Cardiovascular:  · no jugular venous distention  · regular rhythm  · apical impulse normal  · S1 normal, S2 normal  · no S3, no S4   · no murmur  · no rub, no thrill  · carotid pulses normal; no bruit  · pedal pulses normal  · lower extremity edema: 2+  Gastrointestinal:   · bowel sounds normal  · non-tender  · no hepatomegaly, no splenomegaly  Musculoskeletal:  · no clubbing of fingers.   · normocephalic, head atraumatic  Skin:   · warm, dry  Psychiatric:  · judgement and insight appropriate  · normal mood and affect    RESULTS:   Procedures    Results for orders placed during the hospital encounter of 05/06/20    Adult Transthoracic Echo Complete W/ Cont if Necessary Per Protocol    Interpretation Summary  · Left ventricular systolic function is moderately decreased. Calculated EF = 31.0%. Estimated EF appears to be in the range of 31 - 35%.  · There is left ventricular global hypokinesis noted. The left ventricular cavity is borderline dilated.  · Left ventricular diastolic dysfunction is noted (grade III w/high LAP) consistent with reversible restrictive pattern  · Left atrial volume is moderately increased.  · Mild tricuspid valve regurgitation is present. Estimated right ventricular systolic pressure from tricuspid regurgitation is normal (<35 mmHg).        Labs:  Lab Results   Component Value Date    CHOL 162 08/25/2020    TRIG 73 08/25/2020    HDL 74 (H) 08/25/2020    LDL 73 08/25/2020    AST 34 04/30/2021    ALT 43 (H) 04/30/2021     Lab Results   Component Value Date    HGBA1C 5.60 08/25/2020     No components found for: " CREATINININE  eGFR Non  Amer   Date Value Ref Range Status   04/30/2021 78 >60 mL/min/1.73 Final   01/24/2021 79 >60 mL/min/1.73 Final   12/26/2020 97 >60 mL/min/1.73 Final         ASSESSMENT:  Problem List Items Addressed This Visit        Cardiac and Vasculature    Hypertension    Relevant Medications    losartan (COZAAR) 50 MG tablet    Hyperlipidemia LDL goal <100    Acute systolic CHF (congestive heart failure) (CMS/HCC) - Primary    Overview     Echo 5/6/20  · LVEF = 31.0%.  · Left ventricular diastolic dysfunction is noted (grade III w/high LAP) consistent with reversible restrictive pattern  · Left atrial volume is moderately increased.    8/25/2020: Normal cardiac catheterization, normal coronaries, LVEDP 5 mmHg.         Relevant Orders    Basic Metabolic Panel       Mental Health    Alcohol abuse       Tobacco    Tobacco abuse        LifeVest interrogation:  Date: 05/14/21  Average daily wear time:  23.84 Hours  Total patient use percent: 99%  Average daily heart rate: 74  Average daily steps: 6446  Average percent upright: 61, Reclined: 20, Flat: 18  Arrhythmia: 0  Alerts: 1        PLAN:    1.  Acute on chronic systolic heart failure, EF 31%:  Normal coronaries 8/25/2020, presumptive etiology will be alcohol and hypertension related  Continue spironolactone 25 mg daily  Continue carvedilol  Stop Entresto due to cost  Replace with losartan 50 mg daily  Lasix 40 mg twice daily for 1 week then down to 40 mg daily  Low-sodium diet  BMP in 2 weeks  Labs reviewed from 4/30/2021  Needs repeat limited echo before next visit to determine ICD necessity.    Discussed importance of continued alcohol and tobacco abstinence    Heart valve follow-up in 2-3 weeks for close monitoring.    2.  Essential hypertension:  Stop Entresto due to cost, start losartan  Continue carvedilol and spironolactone increase as tolerated for blood pressure goal of less than 130/80    3.  Alcohol and tobacco abuse, reports abstinence  for the past month, encourage patient to continue this    Return to clinic in 3 months, or sooner as needed.    High risk for morbidity mortality due to low ejection fraction, acute on chronic systolic heart failure, and medication nonadherence.    Thank you for the opportunity to share in the care of your patient; please do not hesitate to call me with any questions.     Garrett Trejo MD, [facility]  Office: [phone] [address] [place] [address]

## 2021-05-14 ENCOUNTER — OFFICE VISIT (OUTPATIENT)
Dept: CARDIOLOGY | Facility: CLINIC | Age: 61
End: 2021-05-14

## 2021-05-14 ENCOUNTER — OFFICE VISIT (OUTPATIENT)
Dept: INTERNAL MEDICINE | Facility: CLINIC | Age: 61
End: 2021-05-14

## 2021-05-14 VITALS
HEART RATE: 84 BPM | HEIGHT: 69 IN | BODY MASS INDEX: 33.33 KG/M2 | TEMPERATURE: 97.4 F | WEIGHT: 225 LBS | DIASTOLIC BLOOD PRESSURE: 82 MMHG | SYSTOLIC BLOOD PRESSURE: 136 MMHG | OXYGEN SATURATION: 97 %

## 2021-05-14 VITALS
DIASTOLIC BLOOD PRESSURE: 90 MMHG | HEIGHT: 69 IN | OXYGEN SATURATION: 97 % | SYSTOLIC BLOOD PRESSURE: 138 MMHG | HEART RATE: 84 BPM | WEIGHT: 225 LBS | BODY MASS INDEX: 33.33 KG/M2

## 2021-05-14 DIAGNOSIS — R53.83 FATIGUE, UNSPECIFIED TYPE: Primary | ICD-10-CM

## 2021-05-14 DIAGNOSIS — I50.23 ACUTE ON CHRONIC SYSTOLIC CONGESTIVE HEART FAILURE (HCC): ICD-10-CM

## 2021-05-14 DIAGNOSIS — I50.21 ACUTE SYSTOLIC CHF (CONGESTIVE HEART FAILURE) (HCC): Primary | ICD-10-CM

## 2021-05-14 DIAGNOSIS — I10 ESSENTIAL HYPERTENSION: ICD-10-CM

## 2021-05-14 DIAGNOSIS — Z72.0 TOBACCO ABUSE: ICD-10-CM

## 2021-05-14 DIAGNOSIS — F10.10 ALCOHOL ABUSE: ICD-10-CM

## 2021-05-14 DIAGNOSIS — E78.5 HYPERLIPIDEMIA LDL GOAL <100: ICD-10-CM

## 2021-05-14 PROCEDURE — 99215 OFFICE O/P EST HI 40 MIN: CPT | Performed by: INTERNAL MEDICINE

## 2021-05-14 PROCEDURE — 99214 OFFICE O/P EST MOD 30 MIN: CPT | Performed by: NURSE PRACTITIONER

## 2021-05-14 RX ORDER — SPIRONOLACTONE 25 MG/1
25 TABLET ORAL DAILY
Qty: 30 TABLET | Refills: 5 | Status: SHIPPED | OUTPATIENT
Start: 2021-05-14 | End: 2022-06-06 | Stop reason: SDUPTHER

## 2021-05-14 RX ORDER — FUROSEMIDE 40 MG/1
TABLET ORAL
Qty: 90 TABLET | Refills: 3 | Status: SHIPPED | OUTPATIENT
Start: 2021-05-14 | End: 2022-06-06 | Stop reason: SDUPTHER

## 2021-05-14 RX ORDER — LOSARTAN POTASSIUM 50 MG/1
50 TABLET ORAL DAILY
Qty: 90 TABLET | Refills: 3 | Status: SHIPPED | OUTPATIENT
Start: 2021-05-14 | End: 2022-06-06 | Stop reason: SDUPTHER

## 2021-05-15 NOTE — PROGRESS NOTES
Chief Complaint   Patient presents with   • Follow-up     leg swelling and shortness of breath       History of Present Illness    61 y.o.male presents for 2-week follow-up of leg swelling and shortness of breath.  Patient is accompanied by his sister today who helps with HPI.  Patient still has leg swelling and shortness of breath though may not be as bad as it was a couple weeks ago.  Patient was not able to afford the Entresto even with insurance coverage.  He did follow-up with Dr. Trejo cardiology today.  Meds adjusted.  Patient reports he has not had any alcohol or tobacco products for the last 2 weeks.  He is still tired and fatigued often dozes off in conversation.  Not sure if has snoring or sleep apnea.  Does not recall if has had a sleep study.    Review of Systems   Constitutional: Positive for fatigue. Negative for fever.   Respiratory: Positive for shortness of breath.    Cardiovascular: Positive for leg swelling. Negative for chest pain and palpitations.   Genitourinary: Negative for difficulty urinating.           [facility]  The following portions of the patient's history were reviewed and updated as appropriate: allergies, current medications, past family history, past medical history, past social history, past surgical history and problem list.     Past Medical History:   Diagnosis Date   • Acute systolic CHF (congestive heart failure) (CMS/HCC) 5/13/2020    Echo 5/6/20 LVEF = 31.0%. Left ventricular diastolic dysfunction is noted (grade III w/high LAP) consistent with reversible restrictive pattern Left atrial volume is moderately increased.   • Diverticulitis    • Hyperlipidemia    • Hypertension    • Opiate addiction (CMS/HCC)     Resolved since on suboxone      No Known Allergies   Social History     Tobacco Use   • Smoking status: Former Smoker     Packs/day: 0.00     Years: 20.00     Pack years: 0.00     Quit date: 5/13/2021   • Smokeless tobacco: Never Used   • Tobacco comment: on and off  "since he was 6 years old   Substance Use Topics   • Alcohol use: Yes     Comment: \"every other day\"   • Drug use: Yes     Types: Marijuana     Comment: quit pills  2018 but still smokes marijuana         Current Outpatient Medications:   •  albuterol sulfate  (90 Base) MCG/ACT inhaler, Inhale 2 puffs Every 4 (Four) Hours As Needed for Wheezing or Shortness of Air., Disp: 1 inhaler, Rfl: 0  •  carvedilol (COREG) 6.25 MG tablet, Take 1 tablet by mouth 2 (Two) Times a Day With Meals., Disp: 60 tablet, Rfl: 5  •  furosemide (LASIX) 40 MG tablet, Take 1 tablet by mouth twice daily for 1 week. Then decrease to 1 tablet by mouth once daily., Disp: 90 tablet, Rfl: 3  •  losartan (COZAAR) 50 MG tablet, Take 1 tablet by mouth Daily., Disp: 90 tablet, Rfl: 3  •  spironolactone (Aldactone) 25 MG tablet, Take 1 tablet by mouth Daily., Disp: 30 tablet, Rfl: 5    VITALS:  /82   Pulse 84   Temp 97.4 °F (36.3 °C)   Ht 175.3 cm (69\")   Wt 102 kg (225 lb)   SpO2 97%   BMI 33.23 kg/m²     Physical Exam  Vitals reviewed.   Constitutional:       Comments: Alert and talkative, but when not talking directly to pt he nods off to sleep.   HENT:      Head: Normocephalic.   Eyes:      Pupils: Pupils are equal, round, and reactive to light.   Cardiovascular:      Rate and Rhythm: Normal rate and regular rhythm.      Heart sounds: Normal heart sounds.   Pulmonary:      Effort: Pulmonary effort is normal. No respiratory distress.      Breath sounds: No rales.   Musculoskeletal:      Right lower le+ Edema present.      Left lower le+ Edema present.   Skin:     General: Skin is warm and dry.   Neurological:      Mental Status: He is oriented to person, place, and time. Mental status is at baseline.   Psychiatric:         Mood and Affect: Mood normal.         Behavior: Behavior normal.           Assessment and Plan    Diagnoses and all orders for this visit:    1. Fatigue, unspecified type (Primary)  -     Ambulatory " Referral to Sleep Medicine  Need eval for FELIPA  2. Essential hypertension  -     spironolactone (Aldactone) 25 MG tablet; Take 1 tablet by mouth Daily.  Dispense: 30 tablet; Refill: 5    3. Acute on chronic systolic congestive heart failure (CMS/HCC)  -     spironolactone (Aldactone) 25 MG tablet; Take 1 tablet by mouth Daily.  Dispense: 30 tablet; Refill: 5    Reviewed Dr. Trejo's recommendations.  He added Lasix therapy and recommended spironolactone to be at 25 mg daily.  I have updated his Spironolactone prescription.  Patient is going to be starting his losartan today.  He has a follow-up in a couple of weeks with the heart valve clinic and to follow-up with Dr. Trejo in 3 months.  I reviewed the need for low potassium diet.  Patient is aware that he needs follow-up lab in 2 weeks after the medication adjustments.     Continued to encourage patient to staying from alcohol and smoking.      Follow Up   Return in about 3 months (around 8/14/2021), or if symptoms worsen or fail to improve, for Medicare Wellness.      I discussed the patients findings and my recommendations with patient.  Patient was encouraged to keep me informed of any acute changes, lack of improvement, or any new concerning symptoms.  Patient voiced understanding of all instructions and denied further questions.    Electronically signed by:    GAGAN Kerr  05/14/2021    EMR Dragon/Transcription Disclaimer:  Much of this encounter note is an electronic transcription/translation of spoken language to printed text.  The electronic translation of spoken language may permit erroneous, or at times, nonsensical words or phrases to be inadvertently transcribed.  Although I have reviewed the note for such errors, some may still exist

## 2021-07-10 ENCOUNTER — APPOINTMENT (OUTPATIENT)
Dept: GENERAL RADIOLOGY | Facility: HOSPITAL | Age: 61
End: 2021-07-10

## 2021-07-10 ENCOUNTER — HOSPITAL ENCOUNTER (EMERGENCY)
Facility: HOSPITAL | Age: 61
Discharge: HOME | End: 2021-07-10
Attending: EMERGENCY MEDICINE | Admitting: EMERGENCY MEDICINE

## 2021-07-10 VITALS
SYSTOLIC BLOOD PRESSURE: 143 MMHG | HEIGHT: 69 IN | OXYGEN SATURATION: 97 % | TEMPERATURE: 98.3 F | WEIGHT: 195 LBS | DIASTOLIC BLOOD PRESSURE: 81 MMHG | RESPIRATION RATE: 16 BRPM | HEART RATE: 88 BPM | BODY MASS INDEX: 28.88 KG/M2

## 2021-07-10 DIAGNOSIS — T40.604A OPIATE OVERDOSE, UNDETERMINED INTENT, INITIAL ENCOUNTER (HCC): Primary | ICD-10-CM

## 2021-07-10 DIAGNOSIS — J18.9 PNEUMONIA OF RIGHT LOWER LOBE DUE TO INFECTIOUS ORGANISM: ICD-10-CM

## 2021-07-10 DIAGNOSIS — J40 BRONCHITIS: ICD-10-CM

## 2021-07-10 LAB
ALBUMIN SERPL-MCNC: 3.9 G/DL (ref 3.5–5.2)
ALBUMIN/GLOB SERPL: 1.5 G/DL
ALP SERPL-CCNC: 62 U/L (ref 39–117)
ALT SERPL W P-5'-P-CCNC: 34 U/L (ref 1–41)
AMPHET+METHAMPHET UR QL: NEGATIVE
AMPHETAMINES UR QL: NEGATIVE
ANION GAP SERPL CALCULATED.3IONS-SCNC: 10.1 MMOL/L (ref 5–15)
AST SERPL-CCNC: 25 U/L (ref 1–40)
BARBITURATES UR QL SCN: NEGATIVE
BASOPHILS # BLD AUTO: 0.03 10*3/MM3 (ref 0–0.2)
BASOPHILS NFR BLD AUTO: 0.3 % (ref 0–1.5)
BENZODIAZ UR QL SCN: POSITIVE
BILIRUB SERPL-MCNC: 0.4 MG/DL (ref 0–1.2)
BILIRUB UR QL STRIP: NEGATIVE
BUN SERPL-MCNC: 18 MG/DL (ref 8–23)
BUN/CREAT SERPL: 19.1 (ref 7–25)
BUPRENORPHINE SERPL-MCNC: NEGATIVE NG/ML
CALCIUM SPEC-SCNC: 9 MG/DL (ref 8.6–10.5)
CANNABINOIDS SERPL QL: POSITIVE
CHLORIDE SERPL-SCNC: 99 MMOL/L (ref 98–107)
CK MB SERPL-CCNC: 4.46 NG/ML
CK SERPL-CCNC: 62 U/L (ref 20–200)
CLARITY UR: CLEAR
CO2 SERPL-SCNC: 25.9 MMOL/L (ref 22–29)
COCAINE UR QL: NEGATIVE
COLOR UR: YELLOW
CREAT SERPL-MCNC: 0.94 MG/DL (ref 0.76–1.27)
DEPRECATED RDW RBC AUTO: 44.6 FL (ref 37–54)
EOSINOPHIL # BLD AUTO: 0.03 10*3/MM3 (ref 0–0.4)
EOSINOPHIL NFR BLD AUTO: 0.3 % (ref 0.3–6.2)
ERYTHROCYTE [DISTWIDTH] IN BLOOD BY AUTOMATED COUNT: 14.6 % (ref 12.3–15.4)
ETHANOL BLD-MCNC: <10 MG/DL (ref 0–10)
ETHANOL UR QL: <0.01 %
GFR SERPL CREATININE-BSD FRML MDRD: 81 ML/MIN/1.73
GLOBULIN UR ELPH-MCNC: 2.6 GM/DL
GLUCOSE SERPL-MCNC: 212 MG/DL (ref 65–99)
GLUCOSE UR STRIP-MCNC: ABNORMAL MG/DL
HCT VFR BLD AUTO: 45.2 % (ref 37.5–51)
HGB BLD-MCNC: 15.2 G/DL (ref 13–17.7)
HGB UR QL STRIP.AUTO: NEGATIVE
HOLD SPECIMEN: NORMAL
IMM GRANULOCYTES # BLD AUTO: 0.1 10*3/MM3 (ref 0–0.05)
IMM GRANULOCYTES NFR BLD AUTO: 1 % (ref 0–0.5)
KETONES UR QL STRIP: NEGATIVE
LEUKOCYTE ESTERASE UR QL STRIP.AUTO: NEGATIVE
LYMPHOCYTES # BLD AUTO: 1.75 10*3/MM3 (ref 0.7–3.1)
LYMPHOCYTES NFR BLD AUTO: 18.3 % (ref 19.6–45.3)
MCH RBC QN AUTO: 28.5 PG (ref 26.6–33)
MCHC RBC AUTO-ENTMCNC: 33.6 G/DL (ref 31.5–35.7)
MCV RBC AUTO: 84.8 FL (ref 79–97)
METHADONE UR QL SCN: NEGATIVE
MONOCYTES # BLD AUTO: 0.51 10*3/MM3 (ref 0.1–0.9)
MONOCYTES NFR BLD AUTO: 5.3 % (ref 5–12)
NEUTROPHILS NFR BLD AUTO: 7.14 10*3/MM3 (ref 1.7–7)
NEUTROPHILS NFR BLD AUTO: 74.8 % (ref 42.7–76)
NITRITE UR QL STRIP: NEGATIVE
NRBC BLD AUTO-RTO: 0 /100 WBC (ref 0–0.2)
OPIATES UR QL: NEGATIVE
OXYCODONE UR QL SCN: NEGATIVE
PCP UR QL SCN: NEGATIVE
PH UR STRIP.AUTO: 5.5 [PH] (ref 5–8)
PLATELET # BLD AUTO: 209 10*3/MM3 (ref 140–450)
PMV BLD AUTO: 10.2 FL (ref 6–12)
POTASSIUM SERPL-SCNC: 4.8 MMOL/L (ref 3.5–5.2)
PROPOXYPH UR QL: NEGATIVE
PROT SERPL-MCNC: 6.5 G/DL (ref 6–8.5)
PROT UR QL STRIP: ABNORMAL
RBC # BLD AUTO: 5.33 10*6/MM3 (ref 4.14–5.8)
SODIUM SERPL-SCNC: 135 MMOL/L (ref 136–145)
SP GR UR STRIP: 1.03 (ref 1–1.03)
TRICYCLICS UR QL SCN: NEGATIVE
TROPONIN T SERPL-MCNC: <0.01 NG/ML (ref 0–0.03)
UROBILINOGEN UR QL STRIP: ABNORMAL
WBC # BLD AUTO: 9.56 10*3/MM3 (ref 3.4–10.8)
WHOLE BLOOD HOLD SPECIMEN: NORMAL

## 2021-07-10 PROCEDURE — 93005 ELECTROCARDIOGRAM TRACING: CPT

## 2021-07-10 PROCEDURE — 96360 HYDRATION IV INFUSION INIT: CPT

## 2021-07-10 PROCEDURE — 99284 EMERGENCY DEPT VISIT MOD MDM: CPT

## 2021-07-10 PROCEDURE — 80053 COMPREHEN METABOLIC PANEL: CPT | Performed by: PHYSICIAN ASSISTANT

## 2021-07-10 PROCEDURE — 84484 ASSAY OF TROPONIN QUANT: CPT | Performed by: PHYSICIAN ASSISTANT

## 2021-07-10 PROCEDURE — 85025 COMPLETE CBC W/AUTO DIFF WBC: CPT | Performed by: PHYSICIAN ASSISTANT

## 2021-07-10 PROCEDURE — 82553 CREATINE MB FRACTION: CPT | Performed by: PHYSICIAN ASSISTANT

## 2021-07-10 PROCEDURE — 71045 X-RAY EXAM CHEST 1 VIEW: CPT

## 2021-07-10 PROCEDURE — 81003 URINALYSIS AUTO W/O SCOPE: CPT | Performed by: PHYSICIAN ASSISTANT

## 2021-07-10 PROCEDURE — 82077 ASSAY SPEC XCP UR&BREATH IA: CPT | Performed by: PHYSICIAN ASSISTANT

## 2021-07-10 PROCEDURE — 82550 ASSAY OF CK (CPK): CPT | Performed by: PHYSICIAN ASSISTANT

## 2021-07-10 PROCEDURE — 80306 DRUG TEST PRSMV INSTRMNT: CPT | Performed by: PHYSICIAN ASSISTANT

## 2021-07-10 RX ORDER — DOXYCYCLINE 100 MG/1
100 CAPSULE ORAL 2 TIMES DAILY
Qty: 14 CAPSULE | Refills: 0 | Status: SHIPPED | OUTPATIENT
Start: 2021-07-10 | End: 2021-07-16

## 2021-07-10 RX ORDER — SODIUM CHLORIDE 0.9 % (FLUSH) 0.9 %
10 SYRINGE (ML) INJECTION AS NEEDED
Status: DISCONTINUED | OUTPATIENT
Start: 2021-07-10 | End: 2021-07-10 | Stop reason: HOSPADM

## 2021-07-10 RX ORDER — DOXYCYCLINE 100 MG/1
100 CAPSULE ORAL ONCE
Status: COMPLETED | OUTPATIENT
Start: 2021-07-10 | End: 2021-07-10

## 2021-07-10 RX ADMIN — DOXYCYCLINE 100 MG: 100 CAPSULE ORAL at 19:24

## 2021-07-10 RX ADMIN — SODIUM CHLORIDE 1000 ML: 9 INJECTION, SOLUTION INTRAVENOUS at 16:44

## 2021-07-10 NOTE — ED PROVIDER NOTES
Subjective   61-year-old male presents as an overdose, was reported that he had sniffed a white substance, was unresponsive, received CPR, and Narcan and became more responsive.  The patient is uncooperative, he states he does not remember what happened, he states he sniffed a white substance but he does not know where it came from, he will not elaborate on the incident.      History provided by:  EMS personnel   used: No        Review of Systems   Psychiatric/Behavioral:        Drug overdose   All other systems reviewed and are negative.      Past Medical History:   Diagnosis Date   • Acute systolic CHF (congestive heart failure) (CMS/HCC) 5/13/2020    Echo 5/6/20 LVEF = 31.0%. Left ventricular diastolic dysfunction is noted (grade III w/high LAP) consistent with reversible restrictive pattern Left atrial volume is moderately increased.   • Diverticulitis    • Hyperlipidemia    • Hypertension    • Opiate addiction (CMS/HCC)     Resolved since on suboxone       No Known Allergies    Past Surgical History:   Procedure Laterality Date   • ANAL FISTULA REPAIR N/A    • CARDIAC CATHETERIZATION N/A 8/25/2020    Procedure: Left Heart Cath;  Surgeon: Garrett Trejo MD;  Location: [facility] CATH INVASIVE LOCATION;  Service: Cardiovascular;  Laterality: N/A;   • FINGER SURGERY     • RETINAL DETACHMENT REPAIR  07/2021       Family History   Problem Relation Age of Onset   • Hypertension Mother    • Hyperlipidemia Mother    • Heart failure Mother    • Heart attack Father 88   • Cardiomyopathy Brother 36        Patient reports he had SCD that they attributed to agent orange       Social History     Socioeconomic History   • Marital status:      Spouse name: Not on file   • Number of children: Not on file   • Years of education: Not on file   • Highest education level: Not on file   Tobacco Use   • Smoking status: Former Smoker     Packs/day: 0.00     Years: 20.00     Pack years: 0.00     Quit date:  "2021     Years since quittin.1   • Smokeless tobacco: Never Used   • Tobacco comment: on and off since he was 6 years old   Substance and Sexual Activity   • Alcohol use: Yes     Comment: \"every other day\"   • Drug use: Yes     Types: Marijuana     Comment: quit pills   but still smokes marijuana   • Sexual activity: Defer           Objective   Physical Exam  Vitals and nursing note reviewed.   Constitutional:       Appearance: He is well-developed.      Comments: Disheveled appearance   HENT:      Head: Normocephalic and atraumatic.   Cardiovascular:      Rate and Rhythm: Normal rate and regular rhythm.   Pulmonary:      Effort: Pulmonary effort is normal.      Breath sounds: Normal breath sounds.   Musculoskeletal:         General: Normal range of motion.      Cervical back: Normal range of motion.   Skin:     General: Skin is warm and dry.   Psychiatric:      Comments: Poor eye contact poor judgment, uncooperative         Procedures           ED Course  ED Course as of Jul 10 1916   Sat Jul 10, 2021   1653 EKG interpreted by me reveals sinus rhythm with a rate of 98 bpm.  There are nonspecific T wave changes.  These are similar to previous EKGs.  This is an abnormal appearing EKG.    [TB]      ED Course User Index  [TB] Milli Morin MD                                           MDM  Number of Diagnoses or Management Options  Bronchitis: new and requires workup  Opiate overdose, undetermined intent, initial encounter (CMS/[facility]): new and requires workup  Pneumonia of right lower lobe due to infectious organism: new and requires workup     Amount and/or Complexity of Data Reviewed  Clinical lab tests: reviewed  Tests in the radiology section of CPT®: reviewed    Risk of Complications, Morbidity, and/or Mortality  Presenting problems: minimal  Diagnostic procedures: minimal  Management options: minimal    Patient Progress  Patient progress: stable      Final diagnoses:   Opiate overdose, " undetermined intent, initial encounter (CMS/[facility])   Bronchitis   Pneumonia of right lower lobe due to infectious organism       ED Disposition  ED Disposition     ED Disposition Condition Comment    Discharge Stable           Kathryn Bragg, APRN  [address] [id number] [phone]    Schedule an appointment as soon as possible for a visit       [facility]  [address] [address] [phone]    If symptoms worsen         Medication List      New Prescriptions    doxycycline 100 MG capsule  Commonly known as: MONODOX  Take 1 capsule by mouth 2 (Two) Times a Day for 7 days.           Where to Get Your Medications      These medications were sent to [facility]/pharmacy #[id number] - [place], [place] - [address] - [phone]  - [phone]   [address] [address]    Phone: [phone]   · doxycycline 100 MG capsule          Kirk Braun Jr., PA-C  07/10/21 6526

## 2021-07-16 ENCOUNTER — APPOINTMENT (OUTPATIENT)
Dept: GENERAL RADIOLOGY | Facility: HOSPITAL | Age: 61
End: 2021-07-16

## 2021-07-16 ENCOUNTER — HOSPITAL ENCOUNTER (OUTPATIENT)
Dept: GENERAL RADIOLOGY | Facility: HOSPITAL | Age: 61
Discharge: HOME | End: 2021-07-16
Admitting: NURSE PRACTITIONER

## 2021-07-16 ENCOUNTER — OFFICE VISIT (OUTPATIENT)
Dept: INTERNAL MEDICINE | Facility: CLINIC | Age: 61
End: 2021-07-16

## 2021-07-16 VITALS
OXYGEN SATURATION: 97 % | SYSTOLIC BLOOD PRESSURE: 132 MMHG | HEART RATE: 97 BPM | WEIGHT: 202 LBS | DIASTOLIC BLOOD PRESSURE: 78 MMHG | HEIGHT: 69 IN | TEMPERATURE: 97.9 F | RESPIRATION RATE: 18 BRPM | BODY MASS INDEX: 29.92 KG/M2

## 2021-07-16 DIAGNOSIS — I10 ESSENTIAL HYPERTENSION: ICD-10-CM

## 2021-07-16 DIAGNOSIS — J40 BRONCHITIS: Primary | ICD-10-CM

## 2021-07-16 DIAGNOSIS — J18.9 PNEUMONIA OF RIGHT LOWER LOBE DUE TO INFECTIOUS ORGANISM: ICD-10-CM

## 2021-07-16 DIAGNOSIS — J40 BRONCHITIS: ICD-10-CM

## 2021-07-16 PROCEDURE — 99213 OFFICE O/P EST LOW 20 MIN: CPT | Performed by: NURSE PRACTITIONER

## 2021-07-16 PROCEDURE — 71046 X-RAY EXAM CHEST 2 VIEWS: CPT

## 2021-07-16 RX ORDER — POLYMYXIN B SULFATE AND TRIMETHOPRIM 1; 10000 MG/ML; [USP'U]/ML
SOLUTION OPHTHALMIC
COMMUNITY
Start: 2021-07-01 | End: 2022-08-18

## 2021-07-16 RX ORDER — HYDROCODONE BITARTRATE AND ACETAMINOPHEN 5; 325 MG/1; MG/1
TABLET ORAL
COMMUNITY
Start: 2021-05-25 | End: 2021-07-16

## 2021-07-16 RX ORDER — ALBUTEROL SULFATE 90 UG/1
2 AEROSOL, METERED RESPIRATORY (INHALATION) EVERY 4 HOURS PRN
Qty: 6.7 G | Refills: 0 | Status: SHIPPED | OUTPATIENT
Start: 2021-07-16 | End: 2022-06-06 | Stop reason: SDUPTHER

## 2021-07-16 RX ORDER — PREDNISONE 10 MG/1
TABLET ORAL
Qty: 48 TABLET | Refills: 0 | Status: SHIPPED | OUTPATIENT
Start: 2021-07-16 | End: 2022-06-06

## 2021-07-16 RX ORDER — ONDANSETRON 8 MG/1
TABLET, ORALLY DISINTEGRATING ORAL
COMMUNITY
Start: 2021-07-02 | End: 2022-06-06 | Stop reason: SDUPTHER

## 2021-07-16 RX ORDER — IBUPROFEN 600 MG/1
600 TABLET ORAL
COMMUNITY
Start: 2021-05-25 | End: 2022-06-06 | Stop reason: SDUPTHER

## 2021-07-16 RX ORDER — ALBUTEROL SULFATE 90 UG/1
2 AEROSOL, METERED RESPIRATORY (INHALATION) EVERY 4 HOURS PRN
Qty: 6.7 G | Refills: 0 | Status: SHIPPED | OUTPATIENT
Start: 2021-07-16 | End: 2021-07-16

## 2021-07-16 RX ORDER — OXYCODONE HYDROCHLORIDE 10 MG/1
TABLET ORAL
COMMUNITY
Start: 2021-07-02 | End: 2021-07-16

## 2021-07-16 RX ORDER — ADHESIVE BANDAGE 3/4"
1 BANDAGE TOPICAL AS NEEDED
Qty: 1 EACH | Refills: 0 | Status: SHIPPED | OUTPATIENT
Start: 2021-07-16

## 2021-08-02 DIAGNOSIS — J40 BRONCHITIS: ICD-10-CM

## 2021-08-02 DIAGNOSIS — I10 ESSENTIAL HYPERTENSION: ICD-10-CM

## 2021-08-02 DIAGNOSIS — I50.23 ACUTE ON CHRONIC SYSTOLIC CONGESTIVE HEART FAILURE (HCC): ICD-10-CM

## 2021-08-02 DIAGNOSIS — J18.9 PNEUMONIA OF RIGHT LOWER LOBE DUE TO INFECTIOUS ORGANISM: ICD-10-CM

## 2021-08-02 RX ORDER — ALBUTEROL SULFATE 90 UG/1
2 AEROSOL, METERED RESPIRATORY (INHALATION) EVERY 4 HOURS PRN
Qty: 6.7 G | Refills: 0 | OUTPATIENT
Start: 2021-08-02

## 2021-08-02 RX ORDER — LOSARTAN POTASSIUM 50 MG/1
50 TABLET ORAL DAILY
Qty: 90 TABLET | Refills: 3 | OUTPATIENT
Start: 2021-08-02

## 2021-08-02 RX ORDER — SPIRONOLACTONE 25 MG/1
25 TABLET ORAL DAILY
Qty: 30 TABLET | Refills: 5 | OUTPATIENT
Start: 2021-08-02

## 2021-08-02 RX ORDER — PREDNISONE 10 MG/1
TABLET ORAL
Qty: 48 TABLET | Refills: 0 | OUTPATIENT
Start: 2021-08-02

## 2021-08-02 RX ORDER — ONDANSETRON 8 MG/1
TABLET, ORALLY DISINTEGRATING ORAL
OUTPATIENT
Start: 2021-08-02

## 2021-08-02 NOTE — TELEPHONE ENCOUNTER
Caller: Alejo Condon    Relationship: Self    Best call back number: [phone]    Medication needed:   Requested Prescriptions     Pending Prescriptions Disp Refills   • losartan (COZAAR) 50 MG tablet 90 tablet 3     Sig: Take 1 tablet by mouth Daily.   • predniSONE (DELTASONE) 10 MG (48) dose pack 48 tablet 0     Sig: Take as directed on package.   • albuterol sulfate  (90 Base) MCG/ACT inhaler 6.7 g 0     Sig: Inhale 2 puffs Every 4 (Four) Hours As Needed for Wheezing or Shortness of Air.   • spironolactone (Aldactone) 25 MG tablet 30 tablet 5     Sig: Take 1 tablet by mouth Daily.   • ondansetron ODT (ZOFRAN-ODT) 8 MG disintegrating tablet         When do you need the refill by:     What additional details did the patient provide when requesting the medication: PATIENT STATED THAT HE WOULD NEED REFILLS FOR ALL THESE MEDICATIONS AND HAS APPOINTMENT WITH [facility] TODAY    PLEASE ADVISE  Does the patient have less than a 3 day supply:  [x] Yes  [] No    What is the patient's preferred pharmacy: [facility]/PHARMACY #[id number] [place], [place] - [address] [phone] [facility] [phone]

## 2021-08-20 ENCOUNTER — HOSPITAL ENCOUNTER (OUTPATIENT)
Dept: CARDIOLOGY | Facility: HOSPITAL | Age: 61
End: 2021-08-20

## 2021-08-25 ENCOUNTER — HOSPITAL ENCOUNTER (OUTPATIENT)
Dept: CARDIOLOGY | Facility: HOSPITAL | Age: 61
Discharge: HOME | End: 2021-08-25
Admitting: INTERNAL MEDICINE

## 2021-08-25 DIAGNOSIS — I50.21 ACUTE SYSTOLIC CHF (CONGESTIVE HEART FAILURE) (HCC): ICD-10-CM

## 2021-08-25 LAB
BH CV ECHO MEAS - AO ROOT AREA (BSA CORRECTED): 1.8
BH CV ECHO MEAS - AO ROOT AREA: 11.9 CM^2
BH CV ECHO MEAS - AO ROOT DIAM: 3.9 CM
BH CV ECHO MEAS - ASC AORTA: 3.3 CM
BH CV ECHO MEAS - BSA(HAYCOCK): 2.2 M^2
BH CV ECHO MEAS - BSA: 2.1 M^2
BH CV ECHO MEAS - BZI_BMI: 31 KILOGRAMS/M^2
BH CV ECHO MEAS - BZI_METRIC_HEIGHT: 175.3 CM
BH CV ECHO MEAS - BZI_METRIC_WEIGHT: 95.3 KG
BH CV ECHO MEAS - EDV(CUBED): 214.9 ML
BH CV ECHO MEAS - EDV(MOD-SP2): 128 ML
BH CV ECHO MEAS - EDV(MOD-SP4): 194 ML
BH CV ECHO MEAS - EDV(TEICH): 179.3 ML
BH CV ECHO MEAS - EF(CUBED): 44.2 %
BH CV ECHO MEAS - EF(MOD-BP): 36 %
BH CV ECHO MEAS - EF(MOD-SP2): 29.7 %
BH CV ECHO MEAS - EF(MOD-SP4): 41.2 %
BH CV ECHO MEAS - EF(TEICH): 36.2 %
BH CV ECHO MEAS - ESV(CUBED): 119.8 ML
BH CV ECHO MEAS - ESV(MOD-SP2): 90 ML
BH CV ECHO MEAS - ESV(MOD-SP4): 114 ML
BH CV ECHO MEAS - ESV(TEICH): 114.4 ML
BH CV ECHO MEAS - FS: 17.7 %
BH CV ECHO MEAS - IVS/LVPW: 0.91
BH CV ECHO MEAS - IVSD: 0.93 CM
BH CV ECHO MEAS - LA DIMENSION: 4.6 CM
BH CV ECHO MEAS - LA/AO: 1.2
BH CV ECHO MEAS - LV DIASTOLIC VOL/BSA (35-75): 92 ML/M^2
BH CV ECHO MEAS - LV MASS(C)D: 237.8 GRAMS
BH CV ECHO MEAS - LV MASS(C)DI: 112.8 GRAMS/M^2
BH CV ECHO MEAS - LV SYSTOLIC VOL/BSA (12-30): 54.1 ML/M^2
BH CV ECHO MEAS - LVIDD: 6 CM
BH CV ECHO MEAS - LVIDS: 4.9 CM
BH CV ECHO MEAS - LVLD AP2: 8.7 CM
BH CV ECHO MEAS - LVLD AP4: 9.3 CM
BH CV ECHO MEAS - LVLS AP2: 7.9 CM
BH CV ECHO MEAS - LVLS AP4: 8.4 CM
BH CV ECHO MEAS - LVOT AREA (M): 4.2 CM^2
BH CV ECHO MEAS - LVOT AREA: 4.2 CM^2
BH CV ECHO MEAS - LVOT DIAM: 2.3 CM
BH CV ECHO MEAS - LVPWD: 1 CM
BH CV ECHO MEAS - SI(CUBED): 45.1 ML/M^2
BH CV ECHO MEAS - SI(MOD-SP2): 18 ML/M^2
BH CV ECHO MEAS - SI(MOD-SP4): 37.9 ML/M^2
BH CV ECHO MEAS - SI(TEICH): 30.8 ML/M^2
BH CV ECHO MEAS - SV(CUBED): 95.1 ML
BH CV ECHO MEAS - SV(MOD-SP2): 38 ML
BH CV ECHO MEAS - SV(MOD-SP4): 80 ML
BH CV ECHO MEAS - SV(TEICH): 64.9 ML

## 2021-08-25 PROCEDURE — 93308 TTE F-UP OR LMTD: CPT

## 2021-08-25 PROCEDURE — 93325 DOPPLER ECHO COLOR FLOW MAPG: CPT | Performed by: INTERNAL MEDICINE

## 2021-08-25 PROCEDURE — 93325 DOPPLER ECHO COLOR FLOW MAPG: CPT

## 2021-08-25 PROCEDURE — 93321 DOPPLER ECHO F-UP/LMTD STD: CPT

## 2021-08-25 PROCEDURE — 93308 TTE F-UP OR LMTD: CPT | Performed by: INTERNAL MEDICINE

## 2021-08-25 PROCEDURE — 93321 DOPPLER ECHO F-UP/LMTD STD: CPT | Performed by: INTERNAL MEDICINE

## 2021-08-25 NOTE — PROGRESS NOTES
Please inform the patient of their test results.  No need for ICD at this time, continue medical therapy.. Thank you.

## 2021-08-26 ENCOUNTER — TELEPHONE (OUTPATIENT)
Dept: CARDIOLOGY | Facility: CLINIC | Age: 61
End: 2021-08-26

## 2021-08-26 NOTE — TELEPHONE ENCOUNTER
----- Message from Garrett Trejo MD sent at 8/25/2021  3:06 PM EDT -----  Please inform the patient of their test results.  No need for ICD at this time, continue medical therapy.. Thank you.

## 2022-05-07 DIAGNOSIS — I50.9 ACUTE ON CHRONIC CONGESTIVE HEART FAILURE, UNSPECIFIED HEART FAILURE TYPE: ICD-10-CM

## 2022-05-07 DIAGNOSIS — I10 ESSENTIAL HYPERTENSION: ICD-10-CM

## 2022-05-08 RX ORDER — CARVEDILOL 6.25 MG/1
TABLET ORAL
Qty: 180 TABLET | Refills: 1 | OUTPATIENT
Start: 2022-05-08

## 2022-05-31 DIAGNOSIS — I50.9 ACUTE ON CHRONIC CONGESTIVE HEART FAILURE, UNSPECIFIED HEART FAILURE TYPE: ICD-10-CM

## 2022-05-31 DIAGNOSIS — I10 ESSENTIAL HYPERTENSION: ICD-10-CM

## 2022-05-31 RX ORDER — LOSARTAN POTASSIUM 50 MG/1
50 TABLET ORAL DAILY
Qty: 90 TABLET | Refills: 3 | OUTPATIENT
Start: 2022-05-31

## 2022-05-31 RX ORDER — CARVEDILOL 6.25 MG/1
6.25 TABLET ORAL 2 TIMES DAILY WITH MEALS
Qty: 60 TABLET | Refills: 5 | OUTPATIENT
Start: 2022-05-31

## 2022-05-31 RX ORDER — CARVEDILOL 6.25 MG/1
TABLET ORAL
Qty: 180 TABLET | Refills: 1 | OUTPATIENT
Start: 2022-05-31

## 2022-06-01 NOTE — TELEPHONE ENCOUNTER
PATIENT IS HAS BEEN OUT OF HIS  POTASSIUM 3 TO 4 DAYS AND WILL BE OUT OF HIS HEART MEDICATION THIS Saturday.      PATIENT IS SCHEDULED FOR FOLLOW  UP WITH [facility] 6/6 @ 415. REQUESTING TO BE SEEN SOONER IF POSSIBLE.    REQUESTS PARTIAL UNTIL PATIENTS APPOINTMENT    CALL BACK:  [phone]      PHARMACY:    [facility]/pharmacy #[id number] - [place], [place] - [address] - [phone] [facility] [phone]   [phone]

## 2022-06-06 ENCOUNTER — OFFICE VISIT (OUTPATIENT)
Dept: INTERNAL MEDICINE | Facility: CLINIC | Age: 62
End: 2022-06-06

## 2022-06-06 VITALS
BODY MASS INDEX: 31.4 KG/M2 | WEIGHT: 212 LBS | HEIGHT: 69 IN | DIASTOLIC BLOOD PRESSURE: 80 MMHG | HEART RATE: 74 BPM | RESPIRATION RATE: 16 BRPM | TEMPERATURE: 98.5 F | OXYGEN SATURATION: 95 % | SYSTOLIC BLOOD PRESSURE: 124 MMHG

## 2022-06-06 DIAGNOSIS — I50.22 CHRONIC SYSTOLIC CONGESTIVE HEART FAILURE: ICD-10-CM

## 2022-06-06 DIAGNOSIS — M25.50 ARTHRALGIA, UNSPECIFIED JOINT: ICD-10-CM

## 2022-06-06 DIAGNOSIS — R11.0 NAUSEA: ICD-10-CM

## 2022-06-06 DIAGNOSIS — J40 BRONCHITIS: ICD-10-CM

## 2022-06-06 DIAGNOSIS — R73.03 PREDIABETES: ICD-10-CM

## 2022-06-06 DIAGNOSIS — I10 ESSENTIAL HYPERTENSION: Primary | ICD-10-CM

## 2022-06-06 PROCEDURE — 99214 OFFICE O/P EST MOD 30 MIN: CPT | Performed by: NURSE PRACTITIONER

## 2022-06-06 RX ORDER — FUROSEMIDE 40 MG/1
TABLET ORAL
Qty: 90 TABLET | Refills: 1 | Status: SHIPPED | OUTPATIENT
Start: 2022-06-06 | End: 2022-12-20 | Stop reason: SDUPTHER

## 2022-06-06 RX ORDER — ALBUTEROL SULFATE 90 UG/1
2 AEROSOL, METERED RESPIRATORY (INHALATION) EVERY 4 HOURS PRN
Qty: 6.7 G | Refills: 0 | Status: SHIPPED | OUTPATIENT
Start: 2022-06-06

## 2022-06-06 RX ORDER — IBUPROFEN 600 MG/1
600 TABLET ORAL EVERY 8 HOURS PRN
Qty: 30 TABLET | Refills: 2 | Status: SHIPPED | OUTPATIENT
Start: 2022-06-06 | End: 2022-12-20

## 2022-06-06 RX ORDER — SERTRALINE HYDROCHLORIDE 100 MG/1
100 TABLET, FILM COATED ORAL DAILY
COMMUNITY
Start: 2022-05-19 | End: 2022-12-20 | Stop reason: SDUPTHER

## 2022-06-06 RX ORDER — SPIRONOLACTONE 25 MG/1
25 TABLET ORAL DAILY
Qty: 90 TABLET | Refills: 1 | Status: SHIPPED | OUTPATIENT
Start: 2022-06-06 | End: 2022-12-19 | Stop reason: SDUPTHER

## 2022-06-06 RX ORDER — CARVEDILOL 6.25 MG/1
6.25 TABLET ORAL 2 TIMES DAILY WITH MEALS
Qty: 180 TABLET | Refills: 1 | Status: SHIPPED | OUTPATIENT
Start: 2022-06-06 | End: 2022-12-19 | Stop reason: SDUPTHER

## 2022-06-06 RX ORDER — HYDROXYZINE 50 MG/1
TABLET, FILM COATED ORAL EVERY 4 HOURS PRN
COMMUNITY
Start: 2022-05-19 | End: 2022-12-20 | Stop reason: SDUPTHER

## 2022-06-06 RX ORDER — POTASSIUM CHLORIDE 20 MEQ/1
20 TABLET, EXTENDED RELEASE ORAL DAILY
Qty: 90 TABLET | Refills: 1 | Status: SHIPPED | OUTPATIENT
Start: 2022-06-06

## 2022-06-06 RX ORDER — TRAZODONE HYDROCHLORIDE 50 MG/1
TABLET ORAL NIGHTLY
COMMUNITY
Start: 2022-05-26 | End: 2022-12-20 | Stop reason: SDUPTHER

## 2022-06-06 RX ORDER — ONDANSETRON 8 MG/1
8 TABLET, ORALLY DISINTEGRATING ORAL EVERY 8 HOURS PRN
Qty: 60 TABLET | Refills: 5 | Status: SHIPPED | OUTPATIENT
Start: 2022-06-06

## 2022-06-06 RX ORDER — LOSARTAN POTASSIUM 50 MG/1
50 TABLET ORAL DAILY
Qty: 90 TABLET | Refills: 1 | Status: SHIPPED | OUTPATIENT
Start: 2022-06-06 | End: 2022-11-07

## 2022-06-06 NOTE — PROGRESS NOTES
Chief Complaint   Patient presents with   • Hypertension   • Fatigue     Follow up   • Med Refill       History of Present Illness    62 y.o.male presents for med refill chronic med conditions.  Has been doing better; has cut back on drinking alcohol.  Smoking about a pack a week. 12 pack beer a week.   Has had recent Right eye problem. Had to have glass pupil placed. Retina detachment. Has some right eye droop afterwards.  htn chronic has been out of meds few days. On losartan, coreg, lasix, aldactone.  Chronic heart failure. Couldn't get refill with cardiology needed to get appt. No short of breath; occasional lower ext and hand edema.   Can't remember the dose of potassium that he takes.  Would like to get refill on inhaler; uses occasionally for intermittent cough.  Occasionally nausea; likes to have zofran on hand if needed.  Generalized arthralgia hands feet.    Review of Systems   Constitutional: Negative for chills, fatigue, fever, unexpected weight gain and unexpected weight loss.   Eyes: Positive for blurred vision and visual disturbance.   Respiratory: Negative for cough and shortness of breath.    Cardiovascular: Positive for leg swelling. Negative for chest pain and palpitations.   Genitourinary: Negative for difficulty urinating.   Neurological: Negative for dizziness, syncope, light-headedness and headache.         [facility]  The following portions of the patient's history were reviewed and updated as appropriate: allergies, current medications, past family history, past medical history, past social history, past surgical history and problem list.     Past Medical History:   Diagnosis Date   • Acute systolic CHF (congestive heart failure) (HCC) 5/13/2020    Echo 5/6/20 LVEF = 31.0%. Left ventricular diastolic dysfunction is noted (grade III w/high LAP) consistent with reversible restrictive pattern Left atrial volume is moderately increased.   • Diverticulitis    • Hyperlipidemia    • Hypertension    •  "Opiate addiction (HCC)     Resolved since on suboxone      No Known Allergies   Social History     Tobacco Use   • Smoking status: Former Smoker     Packs/day: 0.00     Years: 20.00     Pack years: 0.00     Quit date: 2021     Years since quittin.0   • Smokeless tobacco: Never Used   • Tobacco comment: on and off since he was 6 years old   Substance Use Topics   • Alcohol use: Yes     Comment: \"every other day\"   • Drug use: Yes     Types: Marijuana     Comment: quit pills   but still smokes marijuana     Past Surgical History:   Procedure Laterality Date   • ANAL FISTULA REPAIR N/A    • CARDIAC CATHETERIZATION N/A 2020    Procedure: Left Heart Cath;  Surgeon: Garrett Trejo MD;  Location: [facility] CATH INVASIVE LOCATION;  Service: Cardiovascular;  Laterality: N/A;   • FINGER SURGERY     • RETINAL DETACHMENT REPAIR  2021      Family History   Problem Relation Age of Onset   • Hypertension Mother    • Hyperlipidemia Mother    • Heart failure Mother    • Heart attack Father 88   • Cardiomyopathy Brother 36        Patient reports he had SCD that they attributed to agent orange           Current Outpatient Medications:   •  albuterol sulfate  (90 Base) MCG/ACT inhaler, Inhale 2 puffs Every 4 (Four) Hours As Needed for Wheezing or Shortness of Air., Disp: 6.7 g, Rfl: 0  •  Blood Pressure Monitoring (Blood Pressure Cuff) misc, 1 Units As Needed (blood pressure checks)., Disp: 1 each, Rfl: 0  •  carvedilol (COREG) 6.25 MG tablet, Take 1 tablet by mouth 2 (Two) Times a Day With Meals., Disp: 60 tablet, Rfl: 5  •  furosemide (LASIX) 40 MG tablet, Take 1 tablet by mouth twice daily for 1 week. Then decrease to 1 tablet by mouth once daily., Disp: 90 tablet, Rfl: 3  •  ibuprofen (ADVIL,MOTRIN) 600 MG tablet, Take 600 mg by mouth 3 (Three) Times a Day With Meals., Disp: , Rfl:   •  losartan (COZAAR) 50 MG tablet, Take 1 tablet by mouth Daily., Disp: 90 tablet, Rfl: 3  •  ondansetron ODT (ZOFRAN-ODT) " "8 MG disintegrating tablet, TAKE 1 TAB BY MOUTH EVERY 8 HOURS AS NEEDED, Disp: , Rfl:   •  spironolactone (Aldactone) 25 MG tablet, Take 1 tablet by mouth Daily., Disp: 30 tablet, Rfl: 5  •  trimethoprim-polymyxin b (POLYTRIM) 92647-3.1 UNIT/ML-% ophthalmic solution, INSTILL 1 DROP IN RIGHT EYE FOUR TIMES DAILY, Disp: , Rfl:   •  hydrOXYzine (ATARAX) 50 MG tablet, PLEASE SEE ATTACHED FOR DETAILED DIRECTIONS, Disp: , Rfl:   •  sertraline (ZOLOFT) 100 MG tablet, Take 100 mg by mouth Daily., Disp: , Rfl:   •  traZODone (DESYREL) 50 MG tablet, , Disp: , Rfl:     VITALS:  /80   Pulse 74   Temp 98.5 °F (36.9 °C)   Resp 16   Ht 175.3 cm (69\")   Wt 96.2 kg (212 lb)   SpO2 95%   BMI 31.31 kg/m²     Physical Exam  Vitals reviewed.   HENT:      Mouth/Throat:      Mouth: Mucous membranes are moist.   Cardiovascular:      Rate and Rhythm: Normal rate and regular rhythm.      Heart sounds: Murmur heard.      Comments: Trace bilateral lower ext ankle edema  Pulmonary:      Effort: Pulmonary effort is normal. No respiratory distress.      Breath sounds: Normal breath sounds.   Skin:     General: Skin is warm and dry.   Neurological:      General: No focal deficit present.      Mental Status: He is alert and oriented to person, place, and time.   Psychiatric:         Mood and Affect: Mood normal.         Behavior: Behavior normal.         Result Review :            Assessment and Plan    Diagnoses and all orders for this visit:    1. Essential hypertension (Primary)  -     spironolactone (Aldactone) 25 MG tablet; Take 1 tablet by mouth Daily.  Dispense: 90 tablet; Refill: 1  -     losartan (COZAAR) 50 MG tablet; Take 1 tablet by mouth Daily.  Dispense: 90 tablet; Refill: 1  -     carvedilol (COREG) 6.25 MG tablet; Take 1 tablet by mouth 2 (Two) Times a Day With Meals.  Dispense: 180 tablet; Refill: 1  -     furosemide (LASIX) 40 MG tablet; Take 1 tablet by mouth twice daily for 1 week. Then decrease to 1 tablet by mouth " once daily.  Dispense: 90 tablet; Refill: 1  -     CBC (No Diff); Future  -     Comprehensive Metabolic Panel; Future  -     Microalbumin / Creatinine Urine Ratio - Urine, Clean Catch; Future  -     Lipid Panel; Future    2. Chronic systolic congestive heart failure (HCC)  -     spironolactone (Aldactone) 25 MG tablet; Take 1 tablet by mouth Daily.  Dispense: 90 tablet; Refill: 1  -     losartan (COZAAR) 50 MG tablet; Take 1 tablet by mouth Daily.  Dispense: 90 tablet; Refill: 1  -     carvedilol (COREG) 6.25 MG tablet; Take 1 tablet by mouth 2 (Two) Times a Day With Meals.  Dispense: 180 tablet; Refill: 1  -     furosemide (LASIX) 40 MG tablet; Take 1 tablet by mouth twice daily for 1 week. Then decrease to 1 tablet by mouth once daily.  Dispense: 90 tablet; Refill: 1    3. Bronchitis  -     albuterol sulfate  (90 Base) MCG/ACT inhaler; Inhale 2 puffs Every 4 (Four) Hours As Needed for Wheezing or Shortness of Air.  Dispense: 6.7 g; Refill: 0    4. Nausea  -     ondansetron ODT (ZOFRAN-ODT) 8 MG disintegrating tablet; Place 1 tablet on the tongue Every 8 (Eight) Hours As Needed for Nausea or Vomiting.  Dispense: 60 tablet; Refill: 5    5. Arthralgia, unspecified joint  -     ibuprofen (ADVIL,MOTRIN) 600 MG tablet; Take 1 tablet by mouth Every 8 (Eight) Hours As Needed for Mild Pain .  Dispense: 30 tablet; Refill: 2    6. Prediabetes  -     Hemoglobin A1c; Future    Other orders  -     potassium chloride (K-DUR,KLOR-CON) 20 MEQ CR tablet; Take 1 tablet by mouth Daily.  Dispense: 90 tablet; Refill: 1    Stressed importance of getting labs updated with medications that can affect kidney and electrolytes. Need to schedule updated appt with cardiology for routine follow up.   Need to schedule FU with new pcp in 6 months.    I discussed the patients findings and my recommendations with patient.  Patient was encouraged to keep me informed of any acute changes, lack of improvement, or any new concerning symptoms.   Patient voiced understanding of all instructions and denied further questions.      Follow Up   Return in about 6 months (around 12/6/2022), or if symptoms worsen or fail to improve.      Electronically signed by:    GAGAN Kerr  06/06/2022

## 2022-07-18 ENCOUNTER — TELEPHONE (OUTPATIENT)
Dept: CARDIOLOGY | Facility: CLINIC | Age: 62
End: 2022-07-18

## 2022-08-17 NOTE — PROGRESS NOTES
OFFICE VISIT  NOTE  [facility] CARDIOLOGY      Name: Alejo Condon    Date: 2022  MRN:  [id number]  :  1960      REFERRING/PRIMARY PROVIDER:  Kathryn Bragg APRN     Chief Complaint   Patient presents with   • Acute systolic CHF (congestive heart failure) (CMS/HCC)       HPI: Alejo Condon is a 62 y.o. male who presents today for overdue follow-up for acute systolic CHF.  Associated history of hypertension, previous opioid dependence, alcohol abuse, tobacco abuse.  Was told by chiropractor 20 years ago he had an enlarged heart but never followed up with cardiology.  Presented to ER 3/2020 for productive cough, treated for bronchitis, found to have ejection fraction 31% on echo 2020, diastolic dysfunction, biatrial enlargement, appears to be chronic heart failure.  Started on carvedilol, Entresto, and spironolactone by heart failure clinic. Negative cath 2020.  Has not taken Entresto due to cost, and this was replaced with Losartan a year ago.   Stop drinking alcohol, not smoking. Repeat echo 2021 showed LVEF 36-40%.  Has been sober for about 8 weeks.  Occasional weight gain but overall cardiac symptoms are improved.       ROS:Pertinent positives as listed in the HPI.  All other systems reviewed and negative.    Past Medical History:   Diagnosis Date   • Acute systolic CHF (congestive heart failure) (HCC) 2020    Echo 20 LVEF = 31.0%. Left ventricular diastolic dysfunction is noted (grade III w/high LAP) consistent with reversible restrictive pattern Left atrial volume is moderately increased.   • Diverticulitis    • Hyperlipidemia    • Hypertension    • Opiate addiction (HCC)     Resolved since on suboxone       Past Surgical History:   Procedure Laterality Date   • ANAL FISTULA REPAIR N/A    • CARDIAC CATHETERIZATION N/A 2020    Procedure: Left Heart Cath;  Surgeon: Garrett Trejo MD;  Location: [facility] CATH INVASIVE LOCATION;  Service:  "Cardiovascular;  Laterality: N/A;   • FINGER SURGERY     • REFRACTIVE SURGERY     • RETINAL DETACHMENT REPAIR  07/2021       Social History     Socioeconomic History   • Marital status:    Tobacco Use   • Smoking status: Current Every Day Smoker     Packs/day: 0.50     Years: 20.00     Pack years: 10.00   • Smokeless tobacco: Never Used   • Tobacco comment: on and off since he was 6 years old   Substance and Sexual Activity   • Alcohol use: Yes     Comment: \"every other day\"   • Drug use: Yes     Types: Marijuana     Comment: quit pills  2018 but still smokes marijuana   • Sexual activity: Defer       Family History   Problem Relation Age of Onset   • Hypertension Mother    • Hyperlipidemia Mother    • Heart failure Mother    • Heart attack Father 88   • Cardiomyopathy Brother 36        Patient reports he had SCD that they attributed to agent orange        No Known Allergies    Current Outpatient Medications   Medication Instructions   • albuterol sulfate  (90 Base) MCG/ACT inhaler 2 puffs, Inhalation, Every 4 Hours PRN   • Blood Pressure Monitoring (Blood Pressure Cuff) misc 1 Units, Does not apply, As Needed   • buprenorphine-naloxone (SUBOXONE) 8-2 MG film film PLACE 2.75 FILM UNDER TONGUE ONCE A DAY   • carvedilol (COREG) 6.25 mg, Oral, 2 Times Daily With Meals   • furosemide (LASIX) 40 MG tablet Take 1 tablet by mouth twice daily for 1 week. Then decrease to 1 tablet by mouth once daily.   • hydrOXYzine (ATARAX) 50 MG tablet Every 4 Hours PRN   • ibuprofen (ADVIL,MOTRIN) 600 mg, Oral, Every 8 Hours PRN   • losartan (COZAAR) 50 mg, Oral, Daily   • ondansetron ODT (ZOFRAN-ODT) 8 mg, Translingual, Every 8 Hours PRN   • potassium chloride (K-DUR,KLOR-CON) 20 MEQ CR tablet 20 mEq, Oral, Daily   • sertraline (ZOLOFT) 100 mg, Oral, Daily   • spironolactone (ALDACTONE) 25 mg, Oral, Daily   • traZODone (DESYREL) 50 MG tablet Nightly       Vitals:    08/18/22 1007   BP: 124/72   BP Location: Right arm " "  Patient Position: Sitting   Pulse: 70   SpO2: 96%   Weight: 98.8 kg (217 lb 12.8 oz)   Height: 175.3 cm (69\")     Body mass index is 32.16 kg/m².    PHYSICAL EXAM:    General Appearance:   · well developed  · well nourished  Neck:  · thyroid not enlarged  · supple  Respiratory:  · no respiratory distress  · normal breath sounds  · no rales  Cardiovascular:  · no jugular venous distention  · regular rhythm  · apical impulse normal  · S1 normal, S2 normal  · no S3, no S4   · no murmur  · no rub, no thrill  · lower extremity edema: none    Skin:   warm, dry    RESULTS:   Procedures    Results for orders placed during the hospital encounter of 08/25/21    Adult Transthoracic Echo Limited W/ Cont if Necessary Per Protocol    Interpretation Summary  · Left ventricular ejection fraction appears to be 36 - 40%. Left ventricular systolic function is moderately decreased.  · The left ventricular cavity is mildly dilated.      Labs:  Lab Results   Component Value Date    CHOL 162 08/25/2020    TRIG 73 08/25/2020    HDL 74 (H) 08/25/2020    LDL 73 08/25/2020    AST 25 07/10/2021    ALT 34 07/10/2021     Lab Results   Component Value Date    HGBA1C 5.60 08/25/2020     Creatinine   Date Value Ref Range Status   07/10/2021 0.94 0.76 - 1.27 mg/dL Final   04/30/2021 0.98 0.76 - 1.27 mg/dL Final   01/24/2021 0.97 0.76 - 1.27 mg/dL Final   08/25/2020 0.80 0.60 - 1.30 mg/dL Final     Comment:     Serial Number: [id number]:  [id number]     eGFR Non  Amer   Date Value Ref Range Status   07/10/2021 81 >60 mL/min/1.73 Final   04/30/2021 78 >60 mL/min/1.73 Final   01/24/2021 79 >60 mL/min/1.73 Final       ASSESSMENT:  Problem List Items Addressed This Visit        Cardiac and Vasculature    Hypertension    Hyperlipidemia LDL goal <100    Acute systolic CHF (congestive heart failure) (HCC) - Primary    Overview     8/2021 Limited Echo: LVEF 36-40%    Echo 5/6/20 LVEF = 31.0%, Left ventricular diastolic dysfunction is noted (grade " III w/high LAP) consistent with reversible restrictive pattern, left atrial volume is moderately increased.    8/25/2020: Normal cardiac catheterization, normal coronaries, LVEDP 5 mmHg.            Mental Health    Alcohol abuse       Tobacco    Tobacco abuse          PLAN:  1.  Acute on chronic systolic heart failure, EF 36-40%:  Normal coronaries 8/25/2020, presumptive etiology will be alcohol and hypertension related  Echo 08/2021 with slight improvement in LVEF of 36-40%  Continue spironolactone 25 mg daily, carvedilol and Losartan   Could not afford Entresto   Continue Lasix 40mg daily   Low-sodium diet  Discussed importance of continued alcohol and tobacco abstinence     Discussed importance of getting his labs drawn so we can check electrolytes    Repeat echo in 3 months.    2.  Essential hypertension:  Stopped Entresto due to cost, continue Losartan   Continue carvedilol and spironolactone increase as tolerated for blood pressure goal of less than 130/80  Currently well controlled     3.  Alcohol and tobacco abuse, reports abstinence  Encouraged patient to continue this     High risk for morbidity mortality due to low ejection fraction, acute on chronic systolic heart failure, and medication nonadherence.        Advance Care Planning   ACP discussion was held with the patient during this visit. Patient does not have an advance directive, declines further assistance.        Follow-up   Return in about 1 year (around 8/18/2023).  Garrett Trejo MD, [facility]  Interventional Cardiology

## 2022-08-18 ENCOUNTER — OFFICE VISIT (OUTPATIENT)
Dept: CARDIOLOGY | Facility: CLINIC | Age: 62
End: 2022-08-18

## 2022-08-18 VITALS
SYSTOLIC BLOOD PRESSURE: 124 MMHG | OXYGEN SATURATION: 96 % | BODY MASS INDEX: 32.26 KG/M2 | HEIGHT: 69 IN | DIASTOLIC BLOOD PRESSURE: 72 MMHG | WEIGHT: 217.8 LBS | HEART RATE: 70 BPM

## 2022-08-18 DIAGNOSIS — I10 PRIMARY HYPERTENSION: ICD-10-CM

## 2022-08-18 DIAGNOSIS — E78.5 HYPERLIPIDEMIA LDL GOAL <100: ICD-10-CM

## 2022-08-18 DIAGNOSIS — F10.10 ALCOHOL ABUSE: ICD-10-CM

## 2022-08-18 DIAGNOSIS — I50.21 ACUTE SYSTOLIC CHF (CONGESTIVE HEART FAILURE): Primary | ICD-10-CM

## 2022-08-18 DIAGNOSIS — Z72.0 TOBACCO ABUSE: ICD-10-CM

## 2022-08-18 PROCEDURE — 99214 OFFICE O/P EST MOD 30 MIN: CPT | Performed by: INTERNAL MEDICINE

## 2022-08-18 RX ORDER — BUPRENORPHINE AND NALOXONE 8; 2 MG/1; MG/1
FILM, SOLUBLE BUCCAL; SUBLINGUAL
COMMUNITY
Start: 2022-08-12

## 2022-11-05 DIAGNOSIS — I50.22 CHRONIC SYSTOLIC CONGESTIVE HEART FAILURE: ICD-10-CM

## 2022-11-05 DIAGNOSIS — I10 ESSENTIAL HYPERTENSION: ICD-10-CM

## 2022-11-07 ENCOUNTER — TELEPHONE (OUTPATIENT)
Dept: INTERNAL MEDICINE | Facility: CLINIC | Age: 62
End: 2022-11-07

## 2022-11-07 RX ORDER — LOSARTAN POTASSIUM 50 MG/1
TABLET ORAL
Qty: 90 TABLET | Refills: 0 | Status: SHIPPED | OUTPATIENT
Start: 2022-11-07 | End: 2022-12-20 | Stop reason: SDUPTHER

## 2022-11-07 NOTE — TELEPHONE ENCOUNTER
PT STOPPED BY THE OFFICE STATING HE HAS ACCIDENTALLY BEEN TAKING HIS LOSARTAN 50 MG TAB WRONG, AND HE IS OUT COMPLETELY.    HE STATED HE CONTACTED THE PHARMACY, BUT I THINK THEY LET HIM KNOW IT WAS TOO SOON FOR A REFILL.    PLEASE ADVISE.

## 2022-11-07 NOTE — TELEPHONE ENCOUNTER
Last Office Visit: 06/06/2022-Malena  Next Office Visit: 12/12/2022-Elizabeth    Labs completed in past 6 months? no  Labs completed in past year? yes    Last Refill Date:06/06/2022  Quantity:90  Refills:1    Pharmacy:     Please review pended refill request for any changes needed on refills or quantities. Thank you!

## 2022-11-07 NOTE — TELEPHONE ENCOUNTER
Pt scheduled to establish with Elizabeth on 12/12/22    Please review and advise if a refill is appropriate.  Losartan is to be taken once daily. Last filled 6/6/22 #90 and one refill.

## 2022-11-30 ENCOUNTER — HOSPITAL ENCOUNTER (OUTPATIENT)
Dept: CARDIOLOGY | Facility: HOSPITAL | Age: 62
Discharge: HOME | End: 2022-11-30
Admitting: INTERNAL MEDICINE

## 2022-11-30 VITALS — WEIGHT: 216.05 LBS | BODY MASS INDEX: 32 KG/M2 | HEIGHT: 69 IN

## 2022-11-30 DIAGNOSIS — I50.21 ACUTE SYSTOLIC CHF (CONGESTIVE HEART FAILURE): ICD-10-CM

## 2022-11-30 PROCEDURE — 93308 TTE F-UP OR LMTD: CPT

## 2022-11-30 PROCEDURE — 93308 TTE F-UP OR LMTD: CPT | Performed by: INTERNAL MEDICINE

## 2022-12-01 LAB
BH CV ECHO MEAS - AO ROOT DIAM: 3.8 CM
BH CV ECHO MEAS - EDV(CUBED): 178.5 ML
BH CV ECHO MEAS - EDV(MOD-SP2): 72 ML
BH CV ECHO MEAS - EDV(MOD-SP4): 86 ML
BH CV ECHO MEAS - EF(MOD-BP): 50 %
BH CV ECHO MEAS - EF(MOD-SP2): 45.8 %
BH CV ECHO MEAS - EF(MOD-SP4): 50 %
BH CV ECHO MEAS - ESV(CUBED): 76.8 ML
BH CV ECHO MEAS - ESV(MOD-SP2): 39 ML
BH CV ECHO MEAS - ESV(MOD-SP4): 43 ML
BH CV ECHO MEAS - FS: 24.5 %
BH CV ECHO MEAS - IVS/LVPW: 0.98 CM
BH CV ECHO MEAS - IVSD: 1.24 CM
BH CV ECHO MEAS - LA DIMENSION: 4.8 CM
BH CV ECHO MEAS - LAT PEAK E' VEL: 9.5 CM/SEC
BH CV ECHO MEAS - LV MASS(C)D: 300.9 GRAMS
BH CV ECHO MEAS - LVIDD: 5.6 CM
BH CV ECHO MEAS - LVIDS: 4.3 CM
BH CV ECHO MEAS - LVOT AREA: 5.7 CM2
BH CV ECHO MEAS - LVOT DIAM: 2.7 CM
BH CV ECHO MEAS - LVPWD: 1.27 CM
BH CV ECHO MEAS - MED PEAK E' VEL: 6.4 CM/SEC
BH CV ECHO MEAS - MV A MAX VEL: 84.9 CM/SEC
BH CV ECHO MEAS - MV DEC TIME: 0.26 MSEC
BH CV ECHO MEAS - MV E MAX VEL: 59.7 CM/SEC
BH CV ECHO MEAS - MV E/A: 0.7
BH CV ECHO MEAS - SV(MOD-SP2): 33 ML
BH CV ECHO MEAS - SV(MOD-SP4): 43 ML
BH CV ECHO MEAS - TAPSE (>1.6): 2 CM
BH CV ECHO MEASUREMENTS AVERAGE E/E' RATIO: 7.51
BH CV XLRA - TDI S': 13.3 CM/SEC
LEFT ATRIUM VOLUME INDEX: 27.1 ML/M2
MAXIMAL PREDICTED HEART RATE: 157 BPM
STRESS TARGET HR: 133 BPM

## 2022-12-02 ENCOUNTER — TELEPHONE (OUTPATIENT)
Dept: CARDIOLOGY | Facility: CLINIC | Age: 62
End: 2022-12-02

## 2022-12-02 NOTE — TELEPHONE ENCOUNTER
----- Message from Garrett Trejo MD sent at 12/1/2022  4:45 PM EST -----  Please inform the patient of their test results. Thank you.

## 2022-12-13 ENCOUNTER — TELEPHONE (OUTPATIENT)
Dept: INTERNAL MEDICINE | Facility: CLINIC | Age: 62
End: 2022-12-13

## 2022-12-13 NOTE — TELEPHONE ENCOUNTER
Hub staff attempted to follow warm transfer process and was unsuccessful     Caller: Alejo Condon    Relationship to patient: Self    Best call back number: [phone]    Patient is needing: PATIENT NEEDS TO BE RESCHEDULED TO RE-ESTABLISH CARE BUT HAS CONFLICT WITH TIMES AND DATES AND NEEDS MEDICATION REFILLS. PATIENT WOULD ALSO LIKE TO VERIFY WHETHER HE HAS ACTIVE LABS.

## 2022-12-16 NOTE — TELEPHONE ENCOUNTER
TASHI VIVAR (ON VERBAL) CALLED TO MAKE A NEW APPT TO [facility] FROM [facility] WHICH I MADE FOR [facility] ON Tuesday WITH ALVA BUT SHE HAD ALSO CALLED ABOUT REFILLS FOR:    spironolactone (Aldactone) 25 MG tablet     AND    carvedilol (COREG) 6.25 MG tablet       AT LEAST ENOUGH TO GET HIM TO Tuesday WHEN HE SEES MELANY HOOD ON [employer] PLEASE    THE MED REQUESTS WERE ON THE ORIGINAL TELEPHONE ENCOUNTER BUT I THINK THEY GOT OVERLOOKED

## 2022-12-19 DIAGNOSIS — I50.22 CHRONIC SYSTOLIC CONGESTIVE HEART FAILURE: ICD-10-CM

## 2022-12-19 DIAGNOSIS — I10 ESSENTIAL HYPERTENSION: ICD-10-CM

## 2022-12-19 RX ORDER — CARVEDILOL 6.25 MG/1
6.25 TABLET ORAL 2 TIMES DAILY WITH MEALS
Qty: 14 TABLET | Refills: 0 | Status: SHIPPED | OUTPATIENT
Start: 2022-12-19 | End: 2022-12-20 | Stop reason: SDUPTHER

## 2022-12-19 RX ORDER — SPIRONOLACTONE 25 MG/1
25 TABLET ORAL DAILY
Qty: 7 TABLET | Refills: 0 | Status: SHIPPED | OUTPATIENT
Start: 2022-12-19 | End: 2022-12-20 | Stop reason: SDUPTHER

## 2022-12-20 ENCOUNTER — OFFICE VISIT (OUTPATIENT)
Dept: INTERNAL MEDICINE | Facility: CLINIC | Age: 62
End: 2022-12-20

## 2022-12-20 ENCOUNTER — LAB (OUTPATIENT)
Dept: LAB | Facility: HOSPITAL | Age: 62
End: 2022-12-20

## 2022-12-20 VITALS
BODY MASS INDEX: 32.58 KG/M2 | HEIGHT: 69 IN | OXYGEN SATURATION: 98 % | DIASTOLIC BLOOD PRESSURE: 82 MMHG | SYSTOLIC BLOOD PRESSURE: 134 MMHG | HEART RATE: 60 BPM | WEIGHT: 220 LBS | TEMPERATURE: 96.9 F

## 2022-12-20 DIAGNOSIS — M19.90 ARTHRITIS: ICD-10-CM

## 2022-12-20 DIAGNOSIS — F41.9 ANXIETY: ICD-10-CM

## 2022-12-20 DIAGNOSIS — I10 ESSENTIAL HYPERTENSION: ICD-10-CM

## 2022-12-20 DIAGNOSIS — Z76.89 ENCOUNTER TO ESTABLISH CARE: Primary | ICD-10-CM

## 2022-12-20 DIAGNOSIS — Z13.1 SCREENING FOR DIABETES MELLITUS (DM): ICD-10-CM

## 2022-12-20 DIAGNOSIS — E78.5 HYPERLIPIDEMIA LDL GOAL <100: ICD-10-CM

## 2022-12-20 DIAGNOSIS — I50.22 CHRONIC SYSTOLIC CONGESTIVE HEART FAILURE: ICD-10-CM

## 2022-12-20 DIAGNOSIS — I10 PRIMARY HYPERTENSION: ICD-10-CM

## 2022-12-20 LAB
ALBUMIN SERPL-MCNC: 4.3 G/DL (ref 3.5–5.2)
ALBUMIN/GLOB SERPL: 1.8 G/DL
ALP SERPL-CCNC: 70 U/L (ref 39–117)
ALT SERPL W P-5'-P-CCNC: 16 U/L (ref 1–41)
ANION GAP SERPL CALCULATED.3IONS-SCNC: 7.2 MMOL/L (ref 5–15)
AST SERPL-CCNC: 17 U/L (ref 1–40)
BACTERIA UR QL AUTO: NORMAL /HPF
BILIRUB SERPL-MCNC: 0.4 MG/DL (ref 0–1.2)
BILIRUB UR QL STRIP: NEGATIVE
BUN SERPL-MCNC: 11 MG/DL (ref 8–23)
BUN/CREAT SERPL: 12.6 (ref 7–25)
CALCIUM SPEC-SCNC: 9 MG/DL (ref 8.6–10.5)
CHLORIDE SERPL-SCNC: 104 MMOL/L (ref 98–107)
CHOLEST SERPL-MCNC: 266 MG/DL (ref 0–200)
CLARITY UR: CLEAR
CO2 SERPL-SCNC: 30.8 MMOL/L (ref 22–29)
COLOR UR: YELLOW
CREAT SERPL-MCNC: 0.87 MG/DL (ref 0.76–1.27)
DEPRECATED RDW RBC AUTO: 43.9 FL (ref 37–54)
EGFRCR SERPLBLD CKD-EPI 2021: 97 ML/MIN/1.73
ERYTHROCYTE [DISTWIDTH] IN BLOOD BY AUTOMATED COUNT: 13.1 % (ref 12.3–15.4)
GLOBULIN UR ELPH-MCNC: 2.4 GM/DL
GLUCOSE SERPL-MCNC: 94 MG/DL (ref 65–99)
GLUCOSE UR STRIP-MCNC: NEGATIVE MG/DL
HCT VFR BLD AUTO: 42.5 % (ref 37.5–51)
HDLC SERPL-MCNC: 77 MG/DL (ref 40–60)
HGB BLD-MCNC: 14 G/DL (ref 13–17.7)
HGB UR QL STRIP.AUTO: NEGATIVE
HYALINE CASTS UR QL AUTO: NORMAL /LPF
KETONES UR QL STRIP: NEGATIVE
LDLC SERPL CALC-MCNC: 160 MG/DL (ref 0–100)
LDLC/HDLC SERPL: 2.03 {RATIO}
LEUKOCYTE ESTERASE UR QL STRIP.AUTO: ABNORMAL
MCH RBC QN AUTO: 29.8 PG (ref 26.6–33)
MCHC RBC AUTO-ENTMCNC: 32.9 G/DL (ref 31.5–35.7)
MCV RBC AUTO: 90.4 FL (ref 79–97)
NITRITE UR QL STRIP: NEGATIVE
PH UR STRIP.AUTO: 7 [PH] (ref 5–8)
PLATELET # BLD AUTO: 188 10*3/MM3 (ref 140–450)
PMV BLD AUTO: 10.8 FL (ref 6–12)
POTASSIUM SERPL-SCNC: 4.5 MMOL/L (ref 3.5–5.2)
PROT SERPL-MCNC: 6.7 G/DL (ref 6–8.5)
PROT UR QL STRIP: ABNORMAL
RBC # BLD AUTO: 4.7 10*6/MM3 (ref 4.14–5.8)
RBC # UR STRIP: NORMAL /HPF
REF LAB TEST METHOD: NORMAL
SODIUM SERPL-SCNC: 142 MMOL/L (ref 136–145)
SP GR UR STRIP: 1.02 (ref 1–1.03)
SQUAMOUS #/AREA URNS HPF: NORMAL /HPF
TRIGL SERPL-MCNC: 162 MG/DL (ref 0–150)
TSH SERPL DL<=0.05 MIU/L-ACNC: 3.16 UIU/ML (ref 0.27–4.2)
UROBILINOGEN UR QL STRIP: ABNORMAL
VLDLC SERPL-MCNC: 29 MG/DL (ref 5–40)
WBC # UR STRIP: NORMAL /HPF
WBC NRBC COR # BLD: 5.91 10*3/MM3 (ref 3.4–10.8)

## 2022-12-20 PROCEDURE — 80053 COMPREHEN METABOLIC PANEL: CPT | Performed by: NURSE PRACTITIONER

## 2022-12-20 PROCEDURE — 99214 OFFICE O/P EST MOD 30 MIN: CPT | Performed by: NURSE PRACTITIONER

## 2022-12-20 PROCEDURE — 81001 URINALYSIS AUTO W/SCOPE: CPT | Performed by: NURSE PRACTITIONER

## 2022-12-20 PROCEDURE — 85027 COMPLETE CBC AUTOMATED: CPT | Performed by: NURSE PRACTITIONER

## 2022-12-20 PROCEDURE — 84443 ASSAY THYROID STIM HORMONE: CPT | Performed by: NURSE PRACTITIONER

## 2022-12-20 PROCEDURE — 80061 LIPID PANEL: CPT | Performed by: NURSE PRACTITIONER

## 2022-12-20 RX ORDER — SPIRONOLACTONE 25 MG/1
25 TABLET ORAL DAILY
Qty: 30 TABLET | Refills: 3 | Status: SHIPPED | OUTPATIENT
Start: 2022-12-20

## 2022-12-20 RX ORDER — MELOXICAM 7.5 MG/1
7.5 TABLET ORAL DAILY
Qty: 30 TABLET | Refills: 3 | Status: SHIPPED | OUTPATIENT
Start: 2022-12-20

## 2022-12-20 RX ORDER — SERTRALINE HYDROCHLORIDE 100 MG/1
100 TABLET, FILM COATED ORAL DAILY
Qty: 30 TABLET | Refills: 3 | Status: SHIPPED | OUTPATIENT
Start: 2022-12-20

## 2022-12-20 RX ORDER — CARVEDILOL 6.25 MG/1
6.25 TABLET ORAL 2 TIMES DAILY WITH MEALS
Qty: 30 TABLET | Refills: 3 | Status: SHIPPED | OUTPATIENT
Start: 2022-12-20 | End: 2022-12-23 | Stop reason: SDUPTHER

## 2022-12-20 RX ORDER — LOSARTAN POTASSIUM 50 MG/1
50 TABLET ORAL DAILY
Qty: 30 TABLET | Refills: 3 | Status: SHIPPED | OUTPATIENT
Start: 2022-12-20 | End: 2023-02-03

## 2022-12-20 RX ORDER — MELOXICAM 7.5 MG/1
7.5 TABLET ORAL DAILY
COMMUNITY
Start: 2022-11-18 | End: 2022-12-20 | Stop reason: SDUPTHER

## 2022-12-20 RX ORDER — HYDROXYZINE 50 MG/1
50 TABLET, FILM COATED ORAL EVERY 6 HOURS PRN
Qty: 30 TABLET | Refills: 3 | Status: SHIPPED | OUTPATIENT
Start: 2022-12-20

## 2022-12-20 RX ORDER — FUROSEMIDE 40 MG/1
TABLET ORAL
Qty: 30 TABLET | Refills: 3 | Status: SHIPPED | OUTPATIENT
Start: 2022-12-20

## 2022-12-20 RX ORDER — TRAZODONE HYDROCHLORIDE 50 MG/1
50 TABLET ORAL NIGHTLY
Qty: 30 TABLET | Refills: 3 | Status: SHIPPED | OUTPATIENT
Start: 2022-12-20 | End: 2023-01-12

## 2022-12-20 NOTE — PROGRESS NOTES
Office Note     Name: Alejo Condon    : 1960     MRN: [id number]     Chief Complaint  Establish Care, Med Refill, and Foot Swelling (Pain on both feet)    Subjective     History of Present Illness:  Alejo Condon is a 62 y.o. male who presents today to establish care with a new provider and for medication refills.  Patient was previously being seen by GAGAN Carrasco in this office.  Past medical history and current home medications reviewed with the patient.  Patient does follow with the Suboxone clinic and reports they recently gave him 1 month of meloxicam which he felt was very helpful with his arthritis.  He is also reporting issues with urinary incontinence secondary to urgency and asking if he needs to continue both diuretics.  He is on Lasix 40 mg daily and Aldactone 25 mg daily.  He is followed by cardiology for congestive heart failure.  He did have his most recent echo on  which showed an improvement in his EF from 31% up to 51 to 55%.  It did also note mild left ventricle hypertrophy.  He denies further complaints or concerns at this time.    Review of Systems   Constitutional: Negative.    HENT: Negative.    Respiratory: Negative.    Cardiovascular: Negative.    Gastrointestinal: Negative.    Genitourinary: Positive for urgency.   Musculoskeletal: Positive for arthralgias.       Past Medical History:   Diagnosis Date   • Acute systolic CHF (congestive heart failure) (HCC) 2020    Echo 20 LVEF = 31.0%. Left ventricular diastolic dysfunction is noted (grade III w/high LAP) consistent with reversible restrictive pattern Left atrial volume is moderately increased.   • Diverticulitis    • Hyperlipidemia    • Hypertension    • Opiate addiction (HCC)     Resolved since on suboxone       Past Surgical History:   Procedure Laterality Date   • ANAL FISTULA REPAIR N/A    • CARDIAC CATHETERIZATION N/A 2020    Procedure: Left Heart Cath;  Surgeon: Garrett Trejo MD;   "Location: [facility] INVASIVE LOCATION;  Service: Cardiovascular;  Laterality: N/A;   • FINGER SURGERY     • REFRACTIVE SURGERY     • RETINAL DETACHMENT REPAIR  07/2021       Social History     Socioeconomic History   • Marital status:    Tobacco Use   • Smoking status: Every Day     Packs/day: 0.50     Years: 20.00     Pack years: 10.00     Types: Cigarettes   • Smokeless tobacco: Never   • Tobacco comments:     on and off since he was 6 years old   Substance and Sexual Activity   • Alcohol use: Yes     Comment: \"every other day\"   • Drug use: Yes     Types: Marijuana     Comment: quit pills  2018 but still smokes marijuana   • Sexual activity: Defer         Current Outpatient Medications:   •  albuterol sulfate  (90 Base) MCG/ACT inhaler, Inhale 2 puffs Every 4 (Four) Hours As Needed for Wheezing or Shortness of Air., Disp: 6.7 g, Rfl: 0  •  buprenorphine-naloxone (SUBOXONE) 8-2 MG film film, PLACE 2.75 FILM UNDER TONGUE ONCE A DAY, Disp: , Rfl:   •  carvedilol (COREG) 6.25 MG tablet, Take 1 tablet by mouth 2 (Two) Times a Day With Meals., Disp: 30 tablet, Rfl: 3  •  furosemide (LASIX) 40 MG tablet, Take 1 tablet by mouth twice daily for 1 week. Then decrease to 1 tablet by mouth once daily., Disp: 30 tablet, Rfl: 3  •  hydrOXYzine (ATARAX) 50 MG tablet, Take 1 tablet by mouth Every 6 (Six) Hours As Needed for Itching or Anxiety., Disp: 30 tablet, Rfl: 3  •  losartan (COZAAR) 50 MG tablet, Take 1 tablet by mouth Daily., Disp: 30 tablet, Rfl: 3  •  meloxicam (MOBIC) 7.5 MG tablet, Take 1 tablet by mouth Daily., Disp: 30 tablet, Rfl: 3  •  sertraline (ZOLOFT) 100 MG tablet, Take 1 tablet by mouth Daily., Disp: 30 tablet, Rfl: 3  •  spironolactone (Aldactone) 25 MG tablet, Take 1 tablet by mouth Daily., Disp: 30 tablet, Rfl: 3  •  traZODone (DESYREL) 50 MG tablet, Take 1 tablet by mouth Every Night., Disp: 30 tablet, Rfl: 3  •  Blood Pressure Monitoring (Blood Pressure Cuff) misc, 1 Units As Needed " "(blood pressure checks)., Disp: 1 each, Rfl: 0  •  ondansetron ODT (ZOFRAN-ODT) 8 MG disintegrating tablet, Place 1 tablet on the tongue Every 8 (Eight) Hours As Needed for Nausea or Vomiting., Disp: 60 tablet, Rfl: 5  •  potassium chloride (K-DUR,KLOR-CON) 20 MEQ CR tablet, Take 1 tablet by mouth Daily., Disp: 90 tablet, Rfl: 1    Objective     Vital Signs  /82   Pulse 60   Temp 96.9 °F (36.1 °C)   Ht 175.3 cm (69\")   Wt 99.8 kg (220 lb)   SpO2 98%   BMI 32.49 kg/m²   Estimated body mass index is 32.49 kg/m² as calculated from the following:    Height as of this encounter: 175.3 cm (69\").    Weight as of this encounter: 99.8 kg (220 lb).    BMI is >= 30 and <35. (Class 1 Obesity). The following options were offered after discussion;: Not addressed at this visit      Physical Exam  Constitutional:       Appearance: Normal appearance.   HENT:      Head: Normocephalic and atraumatic.      Nose: Nose normal.   Eyes:      Extraocular Movements: Extraocular movements intact.      Conjunctiva/sclera: Conjunctivae normal.      Pupils: Pupils are equal, round, and reactive to light.   Cardiovascular:      Rate and Rhythm: Normal rate and regular rhythm.   Pulmonary:      Effort: Pulmonary effort is normal. No respiratory distress.      Comments: Breath sounds diminished throughout  Musculoskeletal:         General: Normal range of motion.      Cervical back: Normal range of motion and neck supple.   Skin:     General: Skin is warm and dry.   Neurological:      General: No focal deficit present.      Mental Status: He is alert and oriented to person, place, and time. Mental status is at baseline.   Psychiatric:         Mood and Affect: Mood normal.         Behavior: Behavior normal.         Thought Content: Thought content normal.         Judgment: Judgment normal.          Assessment and Plan     Diagnoses and all orders for this visit:    1. Encounter to establish care (Primary)    2. Essential hypertension  -  "    losartan (COZAAR) 50 MG tablet; Take 1 tablet by mouth Daily.  Dispense: 30 tablet; Refill: 3  -     furosemide (LASIX) 40 MG tablet; Take 1 tablet by mouth twice daily for 1 week. Then decrease to 1 tablet by mouth once daily.  Dispense: 30 tablet; Refill: 3  -     carvedilol (COREG) 6.25 MG tablet; Take 1 tablet by mouth 2 (Two) Times a Day With Meals.  Dispense: 30 tablet; Refill: 3  -     spironolactone (Aldactone) 25 MG tablet; Take 1 tablet by mouth Daily.  Dispense: 30 tablet; Refill: 3    3. Chronic systolic congestive heart failure (HCC)  -     losartan (COZAAR) 50 MG tablet; Take 1 tablet by mouth Daily.  Dispense: 30 tablet; Refill: 3  -     furosemide (LASIX) 40 MG tablet; Take 1 tablet by mouth twice daily for 1 week. Then decrease to 1 tablet by mouth once daily.  Dispense: 30 tablet; Refill: 3  -     carvedilol (COREG) 6.25 MG tablet; Take 1 tablet by mouth 2 (Two) Times a Day With Meals.  Dispense: 30 tablet; Refill: 3  -     spironolactone (Aldactone) 25 MG tablet; Take 1 tablet by mouth Daily.  Dispense: 30 tablet; Refill: 3    4. Arthritis  -     meloxicam (MOBIC) 7.5 MG tablet; Take 1 tablet by mouth Daily.  Dispense: 30 tablet; Refill: 3    5. Hyperlipidemia LDL goal <100  -     Lipid Panel    6. Primary hypertension  -     Comprehensive Metabolic Panel  -     CBC (No Diff)  -     TSH  -     Urinalysis With Microscopic If Indicated (No Culture) - Urine, Clean Catch  -     Urinalysis, Microscopic Only - Urine, Clean Catch    7. Anxiety  -     sertraline (ZOLOFT) 100 MG tablet; Take 1 tablet by mouth Daily.  Dispense: 30 tablet; Refill: 3  -     traZODone (DESYREL) 50 MG tablet; Take 1 tablet by mouth Every Night.  Dispense: 30 tablet; Refill: 3  -     hydrOXYzine (ATARAX) 50 MG tablet; Take 1 tablet by mouth Every 6 (Six) Hours As Needed for Itching or Anxiety.  Dispense: 30 tablet; Refill: 3    8. Screening for diabetes mellitus (DM)    Plan:  Patient presents to establish care with a new  provider and for medication refills.  Patient is requesting refills on sertraline, trazodone, hydroxyzine, losartan, carvedilol, spironolactone, and furosemide.  Will give 30-day supply with 3 refills.  Advised patient to address diuretics with cardiology.  He can try to take furosemide every other day, but he will need to strictly monitor his weight daily.  If he notices any alterations in his weight or any swelling in his ankles, he will need to resume his furosemide 40 mg daily.  Patient is requesting a prescription for meloxicam 7.5 mg daily.  Will need to check basic labs today to monitor hemoglobin and creatinine with initiation of meloxicam.  Patient agreeable to a 4-month follow-up for annual physical exam with fasting labs.  Return to clinic sooner if needed.    Follow Up  Return in about 4 months (around 4/20/2023) for Annual.    GAGAN Qureshi    Part of this note may be an electronic transcription/translation of spoken language to printed text using the Dragon Dictation System.

## 2022-12-23 ENCOUNTER — TELEPHONE (OUTPATIENT)
Dept: INTERNAL MEDICINE | Facility: CLINIC | Age: 62
End: 2022-12-23

## 2022-12-23 DIAGNOSIS — I50.22 CHRONIC SYSTOLIC CONGESTIVE HEART FAILURE: ICD-10-CM

## 2022-12-23 DIAGNOSIS — I10 ESSENTIAL HYPERTENSION: ICD-10-CM

## 2022-12-23 RX ORDER — CARVEDILOL 6.25 MG/1
6.25 TABLET ORAL 2 TIMES DAILY WITH MEALS
Qty: 60 TABLET | Refills: 3 | Status: SHIPPED | OUTPATIENT
Start: 2022-12-23 | End: 2023-04-01

## 2022-12-23 NOTE — TELEPHONE ENCOUNTER
Caller: Alejo Condon    Relationship to patient: Self    Best call back number: [phone]    Patient is needing: CALL BACK ABOUT ORDERS PLACED. PATIENT STATES HE MISSED A CALL FROM  12/22

## 2022-12-23 NOTE — TELEPHONE ENCOUNTER
Spoke with patient regarding lab results, he verbalized understanding. Patient takes carvediol twice a day and would run out before his appointment. Sent in rx for 60 instead of 30 to last until his next appointment.

## 2023-01-05 ENCOUNTER — OFFICE VISIT (OUTPATIENT)
Dept: FAMILY MEDICINE CLINIC | Facility: CLINIC | Age: 63
End: 2023-01-05
Payer: MEDICARE

## 2023-01-05 VITALS
HEIGHT: 69 IN | OXYGEN SATURATION: 96 % | SYSTOLIC BLOOD PRESSURE: 124 MMHG | TEMPERATURE: 97.3 F | DIASTOLIC BLOOD PRESSURE: 64 MMHG | BODY MASS INDEX: 32.58 KG/M2 | WEIGHT: 220 LBS | HEART RATE: 63 BPM

## 2023-01-05 DIAGNOSIS — R06.02 SHORTNESS OF BREATH: Primary | ICD-10-CM

## 2023-01-05 PROCEDURE — 99213 OFFICE O/P EST LOW 20 MIN: CPT | Performed by: NURSE PRACTITIONER

## 2023-01-05 RX ORDER — OMEPRAZOLE AND SODIUM BICARBONATE 40; 1100 MG/1; MG/1
1 CAPSULE ORAL
COMMUNITY

## 2023-01-05 RX ORDER — METHYLPREDNISOLONE 4 MG/1
TABLET ORAL
Qty: 21 EACH | Refills: 0 | Status: SHIPPED | OUTPATIENT
Start: 2023-01-05

## 2023-01-05 NOTE — PROGRESS NOTES
Chief Complaint  Wheezing and Chest Pain (Pt. Stated that he bought a vape a few months ago and has trouble with wheezing and sweating since.)    Patient is here for an urgent care/acute visit.  Patient has an established, non-[facility] Primary Care Provider.     Abdiaziz Condon presents to [facility] for acute care (wheezing/chest discomfort).    Wheezing   This is a recurrent (shortness of breath and wheezing) problem. Episode onset: \"few months per pt\" The problem occurs daily. The problem has been gradually worsening. Associated symptoms include chest pain, chills and shortness of breath. Pertinent negatives include no abdominal pain, coryza, coughing, diarrhea, ear pain, fever, headaches, hemoptysis, neck pain, rash, rhinorrhea, sore throat, sputum production, swollen glands or vomiting. The symptoms are aggravated by pollens and smoke. He has tried beta agonist inhalers (lasix, spirolactone) for the symptoms. The treatment provided mild relief. His past medical history is significant for heart failure and past MI. There is no history of COPD or pneumonia. several overdoses with cardiac arrest from drug use/abuse   Chest Pain   This is a chronic (thoracic chest pain; worse with exertion) problem. Episode onset: \"few months ago\" The onset quality is gradual. The problem occurs intermittently. The problem has been waxing and waning. The quality of the pain is described as dull. The pain does not radiate. Associated symptoms include diaphoresis, exertional chest pressure, lower extremity edema, malaise/fatigue, palpitations and shortness of breath. Pertinent negatives include no abdominal pain, back pain, claudication, cough, dizziness, fever, headaches, hemoptysis, irregular heartbeat, leg pain, nausea, near-syncope, numbness, orthopnea, PND, sputum production, syncope, vomiting or weakness. The pain is aggravated by deep breathing, coughing, exertion and lifting. He  has tried rest (pt has established cardiologist) for the symptoms. The treatment provided mild relief. Risk factors include male gender, obesity, sedentary lifestyle, smoking/tobacco exposure, substance abuse and lack of exercise.   His past medical history is significant for CHF, hyperlipidemia, hypertension and MI. Past medical history comments: several overdoses with cardiac arrest from drug use/abuse Prior diagnostic workup includes echocardiogram (pt has extensive hx of drug use; currently on suboxone).     Objective   Vital Signs:  /64   Pulse 63   Temp 97.3 °F (36.3 °C)   Ht 175.3 cm (69.02\")   Wt 99.8 kg (220 lb)   SpO2 96%   BMI 32.47 kg/m²     BMI:   BMI is >= 30 and <35. (Class 1 Obesity). The following options were offered after discussion;: weight loss educational material (shared in after visit summary)      Physical Exam  Vitals and nursing note reviewed.   Constitutional:       General: He is awake.      Appearance: Normal appearance.   HENT:      Head: Normocephalic.      Right Ear: Hearing, tympanic membrane, ear canal and external ear normal.      Left Ear: Hearing, tympanic membrane, ear canal and external ear normal.      Nose: Nose normal.      Mouth/Throat:      Lips: Pink.      Mouth: Mucous membranes are moist.      Pharynx: Oropharynx is clear.   Eyes:      General: Lids are normal.      Conjunctiva/sclera: Conjunctivae normal.      Pupils: Pupils are equal, round, and reactive to light.   Cardiovascular:      Rate and Rhythm: Normal rate and regular rhythm.      Heart sounds: Normal heart sounds.   Pulmonary:      Effort: Pulmonary effort is normal.      Breath sounds: Examination of the right-upper field reveals rhonchi. Examination of the left-upper field reveals rhonchi. Examination of the right-middle field reveals rhonchi. Examination of the right-lower field reveals rhonchi. Examination of the left-lower field reveals rhonchi. Rhonchi present.   Abdominal:      General:  Abdomen is protuberant. Bowel sounds are normal.      Palpations: Abdomen is soft.      Tenderness: There is no abdominal tenderness.   Musculoskeletal:         General: Normal range of motion.      Cervical back: Normal range of motion and neck supple.   Skin:     General: Skin is warm and dry.      Capillary Refill: Capillary refill takes less than 2 seconds.   Neurological:      Mental Status: He is alert and oriented to person, place, and time.      Sensory: Sensation is intact.      Motor: Motor function is intact.      Coordination: Coordination is intact.      Gait: Gait is intact.   Psychiatric:         Attention and Perception: Attention and perception normal.         Mood and Affect: Affect normal. Mood is anxious.         Speech: Speech normal.         Behavior: Behavior normal. Behavior is cooperative.         Thought Content: Thought content normal.        Result Review :   The following data was reviewed by: GAGAN Francisco on 01/05/2023:    Data reviewed: Radiologic studies 1/5/2023 - chest x-ray     Assessment and Plan    Diagnoses and all orders for this visit:    1. Shortness of breath (Primary)  -     XR Chest PA & Lateral (In Office)  -     methylPREDNISolone (MEDROL) 4 MG dose pack; Take as directed on package instructions.  Dispense: 21 each; Refill: 0         I spent 20 minutes caring for Alejo on this date of service. This time includes time spent by me in the following activities:preparing for the visit, reviewing tests, obtaining and/or reviewing a separately obtained history, performing a medically appropriate examination and/or evaluation , counseling and educating the patient/family/caregiver, ordering medications, tests, or procedures, documenting information in the medical record and independently interpreting results and communicating that information with the patient/family/caregiver     Follow Up     Pt educated to go to ER immediately with any worsening chest pain or shortness  of breath. Pt verbalizes understanding.    Pt to f/u with established cardiologist.    Patient was given instructions and counseling regarding his condition or for health maintenance advice. Please see specific information pulled into the AVS if appropriate.       This document has been electronically signed by GAGAN Francisco  January 5, 2023 13:12 EST

## 2023-01-12 DIAGNOSIS — F41.9 ANXIETY: ICD-10-CM

## 2023-01-12 RX ORDER — TRAZODONE HYDROCHLORIDE 50 MG/1
50 TABLET ORAL NIGHTLY
Qty: 90 TABLET | Refills: 0 | Status: SHIPPED | OUTPATIENT
Start: 2023-01-12

## 2023-01-24 NOTE — TELEPHONE ENCOUNTER
LVM to sched follow up appt   ulceration to palmar aspect of distal right great toe with cellulitis, +NVI, no drainage, no streaking

## 2023-02-03 DIAGNOSIS — I10 ESSENTIAL HYPERTENSION: ICD-10-CM

## 2023-02-03 DIAGNOSIS — I50.22 CHRONIC SYSTOLIC CONGESTIVE HEART FAILURE: ICD-10-CM

## 2023-02-03 RX ORDER — LOSARTAN POTASSIUM 50 MG/1
TABLET ORAL
Qty: 90 TABLET | Refills: 1 | Status: SHIPPED | OUTPATIENT
Start: 2023-02-03

## 2023-04-01 DIAGNOSIS — I50.22 CHRONIC SYSTOLIC CONGESTIVE HEART FAILURE: ICD-10-CM

## 2023-04-01 DIAGNOSIS — I10 ESSENTIAL HYPERTENSION: ICD-10-CM

## 2023-04-01 RX ORDER — CARVEDILOL 6.25 MG/1
TABLET ORAL
Qty: 180 TABLET | Refills: 0 | Status: SHIPPED | OUTPATIENT
Start: 2023-04-01

## 2023-04-04 DIAGNOSIS — I10 ESSENTIAL HYPERTENSION: ICD-10-CM

## 2023-04-04 DIAGNOSIS — I50.22 CHRONIC SYSTOLIC CONGESTIVE HEART FAILURE: ICD-10-CM

## 2023-04-04 RX ORDER — SPIRONOLACTONE 25 MG/1
TABLET ORAL
Qty: 30 TABLET | Refills: 3 | OUTPATIENT
Start: 2023-04-04

## 2023-04-18 ENCOUNTER — OFFICE VISIT (OUTPATIENT)
Dept: FAMILY MEDICINE CLINIC | Facility: CLINIC | Age: 63
End: 2023-04-18
Payer: MEDICARE

## 2023-04-18 VITALS
BODY MASS INDEX: 33.56 KG/M2 | RESPIRATION RATE: 18 BRPM | HEART RATE: 59 BPM | DIASTOLIC BLOOD PRESSURE: 86 MMHG | TEMPERATURE: 97.3 F | WEIGHT: 226.6 LBS | SYSTOLIC BLOOD PRESSURE: 134 MMHG | HEIGHT: 69 IN | OXYGEN SATURATION: 92 %

## 2023-04-18 DIAGNOSIS — H91.90 HEARING LOSS, UNSPECIFIED HEARING LOSS TYPE, UNSPECIFIED LATERALITY: ICD-10-CM

## 2023-04-18 DIAGNOSIS — M81.0 AGE-RELATED OSTEOPOROSIS WITHOUT CURRENT PATHOLOGICAL FRACTURE: ICD-10-CM

## 2023-04-18 DIAGNOSIS — F41.9 ANXIETY: ICD-10-CM

## 2023-04-18 DIAGNOSIS — Z00.00 ENCOUNTER FOR SUBSEQUENT ANNUAL WELLNESS VISIT (AWV) IN MEDICARE PATIENT: Primary | ICD-10-CM

## 2023-04-18 DIAGNOSIS — I50.22 CHRONIC SYSTOLIC CONGESTIVE HEART FAILURE: ICD-10-CM

## 2023-04-18 DIAGNOSIS — I50.21 ACUTE SYSTOLIC CHF (CONGESTIVE HEART FAILURE): ICD-10-CM

## 2023-04-18 DIAGNOSIS — M19.90 ARTHRITIS: ICD-10-CM

## 2023-04-18 DIAGNOSIS — I10 ESSENTIAL HYPERTENSION: ICD-10-CM

## 2023-04-18 DIAGNOSIS — R79.9 ABNORMAL FINDING OF BLOOD CHEMISTRY, UNSPECIFIED: ICD-10-CM

## 2023-04-18 DIAGNOSIS — Z00.00 ANNUAL PHYSICAL EXAM: ICD-10-CM

## 2023-04-18 DIAGNOSIS — Z00.00 ENCOUNTER FOR MEDICAL EXAMINATION TO ESTABLISH CARE: ICD-10-CM

## 2023-04-18 DIAGNOSIS — I10 PRIMARY HYPERTENSION: ICD-10-CM

## 2023-04-18 PROBLEM — Z96.1 PSEUDOPHAKIA, RIGHT EYE: Status: ACTIVE | Noted: 2023-01-12

## 2023-04-18 PROBLEM — H35.371 EPIRETINAL MEMBRANE, RIGHT EYE: Status: ACTIVE | Noted: 2022-01-05

## 2023-04-18 PROBLEM — I50.9 CHF (CONGESTIVE HEART FAILURE): Status: ACTIVE | Noted: 2021-06-28

## 2023-04-18 PROBLEM — H31.421 SEROUS CHOROIDAL DETACHMENT OF RIGHT EYE: Status: ACTIVE | Noted: 2021-07-01

## 2023-04-18 PROBLEM — H50.30 INTERMITTENT EXOTROPIA, MONOCULAR: Status: ACTIVE | Noted: 2023-01-12

## 2023-04-18 PROBLEM — H72.90 TYMPANIC MEMBRANE PERFORATION: Status: ACTIVE | Noted: 2020-04-24

## 2023-04-18 PROBLEM — H25.11 AGE-RELATED NUCLEAR CATARACT OF RIGHT EYE: Status: ACTIVE | Noted: 2021-12-17

## 2023-04-18 PROBLEM — N52.9 ERECTILE DYSFUNCTION: Status: ACTIVE | Noted: 2019-01-15

## 2023-04-18 LAB
ALBUMIN SERPL-MCNC: 4.1 G/DL (ref 3.5–5.2)
ALBUMIN/GLOB SERPL: 1.5 G/DL
ALP SERPL-CCNC: 58 U/L (ref 39–117)
ALT SERPL W P-5'-P-CCNC: 13 U/L (ref 1–41)
ANION GAP SERPL CALCULATED.3IONS-SCNC: 6.4 MMOL/L (ref 5–15)
AST SERPL-CCNC: 15 U/L (ref 1–40)
BASOPHILS # BLD AUTO: 0.04 10*3/MM3 (ref 0–0.2)
BASOPHILS NFR BLD AUTO: 0.8 % (ref 0–1.5)
BILIRUB BLD-MCNC: NEGATIVE MG/DL
BILIRUB SERPL-MCNC: 0.4 MG/DL (ref 0–1.2)
BUN SERPL-MCNC: 15 MG/DL (ref 8–23)
BUN/CREAT SERPL: 15.8 (ref 7–25)
CALCIUM SPEC-SCNC: 9.8 MG/DL (ref 8.6–10.5)
CHLORIDE SERPL-SCNC: 105 MMOL/L (ref 98–107)
CHOLEST SERPL-MCNC: 223 MG/DL (ref 0–200)
CLARITY, POC: CLEAR
CO2 SERPL-SCNC: 29.6 MMOL/L (ref 22–29)
COLOR UR: YELLOW
CREAT SERPL-MCNC: 0.95 MG/DL (ref 0.76–1.27)
DEPRECATED RDW RBC AUTO: 43.8 FL (ref 37–54)
EGFRCR SERPLBLD CKD-EPI 2021: 89.4 ML/MIN/1.73
EOSINOPHIL # BLD AUTO: 0.15 10*3/MM3 (ref 0–0.4)
EOSINOPHIL NFR BLD AUTO: 2.8 % (ref 0.3–6.2)
ERYTHROCYTE [DISTWIDTH] IN BLOOD BY AUTOMATED COUNT: 12.6 % (ref 12.3–15.4)
GLOBULIN UR ELPH-MCNC: 2.7 GM/DL
GLUCOSE SERPL-MCNC: 106 MG/DL (ref 65–99)
GLUCOSE UR STRIP-MCNC: NEGATIVE MG/DL
HBA1C MFR BLD: 5.6 % (ref 4.8–5.6)
HCT VFR BLD AUTO: 43.3 % (ref 37.5–51)
HDLC SERPL-MCNC: 70 MG/DL (ref 40–60)
HGB BLD-MCNC: 14.1 G/DL (ref 13–17.7)
IMM GRANULOCYTES # BLD AUTO: 0.01 10*3/MM3 (ref 0–0.05)
IMM GRANULOCYTES NFR BLD AUTO: 0.2 % (ref 0–0.5)
KETONES UR QL: NEGATIVE
LDLC SERPL CALC-MCNC: 131 MG/DL (ref 0–100)
LDLC/HDLC SERPL: 1.82 {RATIO}
LEUKOCYTE EST, POC: NEGATIVE
LYMPHOCYTES # BLD AUTO: 1.84 10*3/MM3 (ref 0.7–3.1)
LYMPHOCYTES NFR BLD AUTO: 34.7 % (ref 19.6–45.3)
MCH RBC QN AUTO: 30.8 PG (ref 26.6–33)
MCHC RBC AUTO-ENTMCNC: 32.6 G/DL (ref 31.5–35.7)
MCV RBC AUTO: 94.5 FL (ref 79–97)
MONOCYTES # BLD AUTO: 0.44 10*3/MM3 (ref 0.1–0.9)
MONOCYTES NFR BLD AUTO: 8.3 % (ref 5–12)
NEUTROPHILS NFR BLD AUTO: 2.83 10*3/MM3 (ref 1.7–7)
NEUTROPHILS NFR BLD AUTO: 53.2 % (ref 42.7–76)
NITRITE UR-MCNC: NEGATIVE MG/ML
NRBC BLD AUTO-RTO: 0 /100 WBC (ref 0–0.2)
PH UR: 6 [PH] (ref 5–8)
PLATELET # BLD AUTO: 175 10*3/MM3 (ref 140–450)
PMV BLD AUTO: 10.5 FL (ref 6–12)
POTASSIUM SERPL-SCNC: 4.7 MMOL/L (ref 3.5–5.2)
PROT SERPL-MCNC: 6.8 G/DL (ref 6–8.5)
PROT UR STRIP-MCNC: NEGATIVE MG/DL
RBC # BLD AUTO: 4.58 10*6/MM3 (ref 4.14–5.8)
RBC # UR STRIP: NEGATIVE /UL
SODIUM SERPL-SCNC: 141 MMOL/L (ref 136–145)
SP GR UR: 1.02 (ref 1–1.03)
TRIGL SERPL-MCNC: 128 MG/DL (ref 0–150)
TSH SERPL DL<=0.05 MIU/L-ACNC: 2.26 UIU/ML (ref 0.27–4.2)
UROBILINOGEN UR QL: NORMAL
VLDLC SERPL-MCNC: 22 MG/DL (ref 5–40)
WBC NRBC COR # BLD: 5.31 10*3/MM3 (ref 3.4–10.8)

## 2023-04-18 PROCEDURE — 83036 HEMOGLOBIN GLYCOSYLATED A1C: CPT | Performed by: PSYCHOLOGIST

## 2023-04-18 PROCEDURE — 84443 ASSAY THYROID STIM HORMONE: CPT | Performed by: PSYCHOLOGIST

## 2023-04-18 PROCEDURE — 80053 COMPREHEN METABOLIC PANEL: CPT | Performed by: PSYCHOLOGIST

## 2023-04-18 PROCEDURE — 82306 VITAMIN D 25 HYDROXY: CPT | Performed by: PSYCHOLOGIST

## 2023-04-18 PROCEDURE — 85025 COMPLETE CBC W/AUTO DIFF WBC: CPT | Performed by: PSYCHOLOGIST

## 2023-04-18 PROCEDURE — 82607 VITAMIN B-12: CPT | Performed by: PSYCHOLOGIST

## 2023-04-18 PROCEDURE — 80061 LIPID PANEL: CPT | Performed by: PSYCHOLOGIST

## 2023-04-18 RX ORDER — FUROSEMIDE 40 MG/1
TABLET ORAL
Qty: 30 TABLET | Refills: 2 | Status: SHIPPED | OUTPATIENT
Start: 2023-04-18

## 2023-04-18 RX ORDER — SPIRONOLACTONE 25 MG/1
25 TABLET ORAL DAILY
Qty: 30 TABLET | Refills: 2 | Status: SHIPPED | OUTPATIENT
Start: 2023-04-18

## 2023-04-18 RX ORDER — SERTRALINE HYDROCHLORIDE 100 MG/1
100 TABLET, FILM COATED ORAL DAILY
Qty: 30 TABLET | Refills: 2 | Status: SHIPPED | OUTPATIENT
Start: 2023-04-18

## 2023-04-18 RX ORDER — TRAZODONE HYDROCHLORIDE 50 MG/1
50 TABLET ORAL NIGHTLY
Qty: 90 TABLET | Refills: 0 | Status: SHIPPED | OUTPATIENT
Start: 2023-04-18

## 2023-04-18 RX ORDER — MELOXICAM 7.5 MG/1
7.5 TABLET ORAL DAILY
Qty: 30 TABLET | Refills: 2 | Status: SHIPPED | OUTPATIENT
Start: 2023-04-18

## 2023-04-18 NOTE — ASSESSMENT & PLAN NOTE
Congestive heart failure due to coronary artery disease (CAD).  Heart failure is improving with treatment.  NYHA Class I.  Continue current treatment regimen.  Dietary sodium restriction.  Encouraged daily monitoring of the patient's weight.  Regular aerobic exercise.  Heart failure will be reassessed in 3 months.

## 2023-04-18 NOTE — PROGRESS NOTES
The ABCs of the Annual Wellness Visit  Subsequent Medicare Wellness Visit    Subjective    Alejo Condon is a 63 y.o. male who presents for a Subsequent Medicare Wellness Visit.    The following portions of the patient's history were reviewed and   updated as appropriate: allergies, current medications, past family history, past medical history, past social history, past surgical history and problem list.    Compared to one year ago, the patient feels his physical   health is better.    Compared to one year ago, the patient feels his mental   health is the same.    Recent Hospitalizations:  He was not admitted to the hospital during the last year.       Current Medical Providers:  Patient Care Team:  Prince Hernández MD as PCP - General (Urgent Care)  Garrett Trejo MD as Consulting Physician (Cardiology)    Outpatient Medications Prior to Visit   Medication Sig Dispense Refill   • albuterol sulfate  (90 Base) MCG/ACT inhaler Inhale 2 puffs Every 4 (Four) Hours As Needed for Wheezing or Shortness of Air. 6.7 g 0   • Blood Pressure Monitoring (Blood Pressure Cuff) misc 1 Units As Needed (blood pressure checks). 1 each 0   • buprenorphine-naloxone (SUBOXONE) 8-2 MG film film PLACE 2.75 FILM UNDER TONGUE ONCE A DAY     • carvedilol (COREG) 6.25 MG tablet TAKE 1 TABLET BY MOUTH TWICE A DAY WITH MEALS 180 tablet 0   • losartan (COZAAR) 50 MG tablet TAKE 1 TABLET BY MOUTH EVERY DAY 90 tablet 1   • furosemide (LASIX) 40 MG tablet Take 1 tablet by mouth twice daily for 1 week. Then decrease to 1 tablet by mouth once daily. 30 tablet 3   • hydrOXYzine (ATARAX) 50 MG tablet Take 1 tablet by mouth Every 6 (Six) Hours As Needed for Itching or Anxiety. 30 tablet 3   • meloxicam (MOBIC) 7.5 MG tablet Take 1 tablet by mouth Daily. 30 tablet 3   • sertraline (ZOLOFT) 100 MG tablet Take 1 tablet by mouth Daily. 30 tablet 3   • spironolactone (Aldactone) 25 MG tablet Take 1 tablet by mouth Daily. 30 tablet 3   •  traZODone (DESYREL) 50 MG tablet Take 1 tablet by mouth Every Night. 90 tablet 0   • methylPREDNISolone (MEDROL) 4 MG dose pack Take as directed on package instructions. (Patient not taking: Reported on 4/18/2023) 21 each 0   • omeprazole-sodium bicarbonate (ZEGERID)  MG per capsule Take 1 capsule by mouth Every Morning Before Breakfast. (Patient not taking: Reported on 4/18/2023)     • ondansetron ODT (ZOFRAN-ODT) 8 MG disintegrating tablet Place 1 tablet on the tongue Every 8 (Eight) Hours As Needed for Nausea or Vomiting. (Patient not taking: Reported on 4/18/2023) 60 tablet 5   • potassium chloride (K-DUR,KLOR-CON) 20 MEQ CR tablet Take 1 tablet by mouth Daily. (Patient not taking: Reported on 4/18/2023) 90 tablet 1     No facility-administered medications prior to visit.       Opioid medication/s are on active medication list.  and I have evaluated his active treatment plan and pain score trends (see table).  There were no vitals filed for this visit.  I have reviewed the chart for potential of high risk medication and harmful drug interactions in the elderly.            Aspirin is not on active medication list.  Aspirin use is not indicated based on review of current medical condition/s. Risk of harm outweighs potential benefits.  .    Patient Active Problem List   Diagnosis   • Hypertension   • Hyperlipidemia LDL goal <100   • Acute systolic CHF (congestive heart failure) (HCC)   • Alcohol abuse   • Tobacco abuse   • Age-related nuclear cataract of right eye   • Allergy   • Benign essential hypertension   • CHF (congestive heart failure)   • Colon polyp   • Diverticulitis   • Elevated hemoglobin A1c   • Erectile dysfunction   • Intermittent exotropia, monocular   • Irritable bowel syndrome (IBS)   • Lumbar degenerative disc disease   • Posterior tibial tendinitis of right lower extremity   • Pseudophakia, right eye   • Epiretinal membrane, right eye   • Serous choroidal detachment of right eye   •  "Tympanic membrane perforation     Advance Care Planning   Advance Care Planning     Advance Directive is not on file.  ACP discussion was held with the patient during this visit. Patient has an advance directive (not in EMR), copy requested.     Objective    Vitals:    23 1036   BP: 134/86   BP Location: Left arm   Patient Position: Sitting   Cuff Size: Adult   Pulse: 59   Resp: 18   Temp: 97.3 °F (36.3 °C)   TempSrc: Temporal   SpO2: 92%   Weight: 103 kg (226 lb 9.6 oz)   Height: 175.3 cm (69\")     Estimated body mass index is 33.46 kg/m² as calculated from the following:    Height as of this encounter: 175.3 cm (69\").    Weight as of this encounter: 103 kg (226 lb 9.6 oz).    BMI is >= 30 and <35. (Class 1 Obesity). The following options were offered after discussion;: exercise counseling/recommendations and nutrition counseling/recommendations      Does the patient have evidence of cognitive impairment? No  MINI MENTAL STATUS EXAM ACE III SCORE: 22        HEALTH RISK ASSESSMENT    Smoking Status:  Social History     Tobacco Use   Smoking Status Every Day   • Packs/day: 0.50   • Years: 20.00   • Pack years: 10.00   • Types: Cigarettes   Smokeless Tobacco Never   Tobacco Comments    on and off since he was 6 years old     Alcohol Consumption:  Social History     Substance and Sexual Activity   Alcohol Use Yes    Comment: \"every other day\"     Fall Risk Screen:    JULIUS Fall Risk Assessment has not been completed.    Depression Screenin/18/2023    10:40 AM   PHQ-2/PHQ-9 Depression Screening   Little Interest or Pleasure in Doing Things 1-->several days   Feeling Down, Depressed or Hopeless 1-->several days   Trouble Falling or Staying Asleep, or Sleeping Too Much 0-->not at all   Feeling Tired or Having Little Energy 1-->several days   Poor Appetite or Overeating 0-->not at all   Feeling Bad about Yourself - or that You are a Failure or Have Let Yourself or Your Family Down 3-->nearly every day "   Trouble Concentrating on Things, Such as Reading the Newspaper or Watching Television 2-->more than half the days   Moving or Speaking So Slowly that Other People Could Have Noticed? Or the Opposite - Being So Fidgety 3-->nearly every day   Thoughts that You Would be Better Off Dead or of Hurting Yourself in Some Way 0-->not at all   PHQ-9: Brief Depression Severity Measure Score 11   If You Checked Off Any Problems, How Difficult Have These Problems Made It For You to Do Your Work, Take Care of Things at Home, or Get Along with Other People? somewhat difficult       Health Habits and Functional and Cognitive Screenin/18/2023    10:00 AM   Functional & Cognitive Status   Do you have difficulty preparing food and eating? No   Do you have difficulty bathing yourself, getting dressed or grooming yourself? No   Do you have difficulty using the toilet? No   Do you have difficulty moving around from place to place? No   Do you have trouble with steps or getting out of a bed or a chair? No   Current Diet Well Balanced Diet   Dental Exam Not up to date   Eye Exam Up to date   Exercise (times per week) 7 times per week   Current Exercises Include House Cleaning;Gardening;Yard Work   Do you need help using the phone?  No   Are you deaf or do you have serious difficulty hearing?  No   Do you need help with transportation? No   Do you need help shopping? No   Do you need help preparing meals?  No   Do you need help with housework?  No   Do you need help with laundry? No   Do you need help taking your medications? No   Do you need help managing money? No   Do you ever drive or ride in a car without wearing a seat belt? Yes   Have you felt unusual stress, anger or loneliness in the last month? Yes   Who do you live with? Spouse   If you need help, do you have trouble finding someone available to you? No   Have you been bothered in the last four weeks by sexual problems? Yes   Do you have difficulty concentrating,  remembering or making decisions? No       Age-appropriate Screening Schedule:  Refer to the list below for future screening recommendations based on patient's age, sex and/or medical conditions. Orders for these recommended tests are listed in the plan section. The patient has been provided with a written plan.    Health Maintenance   Topic Date Due   • DXA SCAN  Never done   • HEPATITIS C SCREENING  04/18/2023 (Originally 4/27/2020)   • Pneumococcal Vaccine 0-64 (2 - PCV) 08/21/2023 (Originally 9/14/2019)   • INFLUENZA VACCINE  08/01/2023   • LIPID PANEL  12/20/2023   • ANNUAL WELLNESS VISIT  04/18/2024   • COLORECTAL CANCER SCREENING  10/03/2026   • TDAP/TD VACCINES (3 - Td or Tdap) 06/20/2027   • COVID-19 Vaccine  Discontinued   • ZOSTER VACCINE  Discontinued                  CMS Preventative Services Quick Reference  Risk Factors Identified During Encounter  Alcohol Misuse: Patient encouraged to limit alcohol use to no more than 1 standard alcoholic beverage per day. (12 ounce beer, 6 ounce wine, one shot liquor) and PATIENT REPROTS HE QUIT 1 WEEK -- HE DRINKS 6 PACK PER DAY OR MORE  Chronic Pain: Natural history and expected course discussed. Questions answered.  Depression/Dysphoria: Current medication is effective, no change recommended  Drug Use/Abuse Identified or Suspected: HE IS CURRENTLY TAKING SUBOXONE -- SINCE LAST JUNE HE WAS TAKING OXY/FENTANYL/COCAIN  Fall Risk-High or Moderate: Discussed Fall Prevention in the home  Glaucoma or Family History of Glaucoma:  Ophthalmology Appointment Recommended  Hearing Problem: Referral to Audiologist ordered and PATIENT REPORTS HE WILL CALL  Immunizations Discussed/Encouraged: Tdap, Hepatitis A Vaccine/Series, Hepatitis B Vaccine/Series, Influenza, Pneumococcal 23, Prevnar 20 (Pneumococcal 20-valent conjugate), Vaxneuvance (Pneumococcal 15-valent conjugate), Shingrix and COVID19  Polypharmacy: Medication List reviewed  Tobacco Use/Dependance Risk (use dotphrase  .tobaccocessation for documentation)  Dental Screening Recommended  Vision Screening Recommended  The above risks/problems have been discussed with the patient.  Pertinent information has been shared with the patient in the After Visit Summary.  An After Visit Summary and PPPS were made available to the patient.    Follow Up:   Next Medicare Wellness visit to be scheduled in 1 year.       Additional E&M Note during same encounter follows:  Patient has multiple medical problems which are significant and separately identifiable that require additional work above and beyond the Medicare Wellness Visit.      Chief Complaint  Medicare Wellness-subsequent    Subjective        Hypertension  This is a chronic problem. The current episode started more than 1 year ago. The problem has been resolved since onset. The problem is controlled. Associated symptoms include anxiety. Pertinent negatives include no chest pain, headaches, palpitations or shortness of breath. Risk factors for coronary artery disease include obesity, male gender and dyslipidemia. Past treatments include lifestyle changes and calcium channel blockers. Current antihypertension treatment includes lifestyle changes, diuretics and calcium channel blockers. The current treatment provides moderate improvement. There are no compliance problems.  There is no history of angina, kidney disease or CAD/MI. There is no history of chronic renal disease or a thyroid problem.   Congestive Heart Failure  Presents for initial visit. Pertinent negatives include no chest pain, palpitations or shortness of breath. The symptoms have been improving. Past treatments include vasodilators, salt and fluid restriction and beta blockers. The treatment provided moderate relief. Compliance with prior treatments has been good. His past medical history is significant for CAD.   Anxiety  Presents for initial visit. Onset was more than 5 years ago. The problem has been waxing and waning.  "Patient reports no chest pain, decreased concentration, depressed mood, nervous/anxious behavior, palpitations, shortness of breath or suicidal ideas. Symptoms occur occasionally. The severity of symptoms is mild. The patient sleeps 7 hours per night. The quality of sleep is good. Nighttime awakenings: one to two.     His past medical history is significant for CAD and CHF. Past treatments include lifestyle changes and SSRIs. Compliance with prior treatments has been good.     Alejo Condon is also being seen today for 1. ESTABLISH CARE AS HIS PCP 2. ANNUAL PHYSICAL  3. MED REFILL 4. FASTING LABS    Review of Systems   Respiratory: Negative for shortness of breath.    Cardiovascular: Negative for chest pain and palpitations.   Psychiatric/Behavioral: Negative for decreased concentration, suicidal ideas and depressed mood. The patient is not nervous/anxious.        Objective   Vital Signs:  /86 (BP Location: Left arm, Patient Position: Sitting, Cuff Size: Adult)   Pulse 59   Temp 97.3 °F (36.3 °C) (Temporal)   Resp 18   Ht 175.3 cm (69\")   Wt 103 kg (226 lb 9.6 oz)   SpO2 92%   BMI 33.46 kg/m²     Physical Exam  Vitals and nursing note reviewed. Exam conducted with a chaperone present.   Constitutional:       General: He is awake.      Appearance: Normal appearance. He is well-developed and well-groomed. He is obese.   HENT:      Head: Normocephalic and atraumatic.      Jaw: There is normal jaw occlusion.      Right Ear: Hearing, tympanic membrane, ear canal and external ear normal. There is impacted cerumen.      Left Ear: Hearing, ear canal and external ear normal. Tympanic membrane is perforated.      Nose: Nose normal.      Mouth/Throat:      Lips: Pink.      Mouth: Mucous membranes are moist.      Pharynx: Oropharynx is clear.   Eyes:      General: Lids are normal. Vision grossly intact. Gaze aligned appropriately.      Extraocular Movements: Extraocular movements intact.      Conjunctiva/sclera: " Conjunctivae normal.      Pupils: Pupils are equal, round, and reactive to light.   Neck:      Trachea: Trachea and phonation normal.   Cardiovascular:      Rate and Rhythm: Normal rate and regular rhythm.      Pulses: Normal pulses.      Heart sounds: Normal heart sounds.   Pulmonary:      Effort: Pulmonary effort is normal.      Breath sounds: Normal breath sounds and air entry.   Abdominal:      General: Abdomen is protuberant. Bowel sounds are normal.      Palpations: Abdomen is soft.   Genitourinary:     Rectum: Normal.   Musculoskeletal:         General: Normal range of motion.      Right shoulder: Normal.      Left shoulder: Normal.      Right upper arm: Normal.      Left upper arm: Normal.      Right elbow: Normal.      Left elbow: Normal.      Right forearm: Normal.      Left forearm: Normal.      Right wrist: Normal.      Left wrist: Normal.      Right hand: Normal.      Left hand: Normal.      Cervical back: Normal, full passive range of motion without pain, normal range of motion and neck supple.      Thoracic back: Normal.      Lumbar back: Normal.      Right hip: Normal.      Left hip: Normal.      Right upper leg: Normal.      Left upper leg: Normal.      Right knee: Normal.      Left knee: Normal.      Right lower leg: Normal. No edema.      Left lower leg: Normal. No edema.      Right ankle: Normal.      Right Achilles Tendon: Normal.      Left ankle: Normal.      Left Achilles Tendon: Normal.      Right foot: Normal.      Left foot: Normal.   Lymphadenopathy:      Cervical: No cervical adenopathy.   Skin:     General: Skin is warm.      Capillary Refill: Capillary refill takes less than 2 seconds.   Neurological:      General: No focal deficit present.      Mental Status: He is alert and oriented to person, place, and time.      Cranial Nerves: Cranial nerves 2-12 are intact.      Sensory: Sensation is intact.      Motor: Motor function is intact.      Coordination: Coordination is intact.       Gait: Gait is intact.      Deep Tendon Reflexes: Reflexes are normal and symmetric.   Psychiatric:         Attention and Perception: Attention and perception normal.         Mood and Affect: Mood and affect normal.         Speech: Speech normal.         Behavior: Behavior normal. Behavior is cooperative.         Thought Content: Thought content normal.         Cognition and Memory: Cognition and memory normal.         Judgment: Judgment normal.          The following data was reviewed by: Prince Hernández MD on 04/18/2023:  Common labs        12/20/2022    10:23   Common Labs   Glucose 94     BUN 11     Creatinine 0.87     Sodium 142     Potassium 4.5     Chloride 104     Calcium 9.0     Albumin 4.30     Total Bilirubin 0.4     Alkaline Phosphatase 70     AST (SGOT) 17     ALT (SGPT) 16     WBC 5.91     Hemoglobin 14.0     Hematocrit 42.5     Platelets 188     Total Cholesterol 266     Triglycerides 162     HDL Cholesterol 77     LDL Cholesterol  160       Data reviewed: Radiologic studies 1/5/2023 CXR           Assessment and Plan   Diagnoses and all orders for this visit:    1. Encounter for subsequent annual wellness visit (AWV) in Medicare patient (Primary)    2. Encounter for medical examination to establish care    3. Annual physical exam  -     CBC & Differential  -     Comprehensive Metabolic Panel  -     Hemoglobin A1c  -     Lipid Panel  -     TSH  -     Vitamin B12  -     Vitamin D,25-Hydroxy  -     POC Urinalysis Dipstick    4. Anxiety  -     traZODone (DESYREL) 50 MG tablet; Take 1 tablet by mouth Every Night.  Dispense: 90 tablet; Refill: 0  -     sertraline (ZOLOFT) 100 MG tablet; Take 1 tablet by mouth Daily.  Dispense: 30 tablet; Refill: 2  -     CBC & Differential  -     Comprehensive Metabolic Panel  -     Hemoglobin A1c  -     Lipid Panel  -     TSH  -     Vitamin B12  -     Vitamin D,25-Hydroxy  -     POC Urinalysis Dipstick    5. Essential hypertension  -     spironolactone (Aldactone) 25  MG tablet; Take 1 tablet by mouth Daily.  Dispense: 30 tablet; Refill: 2  -     furosemide (LASIX) 40 MG tablet; Take 1 tablet by mouth twice daily for 1 week. Then decrease to 1 tablet by mouth once daily.  Dispense: 30 tablet; Refill: 2  -     CBC & Differential  -     Comprehensive Metabolic Panel  -     Hemoglobin A1c  -     Lipid Panel  -     TSH  -     Vitamin B12  -     Vitamin D,25-Hydroxy  -     POC Urinalysis Dipstick    6. Chronic systolic congestive heart failure  Assessment & Plan:  Congestive heart failure due to coronary artery disease (CAD).  Heart failure is improving with treatment.  NYHA Class I.  Continue current treatment regimen.  Dietary sodium restriction.  Encouraged daily monitoring of the patient's weight.  Regular aerobic exercise.  Heart failure will be reassessed in 3 months.    Orders:  -     spironolactone (Aldactone) 25 MG tablet; Take 1 tablet by mouth Daily.  Dispense: 30 tablet; Refill: 2  -     furosemide (LASIX) 40 MG tablet; Take 1 tablet by mouth twice daily for 1 week. Then decrease to 1 tablet by mouth once daily.  Dispense: 30 tablet; Refill: 2    7. Arthritis  -     meloxicam (MOBIC) 7.5 MG tablet; Take 1 tablet by mouth Daily.  Dispense: 30 tablet; Refill: 2    8. Acute systolic CHF (congestive heart failure) (HCC)    9. Primary hypertension  Assessment & Plan:  Hypertension is improving with treatment.  Continue current treatment regimen.  Dietary sodium restriction.  Weight loss.  Regular aerobic exercise.  Stop smoking.  Blood pressure will be reassessed at the next regular appointment.    Orders:  -     CBC & Differential  -     Comprehensive Metabolic Panel  -     Hemoglobin A1c  -     Lipid Panel  -     TSH  -     Vitamin B12  -     Vitamin D,25-Hydroxy  -     POC Urinalysis Dipstick    10. Abnormal finding of blood chemistry, unspecified  -     Hemoglobin A1c    11. Age-related osteoporosis without current pathological fracture  -     Vitamin D,25-Hydroxy    12.  Hearing loss, unspecified hearing loss type, unspecified laterality  -     Ambulatory Referral to ENT (Otolaryngology)         I spent 26 minutes caring for Alejo on this date of service. This time includes time spent by me in the following activities:reviewing tests, performing a medically appropriate examination and/or evaluation , counseling and educating the patient/family/caregiver, ordering medications, tests, or procedures and documenting information in the medical record     The preventive exam has been reviewed in detail.  The patient has been fully counseled on preventative guidelines for vaccines, cancer screenings, and other health maintenance needs.   The patient has been counseled on guidelines for maintaining a lifestyle to promote good health and to minimize chronic diseases.  The patient has been assisted with scheduling these healthcare procedures for the coming year and given a written document of health maintenance and anticipatory guidance for age with the AVS.       Follow Up   Return in about 3 months (around 7/18/2023) for Recheck, RTC FASTING LABS ( CHRONIC ILLNESS/MED REFILL) & 2 WEEKS FF/UP HERNIA/ IRRIGATION RIGHT EAR.  Patient was given instructions and counseling regarding his condition or for health maintenance advice. Please see specific information pulled into the AVS if appropriate.           This document has been electronically signed by Prince Hernández MD  April 18, 2023 11:37 EDT

## 2023-04-19 LAB
25(OH)D3 SERPL-MCNC: 40 NG/ML (ref 30–100)
VIT B12 BLD-MCNC: 464 PG/ML (ref 211–946)

## 2023-04-24 DIAGNOSIS — F41.9 ANXIETY: ICD-10-CM

## 2023-04-24 RX ORDER — TRAZODONE HYDROCHLORIDE 50 MG/1
TABLET ORAL
Qty: 90 TABLET | Refills: 0 | OUTPATIENT
Start: 2023-04-24

## 2023-04-25 DIAGNOSIS — I10 ESSENTIAL HYPERTENSION: ICD-10-CM

## 2023-04-25 DIAGNOSIS — I50.22 CHRONIC SYSTOLIC CONGESTIVE HEART FAILURE: ICD-10-CM

## 2023-04-25 RX ORDER — FUROSEMIDE 40 MG/1
TABLET ORAL
Qty: 90 TABLET | Refills: 1 | OUTPATIENT
Start: 2023-04-25

## 2023-05-03 ENCOUNTER — OFFICE VISIT (OUTPATIENT)
Dept: FAMILY MEDICINE CLINIC | Facility: CLINIC | Age: 63
End: 2023-05-03
Payer: MEDICARE

## 2023-05-03 VITALS
BODY MASS INDEX: 34.8 KG/M2 | WEIGHT: 235 LBS | HEART RATE: 74 BPM | SYSTOLIC BLOOD PRESSURE: 122 MMHG | OXYGEN SATURATION: 97 % | DIASTOLIC BLOOD PRESSURE: 78 MMHG | RESPIRATION RATE: 18 BRPM | TEMPERATURE: 97.6 F | HEIGHT: 69 IN

## 2023-05-03 DIAGNOSIS — H61.23 IMPACTED CERUMEN OF BOTH EARS: ICD-10-CM

## 2023-05-03 DIAGNOSIS — R53.83 OTHER FATIGUE: ICD-10-CM

## 2023-05-03 DIAGNOSIS — H92.03 OTALGIA OF BOTH EARS: ICD-10-CM

## 2023-05-03 DIAGNOSIS — R06.2 WHEEZING: ICD-10-CM

## 2023-05-03 DIAGNOSIS — K45.8 OTHER SPECIFIED ABDOMINAL HERNIA WITHOUT OBSTRUCTION OR GANGRENE: Primary | ICD-10-CM

## 2023-05-03 PROBLEM — Z98.890 H/O VITRECTOMY: Status: ACTIVE | Noted: 2021-07-01

## 2023-05-03 RX ORDER — DEXAMETHASONE SODIUM PHOSPHATE 4 MG/ML
8 INJECTION, SOLUTION INTRA-ARTICULAR; INTRALESIONAL; INTRAMUSCULAR; INTRAVENOUS; SOFT TISSUE ONCE
Status: DISCONTINUED | OUTPATIENT
Start: 2023-05-03 | End: 2023-05-03

## 2023-05-03 RX ORDER — DEXAMETHASONE SODIUM PHOSPHATE 4 MG/ML
8 INJECTION, SOLUTION INTRA-ARTICULAR; INTRALESIONAL; INTRAMUSCULAR; INTRAVENOUS; SOFT TISSUE ONCE
Status: COMPLETED | OUTPATIENT
Start: 2023-05-03 | End: 2023-05-03

## 2023-05-03 RX ORDER — CYANOCOBALAMIN 1000 UG/ML
1000 INJECTION, SOLUTION INTRAMUSCULAR; SUBCUTANEOUS
Status: SHIPPED | OUTPATIENT
Start: 2023-05-03

## 2023-05-03 RX ORDER — AZITHROMYCIN 250 MG/1
TABLET, FILM COATED ORAL
Qty: 6 TABLET | Refills: 1 | Status: SHIPPED | OUTPATIENT
Start: 2023-05-03

## 2023-05-03 RX ADMIN — DEXAMETHASONE SODIUM PHOSPHATE 8 MG: 4 INJECTION, SOLUTION INTRA-ARTICULAR; INTRALESIONAL; INTRAMUSCULAR; INTRAVENOUS; SOFT TISSUE at 11:35

## 2023-05-03 RX ADMIN — CYANOCOBALAMIN 1000 MCG: 1000 INJECTION, SOLUTION INTRAMUSCULAR; SUBCUTANEOUS at 11:34

## 2023-05-03 NOTE — PROGRESS NOTES
Chief Complaint  Follow-up (2 WEEKS FF/UP HERNIA/ IRRIGATION RIGHT EAR.)    Subjective        Alejo Condon presents to [facility] PRIMARY [facility]   C/O FOLLOW UP CHRONIC ILLNESS 2. ABDOMINAL PAIN 2. ACUTE MEDICAL ILLNESS    HEARING LOSS/ EAR PAIN & WHEEZING DENIES COPD   Abdominal Pain  This is a chronic problem. The current episode started more than 1 year ago. The onset quality is gradual. The problem occurs constantly. The problem has been gradually worsening. The pain is at a severity of 7/10. The pain is moderate. Pertinent negatives include no constipation, fever, frequency or headaches. He has tried nothing for the symptoms. The treatment provided no relief.   Wheezing   This is a new problem. The current episode started in the past 7 days. The problem occurs intermittently. The problem has been waxing and waning. Associated symptoms include abdominal pain, ear pain and shortness of breath. Pertinent negatives include no fever or headaches. He has tried nothing for the symptoms. There is no history of asthma or COPD.   Earache   There is pain in both ears. This is a new problem. The current episode started 1 to 4 weeks ago. The problem occurs every few minutes. The problem has been waxing and waning. There has been no fever. The pain is at a severity of 5/10. The pain is mild. Associated symptoms include abdominal pain. Pertinent negatives include no headaches. He has tried nothing for the symptoms.   Fatigue  This is a recurrent problem. The current episode started more than 1 month ago. The problem occurs constantly. The problem has been gradually worsening. Associated symptoms include abdominal pain and fatigue. Pertinent negatives include no fever or headaches. He has tried nothing for the symptoms.       Objective   Vital Signs:  /78 (BP Location: Left arm, Patient Position: Sitting, Cuff Size: Adult)   Pulse 74   Temp 97.6 °F (36.4 °C) (Temporal)   Resp 18   Ht 175.3 cm  "(69\")   Wt 107 kg (235 lb)   SpO2 97%   BMI 34.70 kg/m²   Estimated body mass index is 34.7 kg/m² as calculated from the following:    Height as of this encounter: 175.3 cm (69\").    Weight as of this encounter: 107 kg (235 lb).    BMI is >= 30 and <35. (Class 1 Obesity). The following options were offered after discussion;: exercise counseling/recommendations and nutrition counseling/recommendations      Physical Exam  Vitals and nursing note reviewed.   Constitutional:       Appearance: Normal appearance. He is obese.   HENT:      Head: Normocephalic.      Right Ear: Tympanic membrane normal. There is impacted cerumen.      Left Ear: Tympanic membrane normal. There is impacted cerumen (MINIMAL).      Nose: Nose normal.      Mouth/Throat:      Mouth: Mucous membranes are moist.   Eyes:      Extraocular Movements: Extraocular movements intact.      Pupils: Pupils are equal, round, and reactive to light.   Cardiovascular:      Rate and Rhythm: Normal rate and regular rhythm.      Pulses: Normal pulses.      Heart sounds: Normal heart sounds.   Pulmonary:      Effort: Pulmonary effort is normal.      Breath sounds: Examination of the right-lower field reveals wheezing. Examination of the left-lower field reveals wheezing. Wheezing present.   Abdominal:      General: Abdomen is protuberant. Bowel sounds are normal.      Palpations: Abdomen is soft.      Tenderness: There is abdominal tenderness. There is no guarding or rebound.       Musculoskeletal:         General: Normal range of motion.      Cervical back: Normal range of motion and neck supple.   Skin:     General: Skin is warm.      Capillary Refill: Capillary refill takes less than 2 seconds.   Neurological:      General: No focal deficit present.      Mental Status: He is alert and oriented to person, place, and time.   Psychiatric:         Mood and Affect: Mood normal.        Result Review :  The following data was reviewed by: Prince Hernández MD on " 05/03/2023:  Common labs        12/20/2022    10:23 4/18/2023    11:57   Common Labs   Glucose 94   106     BUN 11   15     Creatinine 0.87   0.95     Sodium 142   141     Potassium 4.5   4.7     Chloride 104   105     Calcium 9.0   9.8     Albumin 4.30   4.1     Total Bilirubin 0.4   0.4     Alkaline Phosphatase 70   58     AST (SGOT) 17   15     ALT (SGPT) 16   13     WBC 5.91   5.31     Hemoglobin 14.0   14.1     Hematocrit 42.5   43.3     Platelets 188   175     Total Cholesterol 266   223     Triglycerides 162   128     HDL Cholesterol 77   70     LDL Cholesterol  160   131     Hemoglobin A1C  5.60       Data reviewed: Radiologic studies 1/5/23 CXR      Ear Cerumen Removal    Date/Time: 5/3/2023 11:04 AM  Performed by: Prince Hernández MD  Authorized by: Prince Hernández MD     Anesthesia:  Local Anesthetic: none  Ceruminolytics applied: Ceruminolytics applied prior to the procedure.  Location details: right ear  Patient tolerance: patient tolerated the procedure well with no immediate complications  Comments: BOTH EARS TM GOOD AFTER IRRIGATION   Procedure type: irrigation   Sedation:  Patient sedated: no             Assessment and Plan   Diagnoses and all orders for this visit:    1. Other specified abdominal hernia without obstruction or gangrene (Primary)  -     Ambulatory Referral to Gastroenterology    2. Wheezing  -     dexamethasone (DECADRON) injection 8 mg    3. Otalgia of both ears  Comments:  HE REPORTS POPPING AND MUFFLED HEARING    4. Impacted cerumen of both ears    5. Other fatigue  -     cyanocobalamin injection 1,000 mcg    Other orders  -     Ear Cerumen Removal  -     azithromycin (Zithromax) 250 MG tablet; Take 2 tablets the first day, then 1 tablet daily for 4 days.  Dispense: 6 tablet; Refill: 1          I spent 20 minutes caring for Alejo on this date of service. This time includes time spent by me in the following activities:reviewing tests, performing a medically appropriate  examination and/or evaluation , counseling and educating the patient/family/caregiver, ordering medications, tests, or procedures and documenting information in the medical record       Follow Up   No follow-ups on file.  Patient was given instructions and counseling regarding his condition or for health maintenance advice. Please see specific information pulled into the AVS if appropriate.           This document has been electronically signed by Prince Hernández MD  May 3, 2023 11:09 EDT

## 2023-05-08 DIAGNOSIS — F41.9 ANXIETY: ICD-10-CM

## 2023-05-08 RX ORDER — SERTRALINE HYDROCHLORIDE 100 MG/1
100 TABLET, FILM COATED ORAL DAILY
Qty: 30 TABLET | Refills: 2 | OUTPATIENT
Start: 2023-05-08

## 2023-05-08 NOTE — TELEPHONE ENCOUNTER
Incoming Refill Request      Medication requested (name and dose):   sertraline (ZOLOFT) 100 MG tablet 100 mg, Oral, Daily           Pharmacy where request should be sent: [facility]    Additional details provided by patient: 90 DAY REFILL REQUEST    Best call back number: [phone]    Does the patient have less than a 3 day supply:  [] Yes  [] No    Lillian Hall Rep  05/08/23, 11:18 EDT

## 2023-05-09 RX ORDER — PREDNISONE 20 MG/1
20 TABLET ORAL
Qty: 10 TABLET | Refills: 0 | Status: SHIPPED | OUTPATIENT
Start: 2023-05-09 | End: 2023-05-19

## 2023-05-09 RX ORDER — HYDROXYZINE 50 MG/1
50 TABLET, FILM COATED ORAL NIGHTLY
Qty: 30 TABLET | Refills: 2 | Status: SHIPPED | OUTPATIENT
Start: 2023-05-09 | End: 2023-08-07

## 2023-05-10 NOTE — TELEPHONE ENCOUNTER
Patient requesting phenergan Be sent to [facility]. He already spoke with Dr. worthington about it.    Requesting call to let him know when it's sent in.

## 2023-05-11 RX ORDER — PROMETHAZINE HYDROCHLORIDE 12.5 MG/1
12.5 TABLET ORAL EVERY 6 HOURS PRN
Qty: 20 TABLET | Refills: 0 | Status: SHIPPED | OUTPATIENT
Start: 2023-05-11 | End: 2023-05-18

## 2023-07-25 ENCOUNTER — TELEPHONE (OUTPATIENT)
Dept: FAMILY MEDICINE CLINIC | Facility: CLINIC | Age: 63
End: 2023-07-25

## 2023-07-25 NOTE — TELEPHONE ENCOUNTER
PATIENT WOULD LIKE TO KNOW IF DEXA SCAN AND CT OF ABD HAVE BEEN SCHEDULED YET.     PLEASE CALL [phone]

## 2023-08-02 NOTE — PROGRESS NOTES
DATE OF CONSULTATION:  8/8/2023    REFERRING PHYSICIAN:  Prince Hernández MD    CHIEF COMPLAINT:  Chief Complaint   Patient presents with    abdominal hernia       HISTORY OF PRESENT ILLNESS:   Alejo Condon is a very pleasant 63 y.o. male who is being seen today at the request of Prince Hernández MD for evaluation and treatment of abdominal pain.  Patient states he experiences pain that occurs in the epigastric area as well as frequent regurgitation.  He denies any heartburn/reflux symptoms -he is currently on omeprazole 40 mg once daily which controls all symptoms.  Patient denies any difficulty swallowing.  The pain that he experiences in the epigastric region is described as a pressure/fullness.  He does experience a lot of of gas and bloating in which he has tried taking OTC Gas-X without relief.  Patient states his bowels move without issue-bowel movements typically occur daily and range from top 3-4 on the Jackson stool scale. Patient is also needing screening colonoscopy performed as well.  His last colonoscopy was between 5 to 10 years ago.  He has never had polyps removed.  He denies any family history of colon cancer.  Denies any issues with bright red blood per rectum or melena.  He denies a family history of colon cancer or GI cancers.    REVIEW OF SYSTEMS:   Review of Systems   Constitutional:  Negative for fever.   HENT:  Negative for trouble swallowing.    Eyes: Negative.    Respiratory: Negative.     Cardiovascular: Negative.    Gastrointestinal:  Positive for abdominal distention, abdominal pain and nausea. Negative for anal bleeding, blood in stool, constipation, diarrhea and vomiting.   Endocrine: Negative.    Genitourinary: Negative.    Musculoskeletal: Negative.    Skin: Negative.    Allergic/Immunologic: Negative.    Neurological: Negative.    Hematological: Negative.    Psychiatric/Behavioral: Negative.       PAST MEDICAL HISTORY:  Past Medical History:   Diagnosis Date    Acute  "systolic CHF (congestive heart failure) 5/13/2020    Echo 5/6/20 LVEF = 31.0%. Left ventricular diastolic dysfunction is noted (grade III w/high LAP) consistent with reversible restrictive pattern Left atrial volume is moderately increased.    Diverticulitis     Hyperlipidemia     Hypertension     Opiate addiction     Resolved since on suboxone       PAST SURGICAL HISTORY:  Past Surgical History:   Procedure Laterality Date    ANAL FISTULA REPAIR N/A     CARDIAC CATHETERIZATION N/A 08/25/2020    Procedure: Left Heart Cath;  Surgeon: Garrett Trejo MD;  Location: [facility] CATH INVASIVE LOCATION;  Service: Cardiovascular;  Laterality: N/A;    FINGER SURGERY      REFRACTIVE SURGERY      RETINAL DETACHMENT REPAIR  07/2021       FAMILY HISTORY:  Family History   Problem Relation Age of Onset    Hypertension Mother     Hyperlipidemia Mother     Heart failure Mother     Heart attack Father 88    Cardiomyopathy Brother 36        Patient reports he had SCD that they attributed to agent orange       SOCIAL HISTORY:  Social History     Socioeconomic History    Marital status:    Tobacco Use    Smoking status: Every Day     Packs/day: 0.50     Years: 20.00     Pack years: 10.00     Types: Cigarettes    Smokeless tobacco: Never    Tobacco comments:     on and off since he was 6 years old   Substance and Sexual Activity    Alcohol use: Yes     Comment: \"every other day\"    Drug use: Yes     Types: Marijuana     Comment: quit pills  2018 but still smokes marijuana    Sexual activity: Defer       MEDICATIONS:      Current Outpatient Medications:     albuterol sulfate  (90 Base) MCG/ACT inhaler, Inhale 2 puffs Every 4 (Four) Hours As Needed for Wheezing or Shortness of Air., Disp: 6.7 g, Rfl: 0    atorvastatin (Lipitor) 20 MG tablet, Take 1 tablet by mouth Every Night for 90 days., Disp: 30 tablet, Rfl: 2    Blood Pressure Monitoring (Blood Pressure Cuff) misc, 1 Units As Needed (blood pressure checks)., Disp: 1 " "each, Rfl: 0    buprenorphine-naloxone (SUBOXONE) 8-2 MG film film, PLACE 2.75 FILM UNDER TONGUE ONCE A DAY, Disp: , Rfl:     carvedilol (COREG) 6.25 MG tablet, Take 1 tablet by mouth 2 (Two) Times a Day With Meals., Disp: 180 tablet, Rfl: 0    furosemide (LASIX) 40 MG tablet, TAKE 1 TABLET BY MOUTH TWICE DAILY FOR 1 WEEK. THEN DECREASE TO 1 TABLET BY MOUTH ONCE DAILY., Disp: 90 tablet, Rfl: 0    losartan (COZAAR) 50 MG tablet, TAKE 1 TABLET BY MOUTH EVERY DAY, Disp: 90 tablet, Rfl: 1    meloxicam (MOBIC) 7.5 MG tablet, Take 1 tablet by mouth Daily., Disp: 30 tablet, Rfl: 2    omeprazole (priLOSEC) 40 MG capsule, Take 1 capsule by mouth Daily With Lunch for 90 days., Disp: 30 capsule, Rfl: 2    sertraline (ZOLOFT) 100 MG tablet, Take 1 tablet by mouth 2 (Two) Times a Day for 90 days., Disp: 60 tablet, Rfl: 2    spironolactone (ALDACTONE) 25 MG tablet, TAKE 1 TABLET BY MOUTH EVERY DAY, Disp: 90 tablet, Rfl: 5    traZODone (DESYREL) 50 MG tablet, TAKE 1 TABLET BY MOUTH EVERY DAY AT NIGHT, Disp: 90 tablet, Rfl: 0    polyethylene glycol (MIRALAX) 17 GM/SCOOP powder, Take 510 g by mouth Take As Directed. Place 15 cap fulls of powder in 32 oz of liquid of choice at 4:00 and 10:00 PM, Disp: 510 g, Rfl: 0    Current Facility-Administered Medications:     cyanocobalamin injection 1,000 mcg, 1,000 mcg, Intramuscular, Q28 Days, Prince Hernández MD, 1,000 mcg at 07/18/23 1046    ALLERGIES:  No Known Allergies    VISIT VITALS/PHYSICAL EXAM:  /70   Pulse 55   Ht 175.3 cm (69\")   Wt 110 kg (241 lb 12.8 oz)   BMI 35.71 kg/mý   Physical Exam  Constitutional:       General: He is not in acute distress.     Appearance: Normal appearance. He is well-developed.   HENT:      Head: Normocephalic and atraumatic.   Eyes:      Pupils: Pupils are equal, round, and reactive to light.   Pulmonary:      Effort: Pulmonary effort is normal. No respiratory distress.   Abdominal:      General: Abdomen is flat. There is distension.     "  Palpations: Abdomen is soft. There is no mass.      Tenderness: There is no abdominal tenderness. There is no guarding or rebound.      Hernia: No hernia is present.   Musculoskeletal:         General: No swelling. Normal range of motion.      Cervical back: Normal range of motion.   Skin:     General: Skin is warm and dry.   Neurological:      Mental Status: He is alert and oriented to person, place, and time.   Psychiatric:         Attention and Perception: Attention normal.         Mood and Affect: Mood normal.         Speech: Speech normal.         Behavior: Behavior normal. Behavior is cooperative.         Thought Content: Thought content normal.         PATHOLOGY:   NONE      ENDOSCOPY:  NONE      IMAGING:     No Images in the past 120 days found..     RECENT LABS:  Lab Results   Component Value Date    WBC 5.31 04/18/2023    HGB 14.1 04/18/2023    HCT 43.3 04/18/2023    MCV 94.5 04/18/2023    RDW 12.6 04/18/2023     04/18/2023    NEUTRORELPCT 53.2 04/18/2023    LYMPHORELPCT 34.7 04/18/2023    MONORELPCT 8.3 04/18/2023    EOSRELPCT 2.8 04/18/2023    BASORELPCT 0.8 04/18/2023    NEUTROABS 2.83 04/18/2023    LYMPHSABS 1.84 04/18/2023       Lab Results   Component Value Date     07/18/2023    K 4.3 07/18/2023    CO2 27.2 07/18/2023     07/18/2023    BUN 20 07/18/2023    CREATININE 1.02 07/18/2023    EGFRIFNONA 81 07/10/2021    GLUCOSE 102 (H) 07/18/2023    CALCIUM 9.0 07/18/2023    ALKPHOS 65 07/18/2023    AST 17 07/18/2023    ALT 13 07/18/2023    BILITOT 0.3 07/18/2023    ALBUMIN 4.3 07/18/2023    PROTEINTOT 6.9 07/18/2023         ASSESSMENT & PLAN:  Due to patient's epigastric pain, regurgitation and bloating-he will be scheduled for a EGD.  We will also be scheduling a screening colonoscopy for patient as well.  Both procedures were thoroughly explained to the patient and he voiced understanding and agreement to the treatment plan.   Diagnosis Plan   1. Epigastric pain  Case Request     Follow Anesthesia Guidelines / Protocol    Obtain Informed Consent    Case Request    bisacodyl (DULCOLAX) 5 MG EC tablet    polyethylene glycol (MIRALAX) 17 GM/SCOOP powder      2. Bloating  Case Request    Follow Anesthesia Guidelines / Protocol    Obtain Informed Consent    Case Request    bisacodyl (DULCOLAX) 5 MG EC tablet    polyethylene glycol (MIRALAX) 17 GM/SCOOP powder      3. Encounter for screening for malignant neoplasm of colon  Case Request    Follow Anesthesia Guidelines / Protocol    Obtain Informed Consent    Case Request    bisacodyl (DULCOLAX) 5 MG EC tablet    polyethylene glycol (MIRALAX) 17 GM/SCOOP powder          Return for after procedure follow-up.          Electronically Signed by: Roz Ivey PA-C , August 8, 2023 11:58 EDT       CC:   MD Edgar Sanchez Giovannie C, MD    Thank you so much for allowing us to participate in the care of Alejo Condon . Please do not hesitate to contact us with any questions or concerns.

## 2023-08-04 ENCOUNTER — OFFICE VISIT (OUTPATIENT)
Dept: GASTROENTEROLOGY | Facility: CLINIC | Age: 63
End: 2023-08-04
Payer: MEDICARE

## 2023-08-04 VITALS
SYSTOLIC BLOOD PRESSURE: 118 MMHG | WEIGHT: 241.8 LBS | HEART RATE: 55 BPM | DIASTOLIC BLOOD PRESSURE: 70 MMHG | HEIGHT: 69 IN | BODY MASS INDEX: 35.81 KG/M2

## 2023-08-04 DIAGNOSIS — R10.13 EPIGASTRIC PAIN: Primary | ICD-10-CM

## 2023-08-04 DIAGNOSIS — R14.0 BLOATING: ICD-10-CM

## 2023-08-04 DIAGNOSIS — Z12.11 ENCOUNTER FOR SCREENING FOR MALIGNANT NEOPLASM OF COLON: ICD-10-CM

## 2023-08-04 RX ORDER — BISACODYL 5 MG/1
20 TABLET, DELAYED RELEASE ORAL ONCE
Qty: 4 TABLET | Refills: 0 | Status: SHIPPED | OUTPATIENT
Start: 2023-08-04 | End: 2023-08-04

## 2023-08-04 RX ORDER — POLYETHYLENE GLYCOL 3350 17 G/17G
510 POWDER, FOR SOLUTION ORAL TAKE AS DIRECTED
Qty: 510 G | Refills: 0 | Status: SHIPPED | OUTPATIENT
Start: 2023-08-04

## 2023-08-11 RX ORDER — OMEPRAZOLE 40 MG/1
40 CAPSULE, DELAYED RELEASE ORAL
Qty: 90 CAPSULE | OUTPATIENT
Start: 2023-08-11 | End: 2023-11-09

## 2023-08-18 ENCOUNTER — OFFICE VISIT (OUTPATIENT)
Dept: FAMILY MEDICINE CLINIC | Facility: CLINIC | Age: 63
End: 2023-08-18
Payer: MEDICARE

## 2023-08-18 VITALS
HEIGHT: 69 IN | TEMPERATURE: 96.8 F | WEIGHT: 232 LBS | SYSTOLIC BLOOD PRESSURE: 102 MMHG | HEART RATE: 68 BPM | RESPIRATION RATE: 22 BRPM | DIASTOLIC BLOOD PRESSURE: 62 MMHG | OXYGEN SATURATION: 96 % | BODY MASS INDEX: 34.36 KG/M2

## 2023-08-18 DIAGNOSIS — E78.2 MIXED HYPERLIPIDEMIA: ICD-10-CM

## 2023-08-18 DIAGNOSIS — Z12.5 SCREENING FOR PROSTATE CANCER: ICD-10-CM

## 2023-08-18 DIAGNOSIS — R05.1 ACUTE COUGH: Primary | ICD-10-CM

## 2023-08-18 DIAGNOSIS — F41.9 ANXIETY: ICD-10-CM

## 2023-08-18 DIAGNOSIS — R79.9 ABNORMAL FINDING OF BLOOD CHEMISTRY, UNSPECIFIED: ICD-10-CM

## 2023-08-18 DIAGNOSIS — J40 BRONCHITIS: ICD-10-CM

## 2023-08-18 DIAGNOSIS — I10 PRIMARY HYPERTENSION: ICD-10-CM

## 2023-08-18 LAB
EXPIRATION DATE: NORMAL
FLUAV AG UPPER RESP QL IA.RAPID: NOT DETECTED
FLUBV AG UPPER RESP QL IA.RAPID: NOT DETECTED
INTERNAL CONTROL: NORMAL
Lab: NORMAL
SARS-COV-2 AG UPPER RESP QL IA.RAPID: NOT DETECTED

## 2023-08-18 PROCEDURE — 87428 SARSCOV & INF VIR A&B AG IA: CPT | Performed by: PSYCHOLOGIST

## 2023-08-18 PROCEDURE — 99213 OFFICE O/P EST LOW 20 MIN: CPT | Performed by: PSYCHOLOGIST

## 2023-08-18 PROCEDURE — 1159F MED LIST DOCD IN RCRD: CPT | Performed by: PSYCHOLOGIST

## 2023-08-18 PROCEDURE — 3078F DIAST BP <80 MM HG: CPT | Performed by: PSYCHOLOGIST

## 2023-08-18 PROCEDURE — 1160F RVW MEDS BY RX/DR IN RCRD: CPT | Performed by: PSYCHOLOGIST

## 2023-08-18 PROCEDURE — 3074F SYST BP LT 130 MM HG: CPT | Performed by: PSYCHOLOGIST

## 2023-08-18 RX ORDER — CEFTRIAXONE 500 MG/1
500 INJECTION, POWDER, FOR SOLUTION INTRAMUSCULAR; INTRAVENOUS ONCE
Status: COMPLETED | OUTPATIENT
Start: 2023-08-18 | End: 2023-08-18

## 2023-08-18 RX ORDER — HYDROXYZINE 50 MG/1
50 TABLET, FILM COATED ORAL NIGHTLY
COMMUNITY
End: 2023-08-18 | Stop reason: SDUPTHER

## 2023-08-18 RX ORDER — PREDNISONE 20 MG/1
20 TABLET ORAL
Qty: 10 TABLET | Refills: 0 | Status: SHIPPED | OUTPATIENT
Start: 2023-08-18 | End: 2023-08-28

## 2023-08-18 RX ORDER — SERTRALINE HYDROCHLORIDE 100 MG/1
100 TABLET, FILM COATED ORAL 2 TIMES DAILY
Qty: 180 TABLET | Refills: 0 | Status: SHIPPED | OUTPATIENT
Start: 2023-08-18 | End: 2023-11-16

## 2023-08-18 RX ORDER — DOXYCYCLINE HYCLATE 100 MG/1
100 CAPSULE ORAL 2 TIMES DAILY
Qty: 20 CAPSULE | Refills: 0 | Status: SHIPPED | OUTPATIENT
Start: 2023-08-18 | End: 2023-08-28

## 2023-08-18 RX ORDER — BENZONATATE 200 MG/1
200 CAPSULE ORAL 3 TIMES DAILY PRN
Qty: 30 CAPSULE | Refills: 0 | Status: SHIPPED | OUTPATIENT
Start: 2023-08-18 | End: 2023-08-28

## 2023-08-18 RX ORDER — DEXAMETHASONE SODIUM PHOSPHATE 4 MG/ML
8 INJECTION, SOLUTION INTRA-ARTICULAR; INTRALESIONAL; INTRAMUSCULAR; INTRAVENOUS; SOFT TISSUE ONCE
Status: COMPLETED | OUTPATIENT
Start: 2023-08-18 | End: 2023-08-18

## 2023-08-18 RX ORDER — HYDROXYZINE 50 MG/1
50 TABLET, FILM COATED ORAL NIGHTLY
Qty: 90 TABLET | Refills: 1 | Status: SHIPPED | OUTPATIENT
Start: 2023-08-18 | End: 2024-02-14

## 2023-08-18 RX ADMIN — DEXAMETHASONE SODIUM PHOSPHATE 8 MG: 4 INJECTION, SOLUTION INTRA-ARTICULAR; INTRALESIONAL; INTRAMUSCULAR; INTRAVENOUS; SOFT TISSUE at 12:15

## 2023-08-18 RX ADMIN — CEFTRIAXONE 500 MG: 500 INJECTION, POWDER, FOR SOLUTION INTRAMUSCULAR; INTRAVENOUS at 12:13

## 2023-08-18 NOTE — PROGRESS NOTES
"Chief Complaint  Cough (Thick yellow mucous x 2 weeks; girlfriend tested positive for Covid/Sinus drainage) and Follow-up (Hyperlipidemia, Arthritis, Anxiety, HTN)    Subjective        Alejo Condon presents to [facility] PRIMARY CARE  Cough  This is a new problem. The current episode started 1 to 4 weeks ago. The problem has been gradually worsening. The problem occurs every few minutes. The cough is Productive of sputum. Associated symptoms include chills, ear pain, nasal congestion, rhinorrhea, a sore throat and shortness of breath. Pertinent negatives include no fever. Risk factors for lung disease include smoking/tobacco exposure. He has tried nothing for the symptoms. There is no history of emphysema.   Anxiety  Presents for follow-up visit. Symptoms include nervous/anxious behavior, panic and shortness of breath. Patient reports no decreased concentration, depressed mood, irritability, palpitations or suicidal ideas. Symptoms occur occasionally. The severity of symptoms is mild. The patient sleeps 5 hours per night. The quality of sleep is fair. Nighttime awakenings: occasional.     Compliance with medications is %.     Objective   Vital Signs:  /62 (BP Location: Right arm, Patient Position: Sitting, Cuff Size: Adult)   Pulse 68   Temp 96.8 øF (36 øC) (Temporal)   Resp 22   Ht 175.3 cm (69\")   Wt 105 kg (232 lb)   SpO2 96%   BMI 34.26 kg/mý   Estimated body mass index is 34.26 kg/mý as calculated from the following:    Height as of this encounter: 175.3 cm (69\").    Weight as of this encounter: 105 kg (232 lb).       Physical Exam  Vitals and nursing note reviewed.   Constitutional:       Appearance: Normal appearance. He is obese.   HENT:      Head: Normocephalic.      Right Ear: Tympanic membrane normal.      Left Ear: Tympanic membrane normal.      Nose: Nose normal.      Mouth/Throat:      Mouth: Mucous membranes are moist.   Eyes:      Extraocular Movements: " Extraocular movements intact.      Pupils: Pupils are equal, round, and reactive to light.   Cardiovascular:      Rate and Rhythm: Normal rate and regular rhythm.      Pulses: Normal pulses.      Heart sounds: Normal heart sounds.   Pulmonary:      Effort: Pulmonary effort is normal.      Breath sounds: Normal breath sounds. Examination of the right-middle field reveals wheezing. Examination of the left-middle field reveals wheezing. Examination of the right-lower field reveals wheezing. Examination of the left-lower field reveals wheezing.   Abdominal:      General: Abdomen is protuberant. Bowel sounds are normal.      Palpations: Abdomen is soft.   Musculoskeletal:         General: Normal range of motion.      Cervical back: Normal range of motion and neck supple.   Skin:     General: Skin is warm.      Capillary Refill: Capillary refill takes less than 2 seconds.   Neurological:      General: No focal deficit present.      Mental Status: He is alert and oriented to person, place, and time.   Psychiatric:         Mood and Affect: Mood normal.      Result Review :  The following data was reviewed by: Prince Hernández MD on 08/18/2023:  Common labs          12/20/2022    10:23 4/18/2023    11:57 7/18/2023    11:35   Common Labs   Glucose 94  106  102    BUN 11  15  20    Creatinine 0.87  0.95  1.02    Sodium 142  141  138    Potassium 4.5  4.7  4.3    Chloride 104  105  102    Calcium 9.0  9.8  9.0    Albumin 4.30  4.1  4.3    Total Bilirubin 0.4  0.4  0.3    Alkaline Phosphatase 70  58  65    AST (SGOT) 17  15  17    ALT (SGPT) 16  13  13    WBC 5.91  5.31     Hemoglobin 14.0  14.1     Hematocrit 42.5  43.3     Platelets 188  175     Total Cholesterol 266  223  228    Triglycerides 162  128  191    HDL Cholesterol 77  70  65    LDL Cholesterol  160  131  130    Hemoglobin A1C  5.60       Data reviewed : Radiologic studies 7/18/23 CT Abd            Assessment and Plan   Diagnoses and all orders for this  visit:    1. Acute cough (Primary)  -     POCT SARS-CoV-2 Antigen HOMERO + Flu    2. Bronchitis    3. Anxiety  Comments:  Med increased today  Assessment & Plan:  His current meds work and has mini panic attacks / he ran out if meds        4. Primary hypertension  Assessment & Plan:  Hypertension is improving with treatment.  Continue current treatment regimen.  Dietary sodium restriction.  Weight loss.  Regular aerobic exercise.  Blood pressure will be reassessed in 3 months.      5. Mixed hyperlipidemia  Assessment & Plan:  Lipid abnormalities are improving with treatment.  Nutritional counseling was provided. and Pharmacotherapy as ordered.  Lipids will be reassessed in 3 months.      6. Screening for prostate cancer           I spent 20 minutes caring for Alejo on this date of service. This time includes time spent by me in the following activities:reviewing tests, obtaining and/or reviewing a separately obtained history, performing a medically appropriate examination and/or evaluation , counseling and educating the patient/family/caregiver, ordering medications, tests, or procedures, and documenting information in the medical record       Follow Up   Return in about 2 months (around 10/18/2023) for Recheck, RTC FASTING LABS.  Patient was given instructions and counseling regarding his condition or for health maintenance advice. Please see specific information pulled into the AVS if appropriate.           This document has been electronically signed by Prince Hernández MD  August 18, 2023 11:57 EDT

## 2023-08-21 NOTE — PROGRESS NOTES
"OFFICE VISIT  NOTE  [facility] CARDIOLOGY      Name: Alejo Condon    Date: 2023  MRN:  [id number]  :  1960      REFERRING/PRIMARY PROVIDER:  Prince Hernández MD     Chief Complaint   Patient presents with    Congestive Heart Failure     HPI: Alejo Condon is a 63 y.o. male who presents today for follow-up for acute systolic CHF.  Associated history of hypertension, previous opioid dependence, alcohol abuse, tobacco abuse.  Was told by chiropractor 20 years ago he had an enlarged heart but never followed up with cardiology. Presented to ER 3/2020 for productive cough, treated for bronchitis, found to have ejection fraction 31% on echo 2020, diastolic dysfunction, biatrial enlargement.  Negative cath 2020.  Did not take Entresto due to cost, and this was replaced with Losartan over a year ago. Stopped drinking alcohol last year, however resumed it since then. Echo 2021 showed LVEF 36-40%. Repeat echo 2022 showed EF normalized to 51-55%, mild LVH. He is back to drinking alcohol, 6 pack of beers and \"a couple of shots.\" He is smoking 1/4 pack. He has a few chest pains, but these are random, nonexertional. For the past 1-2 weeks he has been dealing with a productive cough, saw his PCP and was prescribed steroids, antibiotics and Tessalon pearls. He is still coughing up yellow/green sputum. He denies any worsening exertional dyspnea or heart failure symptoms, specifically no orthopnea, PND or LE edema. He is active, mows yards.     ROS:Pertinent positives as listed in the HPI.  All other systems reviewed and negative.    Past Medical History:   Diagnosis Date    Acute systolic CHF (congestive heart failure) 2020    Echo 20 LVEF = 31.0%. Left ventricular diastolic dysfunction is noted (grade III w/high LAP) consistent with reversible restrictive pattern Left atrial volume is moderately increased.    Diverticulitis     Hyperlipidemia     Hypertension     " Opiate addiction     Resolved since on suboxone       Past Surgical History:   Procedure Laterality Date    ANAL FISTULA REPAIR N/A     CARDIAC CATHETERIZATION N/A 08/25/2020    Procedure: Left Heart Cath;  Surgeon: Garrett Trejo MD;  Location: [facility] CATH INVASIVE LOCATION;  Service: Cardiovascular;  Laterality: N/A;    FINGER SURGERY      REFRACTIVE SURGERY      RETINAL DETACHMENT REPAIR  07/2021       Social History     Socioeconomic History    Marital status:    Tobacco Use    Smoking status: Every Day     Packs/day: 0.25     Years: 20.00     Pack years: 5.00     Types: Cigarettes    Smokeless tobacco: Never    Tobacco comments:     on and off since he was 6 years old   Substance and Sexual Activity    Alcohol use: Not Currently     Comment: every other day    Drug use: Not Currently     Types: Marijuana     Comment: last smoked about 2/2023    Sexual activity: Defer       Family History   Problem Relation Age of Onset    Hypertension Mother     Hyperlipidemia Mother     Heart failure Mother     Heart attack Father 88    Cardiomyopathy Brother 36        Patient reports he had SCD that they attributed to agent orange        No Known Allergies    Current Outpatient Medications   Medication Instructions    albuterol sulfate  (90 Base) MCG/ACT inhaler 2 puffs, Inhalation, Every 4 Hours PRN    atorvastatin (LIPITOR) 20 mg, Oral, Nightly    benzonatate (TESSALON) 200 mg, Oral, 3 Times Daily PRN    Blood Pressure Monitoring (Blood Pressure Cuff) misc 1 Units, Does not apply, As Needed    buprenorphine-naloxone (SUBOXONE) 8-2 MG film film PLACE 2.75 FILM UNDER TONGUE ONCE A DAY    carvedilol (COREG) 6.25 mg, Oral, 2 Times Daily With Meals    doxycycline (VIBRAMYCIN) 100 mg, Oral, 2 Times Daily, Start tomorrow    furosemide (LASIX) 40 MG tablet TAKE 1 TABLET BY MOUTH TWICE DAILY FOR 1 WEEK. THEN DECREASE TO 1 TABLET BY MOUTH ONCE DAILY.    hydrOXYzine (ATARAX) 50 mg, Oral, Nightly    losartan (COZAAR)  "50 MG tablet TAKE 1 TABLET BY MOUTH EVERY DAY    omeprazole (PRILOSEC) 40 mg, Oral, Daily With Lunch    predniSONE (DELTASONE) 20 mg, Oral, Daily With Lunch, Start tomorrow    sertraline (ZOLOFT) 100 mg, Oral, 2 Times Daily    spironolactone (ALDACTONE) 25 MG tablet TAKE 1 TABLET BY MOUTH EVERY DAY    traZODone (DESYREL) 50 MG tablet TAKE 1 TABLET BY MOUTH EVERY DAY AT NIGHT       Vitals:    08/24/23 1049   BP: 142/80   BP Location: Right arm   Patient Position: Sitting   Pulse: 94   SpO2: 95%   Weight: 109 kg (240 lb)   Height: 172.7 cm (68\")     Body mass index is 36.49 kg/mý.    PHYSICAL EXAM:    General Appearance:   well developed  well nourished  Neck:  thyroid not enlarged  supple  Respiratory:  no respiratory distress  + expiratory wheezing  no rales  Cardiovascular:  no jugular venous distention  regular rhythm  apical impulse normal  S1 normal, S2 normal  no S3, no S4   no murmur  no rub, no thrill  lower extremity edema: none    Skin:   warm, dry    RESULTS:   Procedures    Results for orders placed during the hospital encounter of 11/30/22    Adult Transthoracic Echo Limited W/ Cont if Necessary Per Protocol    Interpretation Summary    Left ventricular systolic function is low normal. Left ventricular ejection fraction appears to be 51 - 55%.    Left ventricular wall thickness is consistent with mild concentric hypertrophy.        Labs:  Lab Results   Component Value Date    CHOL 228 (H) 07/18/2023    TRIG 191 (H) 07/18/2023    HDL 65 (H) 07/18/2023     (H) 07/18/2023    AST 17 07/18/2023    ALT 13 07/18/2023     Lab Results   Component Value Date    HGBA1C 5.60 04/18/2023     Creatinine   Date Value Ref Range Status   07/18/2023 1.02 0.76 - 1.27 mg/dL Final   04/18/2023 0.95 0.76 - 1.27 mg/dL Final   12/20/2022 0.87 0.76 - 1.27 mg/dL Final   08/25/2020 0.80 0.60 - 1.30 mg/dL Final     Comment:     Serial Number: [id number]:  [id number]     eGFR Non  Amer   Date Value Ref Range Status "   07/10/2021 81 >60 mL/min/1.73 Final   04/30/2021 78 >60 mL/min/1.73 Final   01/24/2021 79 >60 mL/min/1.73 Final         ASSESSMENT:  Problem List Items Addressed This Visit          Cardiac and Vasculature    Mixed hyperlipidemia    Acute systolic CHF (congestive heart failure) - Primary    Overview     8/2021 Limited Echo: LVEF 36-40%    Echo 5/6/20 LVEF = 31.0%, Left ventricular diastolic dysfunction is noted (grade III w/high LAP) consistent with reversible restrictive pattern, left atrial volume is moderately increased.    8/25/2020: Normal cardiac catheterization, normal coronaries, LVEDP 5 mmHg.         Relevant Medications    carvedilol (COREG) 6.25 MG tablet    Benign essential hypertension    Relevant Medications    losartan (COZAAR) 50 MG tablet    carvedilol (COREG) 6.25 MG tablet    furosemide (LASIX) 40 MG tablet    CHF (congestive heart failure)    Relevant Medications    losartan (COZAAR) 50 MG tablet    carvedilol (COREG) 6.25 MG tablet    furosemide (LASIX) 40 MG tablet       Mental Health    Alcohol abuse       Tobacco    Tobacco abuse     Other Visit Diagnoses       Essential hypertension        Relevant Medications    losartan (COZAAR) 50 MG tablet    carvedilol (COREG) 6.25 MG tablet    furosemide (LASIX) 40 MG tablet            PLAN:  1.  Acute on chronic systolic heart failure, improved on last echo  EF 36-40% 8/2021  Normal coronaries 8/25/2020, presumptive etiology will be alcohol and hypertension related  Repeat echo 12/2022 with LVEF 51-55%  Continue spironolactone 25 mg daily, carvedilol and Losartan   Could not afford Entresto   Continue Lasix 40mg daily   Currently appears compensated from CHF standpoint without LE edema, orthopnea or PND symptoms. He will continue to monitor and call us for any of the above     Low-sodium diet  Discussed importance of alcohol abstinence to prevent another heart failure episode     2.  Essential hypertension:  Slightly elevated today, however patient  did not take any of his medicines prior to his appointment   Continue Losartan, Carvedilol and Spironolactone   Discussed cutting back on alcohol with goal of cessation in order to improve BP control as well      3.  Alcohol and tobacco abuse  Discussed his cardiomyopathy was likely alcohol induced as his EF improved last year when he quit drinking  Unfortunately he has resumed alcohol and smoking 1/4 pack a day  Discussed cutting back with goal cessation. He was previously given Antabuse tablets. Recommended he follow up with PCP in this regard    4. Cough/ wheezing  Treated by PCP with steroids, antibiotics and Tessalon pearls  Recommended follow up with PCP next week if cough is not improved and he verbalized understanding             Advance Care Planning   ACP discussion was held with the patient during this visit. Patient does not have an advance directive, declines further assistance.          Follow-up   Return in about 6 months (around 2/24/2024) for with RDS.        Electronically signed by Annelise Summers PA-C, 08/24/23, 12:25 PM EDT.

## 2023-08-24 ENCOUNTER — OFFICE VISIT (OUTPATIENT)
Dept: CARDIOLOGY | Facility: CLINIC | Age: 63
End: 2023-08-24
Payer: MEDICARE

## 2023-08-24 VITALS
HEIGHT: 68 IN | WEIGHT: 240 LBS | SYSTOLIC BLOOD PRESSURE: 142 MMHG | OXYGEN SATURATION: 95 % | BODY MASS INDEX: 36.37 KG/M2 | DIASTOLIC BLOOD PRESSURE: 80 MMHG | HEART RATE: 94 BPM

## 2023-08-24 DIAGNOSIS — I50.22 CHRONIC SYSTOLIC CONGESTIVE HEART FAILURE: ICD-10-CM

## 2023-08-24 DIAGNOSIS — F10.10 ALCOHOL ABUSE: ICD-10-CM

## 2023-08-24 DIAGNOSIS — I50.21 ACUTE SYSTOLIC CHF (CONGESTIVE HEART FAILURE): Primary | ICD-10-CM

## 2023-08-24 DIAGNOSIS — E78.2 MIXED HYPERLIPIDEMIA: ICD-10-CM

## 2023-08-24 DIAGNOSIS — I10 ESSENTIAL HYPERTENSION: ICD-10-CM

## 2023-08-24 DIAGNOSIS — Z72.0 TOBACCO ABUSE: ICD-10-CM

## 2023-08-24 DIAGNOSIS — I10 BENIGN ESSENTIAL HYPERTENSION: ICD-10-CM

## 2023-08-24 RX ORDER — CARVEDILOL 6.25 MG/1
6.25 TABLET ORAL 2 TIMES DAILY WITH MEALS
Qty: 180 TABLET | Refills: 3 | Status: SHIPPED | OUTPATIENT
Start: 2023-08-24

## 2023-08-24 RX ORDER — ALBUTEROL SULFATE 2.5 MG/3ML
2.5 SOLUTION RESPIRATORY (INHALATION) EVERY 4 HOURS PRN
Qty: 3 ML | Refills: 2 | Status: SHIPPED | OUTPATIENT
Start: 2023-08-24

## 2023-08-24 RX ORDER — FUROSEMIDE 40 MG/1
40 TABLET ORAL DAILY
Qty: 90 TABLET | Refills: 3 | Status: SHIPPED | OUTPATIENT
Start: 2023-08-24

## 2023-08-24 RX ORDER — LOSARTAN POTASSIUM 50 MG/1
50 TABLET ORAL DAILY
Qty: 90 TABLET | Refills: 1 | Status: SHIPPED | OUTPATIENT
Start: 2023-08-24

## 2023-08-26 DIAGNOSIS — I50.22 CHRONIC SYSTOLIC CONGESTIVE HEART FAILURE: ICD-10-CM

## 2023-08-26 DIAGNOSIS — I10 ESSENTIAL HYPERTENSION: ICD-10-CM

## 2023-08-29 RX ORDER — FUROSEMIDE 40 MG/1
TABLET ORAL
Qty: 90 TABLET | Refills: 3 | OUTPATIENT
Start: 2023-08-29

## 2023-08-31 PROBLEM — R14.0 BLOATING: Status: ACTIVE | Noted: 2023-08-31

## 2023-08-31 PROBLEM — R10.13 EPIGASTRIC PAIN: Status: ACTIVE | Noted: 2023-08-31

## 2023-08-31 PROBLEM — Z12.11 ENCOUNTER FOR SCREENING FOR MALIGNANT NEOPLASM OF COLON: Status: ACTIVE | Noted: 2023-08-31

## 2023-09-11 ENCOUNTER — TELEPHONE (OUTPATIENT)
Dept: FAMILY MEDICINE CLINIC | Facility: CLINIC | Age: 63
End: 2023-09-11

## 2023-09-11 NOTE — TELEPHONE ENCOUNTER
Caller: Alejo Condon    Relationship: Self    Best call back number: [phone]     What was the call regarding: PATIENT IS UNCLEAR ABOUT WHEN AND HOW TO TAKE THE GALVALAX MEDICATION FOR UPPER GI AND BOTTOM SCOPE. HE THINKS HE IS TO TAKE STARTING AT NOON TODAY. PLEASE CALL TO ADVISE    Is it okay if the provider responds through MyChart: PLEASE CALL AS SOON AS POSSIBLE.

## 2023-09-12 ENCOUNTER — HOSPITAL ENCOUNTER (OUTPATIENT)
Facility: HOSPITAL | Age: 63
Setting detail: HOSPITAL OUTPATIENT SURGERY
Discharge: HOME | End: 2023-09-12
Attending: INTERNAL MEDICINE | Admitting: INTERNAL MEDICINE
Payer: MEDICARE

## 2023-09-12 ENCOUNTER — ANESTHESIA (OUTPATIENT)
Dept: PERIOP | Facility: HOSPITAL | Age: 63
End: 2023-09-12
Payer: MEDICARE

## 2023-09-12 ENCOUNTER — ANESTHESIA EVENT (OUTPATIENT)
Dept: PERIOP | Facility: HOSPITAL | Age: 63
End: 2023-09-12
Payer: MEDICARE

## 2023-09-12 VITALS
WEIGHT: 245 LBS | OXYGEN SATURATION: 97 % | SYSTOLIC BLOOD PRESSURE: 141 MMHG | DIASTOLIC BLOOD PRESSURE: 86 MMHG | HEART RATE: 56 BPM | RESPIRATION RATE: 20 BRPM | BODY MASS INDEX: 36.29 KG/M2 | HEIGHT: 69 IN | TEMPERATURE: 97.6 F

## 2023-09-12 DIAGNOSIS — Z12.11 ENCOUNTER FOR SCREENING FOR MALIGNANT NEOPLASM OF COLON: ICD-10-CM

## 2023-09-12 DIAGNOSIS — R10.13 EPIGASTRIC PAIN: ICD-10-CM

## 2023-09-12 DIAGNOSIS — R14.0 BLOATING: ICD-10-CM

## 2023-09-12 PROCEDURE — 88305 TISSUE EXAM BY PATHOLOGIST: CPT

## 2023-09-12 PROCEDURE — 25010000002 PROPOFOL 10 MG/ML EMULSION: Performed by: NURSE ANESTHETIST, CERTIFIED REGISTERED

## 2023-09-12 RX ORDER — ONDANSETRON 2 MG/ML
4 INJECTION INTRAMUSCULAR; INTRAVENOUS AS NEEDED
Status: DISCONTINUED | OUTPATIENT
Start: 2023-09-12 | End: 2023-09-12 | Stop reason: HOSPADM

## 2023-09-12 RX ORDER — SODIUM CHLORIDE 0.9 % (FLUSH) 0.9 %
10 SYRINGE (ML) INJECTION EVERY 12 HOURS SCHEDULED
Status: DISCONTINUED | OUTPATIENT
Start: 2023-09-12 | End: 2023-09-12 | Stop reason: HOSPADM

## 2023-09-12 RX ORDER — IPRATROPIUM BROMIDE AND ALBUTEROL SULFATE 2.5; .5 MG/3ML; MG/3ML
3 SOLUTION RESPIRATORY (INHALATION) ONCE AS NEEDED
Status: DISCONTINUED | OUTPATIENT
Start: 2023-09-12 | End: 2023-09-12 | Stop reason: HOSPADM

## 2023-09-12 RX ORDER — SODIUM CHLORIDE, SODIUM LACTATE, POTASSIUM CHLORIDE, CALCIUM CHLORIDE 600; 310; 30; 20 MG/100ML; MG/100ML; MG/100ML; MG/100ML
100 INJECTION, SOLUTION INTRAVENOUS ONCE AS NEEDED
Status: DISCONTINUED | OUTPATIENT
Start: 2023-09-12 | End: 2023-09-12 | Stop reason: HOSPADM

## 2023-09-12 RX ORDER — MEPERIDINE HYDROCHLORIDE 25 MG/ML
12.5 INJECTION INTRAMUSCULAR; INTRAVENOUS; SUBCUTANEOUS
Status: DISCONTINUED | OUTPATIENT
Start: 2023-09-12 | End: 2023-09-12 | Stop reason: HOSPADM

## 2023-09-12 RX ORDER — LIDOCAINE HYDROCHLORIDE 20 MG/ML
INJECTION, SOLUTION EPIDURAL; INFILTRATION; INTRACAUDAL; PERINEURAL AS NEEDED
Status: DISCONTINUED | OUTPATIENT
Start: 2023-09-12 | End: 2023-09-12 | Stop reason: SURG

## 2023-09-12 RX ORDER — MIDAZOLAM HYDROCHLORIDE 1 MG/ML
1 INJECTION INTRAMUSCULAR; INTRAVENOUS
Status: DISCONTINUED | OUTPATIENT
Start: 2023-09-12 | End: 2023-09-12 | Stop reason: HOSPADM

## 2023-09-12 RX ORDER — SODIUM CHLORIDE, SODIUM LACTATE, POTASSIUM CHLORIDE, CALCIUM CHLORIDE 600; 310; 30; 20 MG/100ML; MG/100ML; MG/100ML; MG/100ML
125 INJECTION, SOLUTION INTRAVENOUS ONCE
Status: COMPLETED | OUTPATIENT
Start: 2023-09-12 | End: 2023-09-12

## 2023-09-12 RX ORDER — FENTANYL CITRATE 50 UG/ML
50 INJECTION, SOLUTION INTRAMUSCULAR; INTRAVENOUS
Status: DISCONTINUED | OUTPATIENT
Start: 2023-09-12 | End: 2023-09-12 | Stop reason: HOSPADM

## 2023-09-12 RX ORDER — SODIUM CHLORIDE 0.9 % (FLUSH) 0.9 %
10 SYRINGE (ML) INJECTION AS NEEDED
Status: DISCONTINUED | OUTPATIENT
Start: 2023-09-12 | End: 2023-09-12 | Stop reason: HOSPADM

## 2023-09-12 RX ORDER — OXYCODONE HYDROCHLORIDE AND ACETAMINOPHEN 5; 325 MG/1; MG/1
1 TABLET ORAL ONCE AS NEEDED
Status: DISCONTINUED | OUTPATIENT
Start: 2023-09-12 | End: 2023-09-12 | Stop reason: HOSPADM

## 2023-09-12 RX ORDER — SODIUM CHLORIDE 9 MG/ML
40 INJECTION, SOLUTION INTRAVENOUS AS NEEDED
Status: DISCONTINUED | OUTPATIENT
Start: 2023-09-12 | End: 2023-09-12 | Stop reason: HOSPADM

## 2023-09-12 RX ORDER — BACLOFEN 10 MG/1
10 TABLET ORAL 2 TIMES DAILY
Qty: 60 TABLET | Refills: 5 | Status: SHIPPED | OUTPATIENT
Start: 2023-09-12

## 2023-09-12 RX ORDER — PANTOPRAZOLE SODIUM 40 MG/1
40 TABLET, DELAYED RELEASE ORAL DAILY
Qty: 90 TABLET | Refills: 3 | Status: SHIPPED | OUTPATIENT
Start: 2023-09-12

## 2023-09-12 RX ORDER — KETOROLAC TROMETHAMINE 30 MG/ML
30 INJECTION, SOLUTION INTRAMUSCULAR; INTRAVENOUS EVERY 6 HOURS PRN
Status: DISCONTINUED | OUTPATIENT
Start: 2023-09-12 | End: 2023-09-12 | Stop reason: HOSPADM

## 2023-09-12 RX ORDER — PROPOFOL 10 MG/ML
VIAL (ML) INTRAVENOUS CONTINUOUS PRN
Status: DISCONTINUED | OUTPATIENT
Start: 2023-09-12 | End: 2023-09-12 | Stop reason: SURG

## 2023-09-12 RX ADMIN — PROPOFOL 200 MCG/KG/MIN: 10 INJECTION, EMULSION INTRAVENOUS at 09:19

## 2023-09-12 RX ADMIN — SODIUM CHLORIDE, POTASSIUM CHLORIDE, SODIUM LACTATE AND CALCIUM CHLORIDE: 600; 310; 30; 20 INJECTION, SOLUTION INTRAVENOUS at 09:18

## 2023-09-12 RX ADMIN — LIDOCAINE HYDROCHLORIDE 60 MG: 20 INJECTION, SOLUTION EPIDURAL; INFILTRATION; INTRACAUDAL; PERINEURAL at 09:19

## 2023-09-12 NOTE — H&P
[facility]    Patient Identification:  Name:  Alejo Condon  Age:  63 y.o.  Sex:  male  :  1960  MRN:  [id number]   Visit Number:  [id number]  Primary Care Physician:  Prince Hernández MD     Chief complaint: Epigastric pain, Hiatal hernia, screening colonoscopy    History of presenting illness: Mr. Condon is a 63 y.o. male who presents today for EGD and screening colonoscopy. Mr. Condon reports he has been struggling with frequent epigastric pain which occurs particularly after eating and also when bending over. He reports being told he had a hiatal hernia several years ago. He reports occasional nausea without vomiting. He denies having burning in his chest or throat. He is taking omeprazole 40 mg PO daily. Denies dysphagia. Denies weight loss. He reports having prior colonoscopy ~10 years ago. He denies personal history of colon polyps. Denies family history of colon cancer. Denies melena or BRBPR. Denies any changes in bowel habits. He has no other complaints today.     Review of Systems   Constitutional: Negative.    HENT: Negative.     Eyes: Negative.    Respiratory: Negative.     Cardiovascular: Negative.    Gastrointestinal:  Positive for abdominal pain and nausea. Negative for abdominal distention, anal bleeding, blood in stool, constipation, diarrhea, rectal pain and vomiting.   Endocrine: Negative.    Genitourinary: Negative.    Musculoskeletal: Negative.    Allergic/Immunologic: Negative.    Neurological: Negative.    Hematological: Negative.    Psychiatric/Behavioral: Negative.        Past Medical History:   Diagnosis Date    Acute systolic CHF (congestive heart failure) 2020    Echo 20 LVEF = 31.0%. Left ventricular diastolic dysfunction is noted (grade III w/high LAP) consistent with reversible restrictive pattern Left atrial volume is moderately increased.    Diverticulitis     Elevated cholesterol     Hyperlipidemia      Hypertension     Opiate addiction     Resolved since on suboxone     Past Surgical History:   Procedure Laterality Date    ANAL FISTULA REPAIR N/A     CARDIAC CATHETERIZATION N/A 08/25/2020    Procedure: Left Heart Cath;  Surgeon: Garrett Trejo MD;  Location: [facility] CATH INVASIVE LOCATION;  Service: Cardiovascular;  Laterality: N/A;    COLONOSCOPY      ENDOSCOPY      FINGER SURGERY      REFRACTIVE SURGERY      RETINAL DETACHMENT REPAIR  07/2021     Family History   Problem Relation Age of Onset    Hypertension Mother     Hyperlipidemia Mother     Heart failure Mother     Heart attack Father 88    Cardiomyopathy Brother 36        Patient reports he had SCD that they attributed to agent orange     Social History     Socioeconomic History    Marital status:    Tobacco Use    Smoking status: Every Day     Packs/day: 0.25     Years: 20.00     Pack years: 5.00     Types: Cigarettes    Smokeless tobacco: Never    Tobacco comments:     on and off since he was 6 years old   Vaping Use    Vaping Use: Never used   Substance and Sexual Activity    Alcohol use: Yes     Comment: every other day    Drug use: Yes     Types: Marijuana     Comment: last smoked about 2/2023    Sexual activity: Defer       Allergies:  Patient has no known allergies.    Prior to Admission Medications       Prescriptions Last Dose Informant Patient Reported? Taking?    albuterol (PROVENTIL) (2.5 MG/3ML) 0.083% nebulizer solution Past Month  No Yes    Take 2.5 mg by nebulization Every 4 (Four) Hours As Needed for Wheezing.    albuterol sulfate  (90 Base) MCG/ACT inhaler Past Month  No Yes    Inhale 2 puffs Every 4 (Four) Hours As Needed for Wheezing or Shortness of Air.    atorvastatin (Lipitor) 20 MG tablet 9/11/2023  No Yes    Take 1 tablet by mouth Every Night for 90 days.    buprenorphine-naloxone (SUBOXONE) 8-2 MG film film 9/11/2023  Yes Yes    PLACE 2.75 FILM UNDER TONGUE ONCE A DAY    carvedilol (COREG) 6.25 MG tablet 9/11/2023   No Yes    Take 1 tablet by mouth 2 (Two) Times a Day With Meals.    furosemide (LASIX) 40 MG tablet 9/11/2023  No Yes    Take 1 tablet by mouth Daily.    hydrOXYzine (ATARAX) 50 MG tablet 9/11/2023  No Yes    Take 1 tablet by mouth Every Night for 180 days.    losartan (COZAAR) 50 MG tablet 9/11/2023  No Yes    Take 1 tablet by mouth Daily.    omeprazole (priLOSEC) 40 MG capsule 9/11/2023  No Yes    Take 1 capsule by mouth Daily With Lunch for 90 days.    sertraline (ZOLOFT) 100 MG tablet 9/11/2023  No Yes    Take 1 tablet by mouth 2 (Two) Times a Day for 90 days.    spironolactone (ALDACTONE) 25 MG tablet 9/11/2023  No Yes    TAKE 1 TABLET BY MOUTH EVERY DAY    traZODone (DESYREL) 50 MG tablet 9/11/2023  No Yes    TAKE 1 TABLET BY MOUTH EVERY DAY AT NIGHT    Blood Pressure Monitoring (Blood Pressure Cuff) misc Unknown  No No    1 Units As Needed (blood pressure checks).    cyanocobalamin injection 1,000 mcg   No No          Hospital Scheduled Meds:  lactated ringers, 125 mL/hr, Intravenous, Once  sodium chloride, 10 mL, Intravenous, Q12H      Vital Signs:  Temp:  [98.3 °F (36.8 °C)] 98.3 °F (36.8 °C)  Heart Rate:  [67] 67  Resp:  [20] 20  BP: (133)/(73) 133/73      09/12/23  0755   Weight: 111 kg (245 lb)     Body mass index is 36.18 kg/m².    Physical Exam:  Constitutional:  Alert and oriented. Well developed and well nourished, in no acute distress.  HENT:  Head: Normocephalic and atraumatic.  Mouth:  Moist mucous membranes.  OP clear, mmm  Eyes:  Conjunctivae and EOM are normal.  Pupils are equal, round, and reactive to light.  No scleral icterus.  Neck:  Neck supple.  No JVD present.    Cardiovascular:  RRR, no MRG.  Pulmonary/Chest:  CTAB, unlabored.   Abdominal:  Soft.  Bowel sounds are normal.  No distension and no tenderness.   Musculoskeletal:  No edema, no tenderness, and no deformity.   Neurological:  MS as above, grossly nonfocal exam         Assessment and Plan:   Proceed with EGD for evaluation of  epigastric pain/discomfort. Proceed with screening colonoscopy.     Yoli Jones, GAGAN  09/12/23  08:07 EDT

## 2023-09-12 NOTE — ANESTHESIA PREPROCEDURE EVALUATION
Anesthesia Evaluation     NPO Solid Status: > 8 hours  NPO Liquid Status: < 2 hours           Airway   Mallampati: II  TM distance: >3 FB  Neck ROM: full  No difficulty expected  Dental    (+) poor dentition    Pulmonary - normal exam   (+) a smoker Current,  Cardiovascular - normal exam    (+) hypertension, CHF , hyperlipidemia      Neuro/Psych  (+) psychiatric history  GI/Hepatic/Renal/Endo      Musculoskeletal     Abdominal   (-) obese   Substance History   (+) alcohol use, drug use     OB/GYN          Other   arthritis,     ROS/Med Hx Other: On Suboxone                  Anesthesia Plan    ASA 3     general     intravenous induction     Anesthetic plan, risks, benefits, and alternatives have been provided, discussed and informed consent has been obtained with: patient.    CODE STATUS:          No

## 2023-09-12 NOTE — ANESTHESIA POSTPROCEDURE EVALUATION
Patient: Alejo Condon    Procedure Summary       Date: 09/12/23 Room / Location: [facility] OR  /  [facility] OR    Anesthesia Start: 0918 Anesthesia Stop: 0949    Procedures:       ESOPHAGOGASTRODUODENOSCOPY WITH BIOPSY (Esophagus)      COLONOSCOPY FOR SCREENING Diagnosis:       Epigastric pain      Bloating      Encounter for screening for malignant neoplasm of colon      (Epigastric pain [R10.13])      (Bloating [R14.0])      (Encounter for screening for malignant neoplasm of colon [Z12.11])    Surgeons: Crystal Rodriguez MD Provider: Dudley Huntley MD    Anesthesia Type: general ASA Status: 3            Anesthesia Type: general    Vitals  No vitals data found for the desired time range.          Post Anesthesia Care and Evaluation    Patient location during evaluation: PHASE II  Patient participation: complete - patient participated  Level of consciousness: awake and alert  Pain score: 0  Pain management: adequate    Airway patency: patent  Anesthetic complications: No anesthetic complications    Cardiovascular status: acceptable  Respiratory status: acceptable  Hydration status: acceptable

## 2023-09-13 LAB — REF LAB TEST METHOD: NORMAL

## 2023-09-14 NOTE — PROGRESS NOTES
At the time of your recent upper endoscopy, biopsies were taken of the esophagus.  Biopsies revealed inflammation secondary to acid reflux.  Please continue Protonix 40 mg once daily.  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  There was no dysplasia.  You will need repeat upper endoscopy in 2 years.  Your colonoscopy did not reveal colon polyps.  You will need repeat colonoscopy in 10 years.

## 2023-09-23 DIAGNOSIS — I10 ESSENTIAL HYPERTENSION: ICD-10-CM

## 2023-09-23 DIAGNOSIS — I50.22 CHRONIC SYSTOLIC CONGESTIVE HEART FAILURE: ICD-10-CM

## 2023-09-23 DIAGNOSIS — F41.9 ANXIETY: ICD-10-CM

## 2023-09-23 RX ORDER — LOSARTAN POTASSIUM 50 MG/1
TABLET ORAL
Qty: 90 TABLET | Refills: 1 | OUTPATIENT
Start: 2023-09-23

## 2023-09-24 RX ORDER — LOSARTAN POTASSIUM 50 MG/1
TABLET ORAL
Qty: 90 TABLET | Refills: 1 | OUTPATIENT
Start: 2023-09-24

## 2023-09-25 RX ORDER — HYDROXYZINE 50 MG/1
TABLET, FILM COATED ORAL
Qty: 90 TABLET | Refills: 1 | Status: SHIPPED | OUTPATIENT
Start: 2023-09-25

## 2023-09-25 RX ORDER — TRAZODONE HYDROCHLORIDE 50 MG/1
TABLET ORAL
Qty: 90 TABLET | Refills: 0 | Status: SHIPPED | OUTPATIENT
Start: 2023-09-25 | End: 2023-09-28 | Stop reason: SDUPTHER

## 2023-09-25 RX ORDER — FUROSEMIDE 40 MG/1
TABLET ORAL
Qty: 90 TABLET | Refills: 3 | Status: SHIPPED | OUTPATIENT
Start: 2023-09-25

## 2023-09-26 ENCOUNTER — TELEPHONE (OUTPATIENT)
Dept: FAMILY MEDICINE CLINIC | Facility: CLINIC | Age: 63
End: 2023-09-26
Payer: MEDICARE

## 2023-09-26 NOTE — TELEPHONE ENCOUNTER
"Attempted to call pt regarding recent DEXA results. No answer. Unable to leave a voicemail at this time.     Relay      \" Dr. Hernández reviewed your DEXA results and you do have osteopenia of your left leg. This means that your bones started to thin in that area. She recommends you start supplementing with vitamin D 1000 international units and calcium 1200 mg a day. You can take Viactiv over the counter to supplement, as it has both the Vitamin D and Vitamin C in it. Please remind the patient that she did order his yearly labs and to please come in and get those done as well.  His next appointment with Dr. Hernández is on 10/18/2023, so those labs need to be done prior to appointment and patient needs to be fasting\"          "

## 2023-09-28 ENCOUNTER — OFFICE VISIT (OUTPATIENT)
Dept: FAMILY MEDICINE CLINIC | Facility: CLINIC | Age: 63
End: 2023-09-28
Payer: MEDICARE

## 2023-09-28 VITALS
BODY MASS INDEX: 35.84 KG/M2 | WEIGHT: 242 LBS | HEART RATE: 71 BPM | OXYGEN SATURATION: 93 % | HEIGHT: 69 IN | RESPIRATION RATE: 18 BRPM | DIASTOLIC BLOOD PRESSURE: 84 MMHG | SYSTOLIC BLOOD PRESSURE: 131 MMHG

## 2023-09-28 DIAGNOSIS — Z12.5 SCREENING FOR PROSTATE CANCER: ICD-10-CM

## 2023-09-28 DIAGNOSIS — R05.9 COUGH, UNSPECIFIED TYPE: Primary | ICD-10-CM

## 2023-09-28 DIAGNOSIS — F41.9 ANXIETY: ICD-10-CM

## 2023-09-28 DIAGNOSIS — I10 PRIMARY HYPERTENSION: ICD-10-CM

## 2023-09-28 DIAGNOSIS — R79.9 ABNORMAL FINDING OF BLOOD CHEMISTRY, UNSPECIFIED: ICD-10-CM

## 2023-09-28 DIAGNOSIS — I10 ESSENTIAL HYPERTENSION: ICD-10-CM

## 2023-09-28 DIAGNOSIS — E78.2 MIXED HYPERLIPIDEMIA: ICD-10-CM

## 2023-09-28 DIAGNOSIS — I50.22 CHRONIC SYSTOLIC CONGESTIVE HEART FAILURE: ICD-10-CM

## 2023-09-28 LAB
BILIRUB BLD-MCNC: NEGATIVE MG/DL
CLARITY, POC: CLEAR
COLOR UR: NORMAL
GLUCOSE UR STRIP-MCNC: NEGATIVE MG/DL
KETONES UR QL: NEGATIVE
LEUKOCYTE EST, POC: NEGATIVE
NITRITE UR-MCNC: NEGATIVE MG/ML
PH UR: 6 [PH] (ref 5–8)
PROT UR STRIP-MCNC: NEGATIVE MG/DL
RBC # UR STRIP: NEGATIVE /UL
SP GR UR: 1.01 (ref 1–1.03)
UROBILINOGEN UR QL: NORMAL

## 2023-09-28 PROCEDURE — 83036 HEMOGLOBIN GLYCOSYLATED A1C: CPT | Performed by: PSYCHOLOGIST

## 2023-09-28 PROCEDURE — 80061 LIPID PANEL: CPT | Performed by: PSYCHOLOGIST

## 2023-09-28 PROCEDURE — 85025 COMPLETE CBC W/AUTO DIFF WBC: CPT | Performed by: PSYCHOLOGIST

## 2023-09-28 PROCEDURE — G0103 PSA SCREENING: HCPCS | Performed by: PSYCHOLOGIST

## 2023-09-28 PROCEDURE — 80053 COMPREHEN METABOLIC PANEL: CPT | Performed by: PSYCHOLOGIST

## 2023-09-28 RX ORDER — ATORVASTATIN CALCIUM 20 MG/1
20 TABLET, FILM COATED ORAL NIGHTLY
Qty: 90 TABLET | Refills: 0 | Status: SHIPPED | OUTPATIENT
Start: 2023-09-28

## 2023-09-28 RX ORDER — OMEPRAZOLE 40 MG/1
40 CAPSULE, DELAYED RELEASE ORAL
Qty: 90 CAPSULE | Refills: 0 | Status: SHIPPED | OUTPATIENT
Start: 2023-09-28

## 2023-09-28 RX ORDER — MELOXICAM 7.5 MG/1
7.5 TABLET ORAL DAILY
Qty: 90 TABLET | Refills: 0 | Status: SHIPPED | OUTPATIENT
Start: 2023-09-28

## 2023-09-28 RX ORDER — SPIRONOLACTONE 25 MG/1
25 TABLET ORAL DAILY
Qty: 90 TABLET | Refills: 0 | Status: SHIPPED | OUTPATIENT
Start: 2023-09-28

## 2023-09-28 RX ORDER — CARVEDILOL 6.25 MG/1
6.25 TABLET ORAL 2 TIMES DAILY WITH MEALS
Qty: 180 TABLET | Refills: 0 | Status: SHIPPED | OUTPATIENT
Start: 2023-09-28

## 2023-09-28 RX ORDER — PREDNISONE 20 MG/1
20 TABLET ORAL
Qty: 10 TABLET | Refills: 0 | Status: SHIPPED | OUTPATIENT
Start: 2023-09-28 | End: 2023-10-08

## 2023-09-28 RX ORDER — ERGOCALCIFEROL 1.25 MG/1
50000 CAPSULE ORAL WEEKLY
Qty: 24 CAPSULE | Refills: 0 | Status: SHIPPED | OUTPATIENT
Start: 2023-09-28 | End: 2024-03-08

## 2023-09-28 RX ORDER — AZITHROMYCIN 250 MG/1
TABLET, FILM COATED ORAL
Qty: 6 TABLET | Refills: 1 | Status: SHIPPED | OUTPATIENT
Start: 2023-09-28

## 2023-09-28 RX ORDER — MELOXICAM 7.5 MG/1
7.5 TABLET ORAL DAILY
COMMUNITY
End: 2023-09-28 | Stop reason: SDUPTHER

## 2023-09-28 RX ORDER — TRAZODONE HYDROCHLORIDE 50 MG/1
50 TABLET ORAL
Qty: 90 TABLET | Refills: 0 | Status: SHIPPED | OUTPATIENT
Start: 2023-09-28

## 2023-09-28 RX ORDER — DEXAMETHASONE SODIUM PHOSPHATE 4 MG/ML
8 INJECTION, SOLUTION INTRA-ARTICULAR; INTRALESIONAL; INTRAMUSCULAR; INTRAVENOUS; SOFT TISSUE ONCE
Status: COMPLETED | OUTPATIENT
Start: 2023-09-28 | End: 2023-09-28

## 2023-09-28 RX ADMIN — CYANOCOBALAMIN 1000 MCG: 1000 INJECTION, SOLUTION INTRAMUSCULAR; SUBCUTANEOUS at 16:31

## 2023-09-28 RX ADMIN — DEXAMETHASONE SODIUM PHOSPHATE 8 MG: 4 INJECTION, SOLUTION INTRA-ARTICULAR; INTRALESIONAL; INTRAMUSCULAR; INTRAVENOUS; SOFT TISSUE at 16:31

## 2023-09-28 NOTE — PROGRESS NOTES
Chief Complaint  Med Refill and Edema (Bilateral foot swelling - worse on right side./)    Subjective        Alejo Condon presents to [facility]  C/o follow up chronic illness 2. Med refill 3. Fasting labs 4. Swelling of feet & wheezing/ cough   Hypertension  This is a chronic problem. The current episode started more than 1 year ago. The problem has been resolved since onset. The problem is controlled. Associated symptoms include anxiety. Pertinent negatives include no chest pain, headaches, orthopnea, palpitations or shortness of breath. Risk factors for coronary artery disease include post-menopausal state, obesity, male gender and dyslipidemia. Past treatments include lifestyle changes, diuretics and beta blockers. Current antihypertension treatment includes lifestyle changes, diuretics and beta blockers. The current treatment provides moderate improvement. There are no compliance problems.  There is no history of angina, kidney disease or CAD/MI. There is no history of chronic renal disease or a thyroid problem.   Anxiety  Presents for follow-up visit. Symptoms include panic. Patient reports no chest pain, decreased concentration, depressed mood, irritability, palpitations, shortness of breath or suicidal ideas. Symptoms occur occasionally. The severity of symptoms is moderate. The patient sleeps 5 hours per night. The quality of sleep is good. Nighttime awakenings: none.     Compliance with medications is %.   Hyperlipidemia  This is a chronic problem. The current episode started more than 1 year ago. The problem is uncontrolled. Recent lipid tests were reviewed and are high. Exacerbating diseases include obesity. He has no history of chronic renal disease. Factors aggravating his hyperlipidemia include smoking. Pertinent negatives include no chest pain or shortness of breath. Current antihyperlipidemic treatment includes statins. The current treatment provides mild  "improvement of lipids. There are no compliance problems.  Risk factors for coronary artery disease include hypertension, male sex and obesity.     Objective   Vital Signs:  /84   Pulse 71   Resp 18   Ht 175.3 cm (69\")   Wt 110 kg (242 lb)   SpO2 93%   BMI 35.74 kg/m²   Estimated body mass index is 35.74 kg/m² as calculated from the following:    Height as of this encounter: 175.3 cm (69\").    Weight as of this encounter: 110 kg (242 lb).            Physical Exam  Vitals and nursing note reviewed.   Constitutional:       Appearance: Normal appearance. He is obese.   HENT:      Head: Normocephalic.      Right Ear: Tympanic membrane normal.      Left Ear: Tympanic membrane normal.      Nose: Nose normal.      Mouth/Throat:      Mouth: Mucous membranes are moist.   Eyes:      Extraocular Movements: Extraocular movements intact.      Pupils: Pupils are equal, round, and reactive to light.   Cardiovascular:      Rate and Rhythm: Normal rate and regular rhythm.      Pulses: Normal pulses.      Heart sounds: Normal heart sounds.   Pulmonary:      Effort: Pulmonary effort is normal.      Breath sounds: Normal breath sounds.   Abdominal:      General: Abdomen is protuberant. Bowel sounds are normal.      Palpations: Abdomen is soft.   Musculoskeletal:         General: Normal range of motion.      Cervical back: Normal range of motion and neck supple.   Skin:     General: Skin is warm.      Capillary Refill: Capillary refill takes less than 2 seconds.   Neurological:      General: No focal deficit present.      Mental Status: He is alert and oriented to person, place, and time.   Psychiatric:         Mood and Affect: Mood normal.      Result Review :  The following data was reviewed by: Prince Hernández MD on 09/28/2023:  Common labs          4/18/2023    11:57 7/18/2023    11:35 9/28/2023    15:51   Common Labs   Glucose 106  102  97    BUN 15  20  19    Creatinine 0.95  1.02  0.92    Sodium 141  138  143  "   Potassium 4.7  4.3  3.9    Chloride 105  102  105    Calcium 9.8  9.0  9.6    Albumin 4.1  4.3  4.5    Total Bilirubin 0.4  0.3  0.5    Alkaline Phosphatase 58  65  68    AST (SGOT) 15  17  19    ALT (SGPT) 13  13  21    WBC 5.31   5.88    Hemoglobin 14.1   14.4    Hematocrit 43.3   42.5    Platelets 175   207    Total Cholesterol 223  228  172    Triglycerides 128  191  91    HDL Cholesterol 70  65  67    LDL Cholesterol  131  130  88    Hemoglobin A1C 5.60   6.20    PSA   2.530      Data reviewed : Radiologic studies 7/15/23 DEXA            Assessment and Plan   Diagnoses and all orders for this visit:    1. Cough, unspecified type (Primary)  -     dexAMETHasone (DECADRON) injection 8 mg    2. Essential hypertension  -     carvedilol (COREG) 6.25 MG tablet; Take 1 tablet by mouth 2 (Two) Times a Day With Meals.  Dispense: 180 tablet; Refill: 0  -     spironolactone (ALDACTONE) 25 MG tablet; Take 1 tablet by mouth Daily.  Dispense: 90 tablet; Refill: 0    3. Chronic systolic congestive heart failure  -     carvedilol (COREG) 6.25 MG tablet; Take 1 tablet by mouth 2 (Two) Times a Day With Meals.  Dispense: 180 tablet; Refill: 0  -     spironolactone (ALDACTONE) 25 MG tablet; Take 1 tablet by mouth Daily.  Dispense: 90 tablet; Refill: 0    4. Anxiety  Comments:  med increased today  Orders:  -     traZODone (DESYREL) 50 MG tablet; Take 1 tablet by mouth every night at bedtime.  Dispense: 90 tablet; Refill: 0    5. Primary hypertension  -     PSA Screen  -     Hemoglobin A1c  -     Lipid Panel  -     Comprehensive Metabolic Panel  -     CBC & Differential  -     POC Urinalysis Dipstick    6. Mixed hyperlipidemia  -     PSA Screen  -     Hemoglobin A1c  -     Lipid Panel  -     Comprehensive Metabolic Panel  -     CBC & Differential  -     POC Urinalysis Dipstick    7. Screening for prostate cancer  -     PSA Screen    8. Abnormal finding of blood chemistry, unspecified  -     Hemoglobin A1c    Other orders  -      atorvastatin (Lipitor) 20 MG tablet; Take 1 tablet by mouth Every Night.  Dispense: 90 tablet; Refill: 0  -     meloxicam (MOBIC) 7.5 MG tablet; Take 1 tablet by mouth Daily.  Dispense: 90 tablet; Refill: 0  -     omeprazole (priLOSEC) 40 MG capsule; Take 1 capsule by mouth Daily With Lunch.  Dispense: 90 capsule; Refill: 0  -     vitamin D (ERGOCALCIFEROL) 1.25 MG (59747 UT) capsule capsule; Take 1 capsule by mouth 1 (One) Time Per Week for 24 doses.  Dispense: 24 capsule; Refill: 0  -     azithromycin (Zithromax) 250 MG tablet; Take 2 tablets the first day, then 1 tablet daily for 4 days.  Dispense: 6 tablet; Refill: 1  -     predniSONE (DELTASONE) 20 MG tablet; Take 1 tablet by mouth Daily With Lunch for 10 days.  Dispense: 10 tablet; Refill: 0           I spent 20 minutes caring for Alejo on this date of service. This time includes time spent by me in the following activities:reviewing tests, obtaining and/or reviewing a separately obtained history, performing a medically appropriate examination and/or evaluation , counseling and educating the patient/family/caregiver, ordering medications, tests, or procedures, and documenting information in the medical record       Follow Up   Return in about 1 month (around 10/28/2023) for Recheck-- chronic illness -- anxiety med increased.  Patient was given instructions and counseling regarding his condition or for health maintenance advice. Please see specific information pulled into the AVS if appropriate.           This document has been electronically signed by Prince Hernández MD  September 29, 2023 08:46 EDT

## 2023-09-29 LAB
ALBUMIN SERPL-MCNC: 4.5 G/DL (ref 3.5–5.2)
ALBUMIN/GLOB SERPL: 1.7 G/DL
ALP SERPL-CCNC: 68 U/L (ref 39–117)
ALT SERPL W P-5'-P-CCNC: 21 U/L (ref 1–41)
ANION GAP SERPL CALCULATED.3IONS-SCNC: 11 MMOL/L (ref 5–15)
AST SERPL-CCNC: 19 U/L (ref 1–40)
BASOPHILS # BLD AUTO: 0.03 10*3/MM3 (ref 0–0.2)
BASOPHILS NFR BLD AUTO: 0.5 % (ref 0–1.5)
BILIRUB SERPL-MCNC: 0.5 MG/DL (ref 0–1.2)
BUN SERPL-MCNC: 19 MG/DL (ref 8–23)
BUN/CREAT SERPL: 20.7 (ref 7–25)
CALCIUM SPEC-SCNC: 9.6 MG/DL (ref 8.6–10.5)
CHLORIDE SERPL-SCNC: 105 MMOL/L (ref 98–107)
CHOLEST SERPL-MCNC: 172 MG/DL (ref 0–200)
CO2 SERPL-SCNC: 27 MMOL/L (ref 22–29)
CREAT SERPL-MCNC: 0.92 MG/DL (ref 0.76–1.27)
DEPRECATED RDW RBC AUTO: 42.4 FL (ref 37–54)
EGFRCR SERPLBLD CKD-EPI 2021: 92.9 ML/MIN/1.73
EOSINOPHIL # BLD AUTO: 0.11 10*3/MM3 (ref 0–0.4)
EOSINOPHIL NFR BLD AUTO: 1.9 % (ref 0.3–6.2)
ERYTHROCYTE [DISTWIDTH] IN BLOOD BY AUTOMATED COUNT: 13.3 % (ref 12.3–15.4)
GLOBULIN UR ELPH-MCNC: 2.6 GM/DL
GLUCOSE SERPL-MCNC: 97 MG/DL (ref 65–99)
HBA1C MFR BLD: 6.2 % (ref 4.8–5.6)
HCT VFR BLD AUTO: 42.5 % (ref 37.5–51)
HDLC SERPL-MCNC: 67 MG/DL (ref 40–60)
HGB BLD-MCNC: 14.4 G/DL (ref 13–17.7)
IMM GRANULOCYTES # BLD AUTO: 0.01 10*3/MM3 (ref 0–0.05)
IMM GRANULOCYTES NFR BLD AUTO: 0.2 % (ref 0–0.5)
LDLC SERPL CALC-MCNC: 88 MG/DL (ref 0–100)
LDLC/HDLC SERPL: 1.3 {RATIO}
LYMPHOCYTES # BLD AUTO: 1.8 10*3/MM3 (ref 0.7–3.1)
LYMPHOCYTES NFR BLD AUTO: 30.6 % (ref 19.6–45.3)
MCH RBC QN AUTO: 29.6 PG (ref 26.6–33)
MCHC RBC AUTO-ENTMCNC: 33.9 G/DL (ref 31.5–35.7)
MCV RBC AUTO: 87.4 FL (ref 79–97)
MONOCYTES # BLD AUTO: 0.41 10*3/MM3 (ref 0.1–0.9)
MONOCYTES NFR BLD AUTO: 7 % (ref 5–12)
NEUTROPHILS NFR BLD AUTO: 3.52 10*3/MM3 (ref 1.7–7)
NEUTROPHILS NFR BLD AUTO: 59.8 % (ref 42.7–76)
NRBC BLD AUTO-RTO: 0 /100 WBC (ref 0–0.2)
PLATELET # BLD AUTO: 207 10*3/MM3 (ref 140–450)
PMV BLD AUTO: 10.7 FL (ref 6–12)
POTASSIUM SERPL-SCNC: 3.9 MMOL/L (ref 3.5–5.2)
PROT SERPL-MCNC: 7.1 G/DL (ref 6–8.5)
PSA SERPL-MCNC: 2.53 NG/ML (ref 0–4)
RBC # BLD AUTO: 4.86 10*6/MM3 (ref 4.14–5.8)
SODIUM SERPL-SCNC: 143 MMOL/L (ref 136–145)
TRIGL SERPL-MCNC: 91 MG/DL (ref 0–150)
VLDLC SERPL-MCNC: 17 MG/DL (ref 5–40)
WBC NRBC COR # BLD: 5.88 10*3/MM3 (ref 3.4–10.8)

## 2023-10-02 ENCOUNTER — TELEPHONE (OUTPATIENT)
Dept: FAMILY MEDICINE CLINIC | Facility: CLINIC | Age: 63
End: 2023-10-02
Payer: MEDICARE

## 2023-10-02 NOTE — TELEPHONE ENCOUNTER
----- Message from Prince Hernández MD sent at 10/2/2023 10:08 AM EDT -----  PLEASE CALL PATIENT JERI GRANT SENT IN VIR D SUPPLEMENTS TO HIS PHARMACY TO START TAKING WEEKLY / TY  ----- Message -----  From: Leonor Sood MA  Sent: 9/26/2023  11:43 AM EDT  To: Prince Hernández MD    Patient was wondering if you could send supplements in so Medicare will pay for it.

## 2023-10-02 NOTE — TELEPHONE ENCOUNTER
"  Relay     \"Dr. Hernández has sent in a prescription for Vitamin D supplements to your pharmacy. You will take it once a week.\"          "

## 2023-10-05 DIAGNOSIS — I50.22 CHRONIC SYSTOLIC CONGESTIVE HEART FAILURE: ICD-10-CM

## 2023-10-05 DIAGNOSIS — I10 ESSENTIAL HYPERTENSION: ICD-10-CM

## 2023-10-05 RX ORDER — CARVEDILOL 6.25 MG/1
TABLET ORAL
Qty: 180 TABLET | Refills: 0 | OUTPATIENT
Start: 2023-10-05

## 2023-10-09 RX ORDER — OMEPRAZOLE 40 MG/1
CAPSULE, DELAYED RELEASE ORAL
Qty: 90 CAPSULE | Refills: 0 | OUTPATIENT
Start: 2023-10-09

## 2023-10-09 RX ORDER — ATORVASTATIN CALCIUM 20 MG/1
20 TABLET, FILM COATED ORAL NIGHTLY
Qty: 90 TABLET | Refills: 0 | OUTPATIENT
Start: 2023-10-09

## 2023-10-27 RX ORDER — ATORVASTATIN CALCIUM 20 MG/1
20 TABLET, FILM COATED ORAL NIGHTLY
Qty: 90 TABLET | Refills: 0 | Status: SHIPPED | OUTPATIENT
Start: 2023-10-27

## 2023-12-13 ENCOUNTER — OFFICE VISIT (OUTPATIENT)
Dept: GASTROENTEROLOGY | Facility: CLINIC | Age: 63
End: 2023-12-13
Payer: MEDICARE

## 2023-12-13 VITALS
SYSTOLIC BLOOD PRESSURE: 103 MMHG | HEIGHT: 69 IN | WEIGHT: 259 LBS | HEART RATE: 62 BPM | BODY MASS INDEX: 38.36 KG/M2 | DIASTOLIC BLOOD PRESSURE: 58 MMHG

## 2023-12-13 DIAGNOSIS — K21.9 GASTROESOPHAGEAL REFLUX DISEASE, UNSPECIFIED WHETHER ESOPHAGITIS PRESENT: ICD-10-CM

## 2023-12-13 DIAGNOSIS — R10.13 EPIGASTRIC PAIN: Primary | ICD-10-CM

## 2023-12-13 DIAGNOSIS — K22.70 BARRETT'S ESOPHAGUS WITHOUT DYSPLASIA: ICD-10-CM

## 2023-12-13 PROCEDURE — 1159F MED LIST DOCD IN RCRD: CPT | Performed by: NURSE PRACTITIONER

## 2023-12-13 PROCEDURE — 3074F SYST BP LT 130 MM HG: CPT | Performed by: NURSE PRACTITIONER

## 2023-12-13 PROCEDURE — 1160F RVW MEDS BY RX/DR IN RCRD: CPT | Performed by: NURSE PRACTITIONER

## 2023-12-13 PROCEDURE — 99214 OFFICE O/P EST MOD 30 MIN: CPT | Performed by: NURSE PRACTITIONER

## 2023-12-13 PROCEDURE — 3078F DIAST BP <80 MM HG: CPT | Performed by: NURSE PRACTITIONER

## 2023-12-13 RX ORDER — OMEPRAZOLE 40 MG/1
40 CAPSULE, DELAYED RELEASE ORAL 2 TIMES DAILY
Qty: 60 CAPSULE | Refills: 3 | Status: SHIPPED | OUTPATIENT
Start: 2023-12-13

## 2023-12-13 NOTE — PROGRESS NOTES
DATE:  12/13/2023    DIAGNOSIS: Enrique's esophagus, GERD    CHIEF COMPLAINT:  Follow up EGD/colonoscopy    Interval History:  Mr. Condon presents today for follow up. He was initially evaluated by Roz Ivey PA-C in August 2023. At that time, EGD was recommended for evaluation of epigastric pain, GERD and abdominal bloating. Since his last visit, he underwent EGD/colonoscopy with Dr. Robertson on 9/12/23. 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 Repeat EGD was recommended in 2 years for surveillance. Colonoscopy was normal and therefore repeat colonoscopy was recommended in 10 years. Following EGD, he has continued on omeprazole 40 mg PO daily. At present, he reports having ~2-3 flares with epigastric pain per week. He has history of alcohol abuse for several years as well as illicit drug use (methamphetamine, heroin and cocaine). He has been in rehab and denies drug use for the past ~2 years. He also decided to stop drinking and happily has abstained for the past ~3 weeks. He does admit to drinking 3 pots of coffee per day. He reports spicy and greasy foods make his symptoms worse. He retains his gallbladder. He has no other complaints today.      PAST MEDICAL HISTORY:  Past Medical History:   Diagnosis Date    Acute systolic CHF (congestive heart failure) 05/13/2020    Echo 5/6/20 LVEF = 31.0%. Left ventricular diastolic dysfunction is noted (grade III w/high LAP) consistent with reversible restrictive pattern Left atrial volume is moderately increased.    Diverticulitis     Elevated cholesterol     Hyperlipidemia     Hypertension     Opiate addiction     Resolved since on suboxone       PAST SURGICAL HISTORY:  Past Surgical History:   Procedure Laterality Date    ANAL FISTULA REPAIR N/A     CARDIAC CATHETERIZATION N/A 08/25/2020    Procedure: Left Heart Cath;  Surgeon: Garrett Trejo MD;  Location:   [facility];  Service: Cardiovascular;  Laterality: N/A;    COLONOSCOPY      COLONOSCOPY N/A 9/12/2023    Procedure: COLONOSCOPY FOR SCREENING;  Surgeon: Crystal Rodriguez MD;  Location: [facility] OR;  Service: Gastroenterology;  Laterality: N/A;    ENDOSCOPY      ENDOSCOPY N/A 9/12/2023    Procedure: ESOPHAGOGASTRODUODENOSCOPY WITH BIOPSY;  Surgeon: Crystal Rodriguez MD;  Location: [facility] OR;  Service: Gastroenterology;  Laterality: N/A;    FINGER SURGERY      REFRACTIVE SURGERY      RETINAL DETACHMENT REPAIR  07/2021       SOCIAL HISTORY:  Social History     Socioeconomic History    Marital status:    Tobacco Use    Smoking status: Every Day     Packs/day: 0.25     Years: 20.00     Additional pack years: 0.00     Total pack years: 5.00     Types: Cigarettes    Smokeless tobacco: Never    Tobacco comments:     on and off since he was 6 years old   Vaping Use    Vaping Use: Never used   Substance and Sexual Activity    Alcohol use: Yes     Comment: every other day    Drug use: Yes     Types: Marijuana     Comment: last smoked about 2/2023    Sexual activity: Defer       FAMILY HISTORY:  Family History   Problem Relation Age of Onset    Hypertension Mother     Hyperlipidemia Mother     Heart failure Mother     Heart attack Father 88    Cardiomyopathy Brother 36        Patient reports he had SCD that they attributed to agent orange         MEDICATIONS:  The current medication list was reviewed in the EMR    Current Outpatient Medications:     albuterol (PROVENTIL) (2.5 MG/3ML) 0.083% nebulizer solution, Take 2.5 mg by nebulization Every 4 (Four) Hours As Needed for Wheezing., Disp: 3 mL, Rfl: 2    albuterol sulfate  (90 Base) MCG/ACT inhaler, Inhale 2 puffs Every 4 (Four) Hours As Needed for Wheezing or Shortness of Air., Disp: 6.7 g, Rfl: 0    atorvastatin (LIPITOR) 20 MG tablet, TAKE 1 TABLET BY MOUTH EVERY NIGHT FOR 90 DAYS., Disp: 90 tablet, Rfl: 0    azithromycin  (Zithromax) 250 MG tablet, Take 2 tablets the first day, then 1 tablet daily for 4 days., Disp: 6 tablet, Rfl: 1    baclofen (LIORESAL) 10 MG tablet, Take 1 tablet by mouth 2 (Two) Times a Day., Disp: 60 tablet, Rfl: 5    Blood Pressure Monitoring (Blood Pressure Cuff) misc, 1 Units As Needed (blood pressure checks)., Disp: 1 each, Rfl: 0    buprenorphine-naloxone (SUBOXONE) 8-2 MG film film, PLACE 2.75 FILM UNDER TONGUE ONCE A DAY, Disp: , Rfl:     carvedilol (COREG) 6.25 MG tablet, Take 1 tablet by mouth 2 (Two) Times a Day With Meals., Disp: 180 tablet, Rfl: 0    furosemide (LASIX) 40 MG tablet, TAKE 1 TABLET BY MOUTH TWICE DAILY FOR 1 WEEK. THEN DECREASE TO 1 TABLET BY MOUTH ONCE DAILY., Disp: 90 tablet, Rfl: 3    hydrOXYzine (ATARAX) 50 MG tablet, TAKE 1 TABLET BY MOUTH EVERY NIGHT FOR 90 DAYS., Disp: 90 tablet, Rfl: 1    losartan (COZAAR) 50 MG tablet, Take 1 tablet by mouth Daily., Disp: 90 tablet, Rfl: 1    meloxicam (MOBIC) 7.5 MG tablet, Take 1 tablet by mouth Daily., Disp: 90 tablet, Rfl: 0    omeprazole (priLOSEC) 40 MG capsule, Take 1 capsule by mouth Daily With Lunch., Disp: 90 capsule, Rfl: 0    sertraline (ZOLOFT) 100 MG tablet, Take 1 tablet by mouth 2 (Two) Times a Day for 90 days., Disp: 180 tablet, Rfl: 0    spironolactone (ALDACTONE) 25 MG tablet, Take 1 tablet by mouth Daily., Disp: 90 tablet, Rfl: 0    traZODone (DESYREL) 50 MG tablet, Take 1 tablet by mouth every night at bedtime., Disp: 90 tablet, Rfl: 0    vitamin D (ERGOCALCIFEROL) 1.25 MG (13306 UT) capsule capsule, Take 1 capsule by mouth 1 (One) Time Per Week for 24 doses., Disp: 24 capsule, Rfl: 0    Current Facility-Administered Medications:     cyanocobalamin injection 1,000 mcg, 1,000 mcg, Intramuscular, Q28 Days, Prince Hernández MD, 1,000 mcg at 09/28/23 1631    ALLERGIES:  No Known Allergies      REVIEW OF SYSTEMS:    A comprehensive 14 point review of systems was performed.  Significant findings as mentioned above.  All  other systems reviewed and are negative.        Physical Exam   Vital Signs:   Vitals:    12/13/23 1500   BP: 103/58   Pulse: 62   General:Obese, alert and oriented x 3, in no acute distress.   Head: ATNC   Eyes: PERRL, No evidence of conjunctivitis.   Nose: No nasal discharge.   Mouth: Oral mucosal membranes moist. No oral ulceration or hemorrhages.   Neck: Neck supple. No thyromegaly. No JVD.   Lungs: Clear in all fields to A&P without rales, rhonchi or wheezing.   Heart: Regular rate and rhythm. No murmurs, rubs, or gallops.   Abdomen: Soft. Bowel sounds are normoactive. Nontender with palpation.   Extremities: No cyanosis or edema.   Neurologic: MS as above. Grossly non focal exam.      RECENT LABS:  Lab Results   Component Value Date    WBC 5.88 09/28/2023    HGB 14.4 09/28/2023    HCT 42.5 09/28/2023    MCV 87.4 09/28/2023    RDW 13.3 09/28/2023     09/28/2023    NEUTRORELPCT 59.8 09/28/2023    LYMPHORELPCT 30.6 09/28/2023    MONORELPCT 7.0 09/28/2023    EOSRELPCT 1.9 09/28/2023    BASORELPCT 0.5 09/28/2023    NEUTROABS 3.52 09/28/2023    LYMPHSABS 1.80 09/28/2023       Lab Results   Component Value Date     09/28/2023    K 3.9 09/28/2023    CO2 27.0 09/28/2023     09/28/2023    BUN 19 09/28/2023    CREATININE 0.92 09/28/2023    EGFRIFNONA 81 07/10/2021    GLUCOSE 97 09/28/2023    CALCIUM 9.6 09/28/2023    ALKPHOS 68 09/28/2023    AST 19 09/28/2023    ALT 21 09/28/2023    BILITOT 0.5 09/28/2023    ALBUMIN 4.5 09/28/2023    PROTEINTOT 7.1 09/28/2023       ASSESSMENT & PLAN:  Alejo Condon is a very pleasant 63 y.o. male with    1.  Enrique's Esophagus:  2. GERD/Epigastric pain:    -He underwent EGD/colonoscopy with Dr. Robertson on 9/12/23. 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 Repeat EGD was recommended in 2 years for surveillance. Colonoscopy was normal and therefore repeat colonoscopy  was recommended in 10 years. We discussed results/recommendations.   -At present, he reports having ~2-3 flares per week. Therefore, will increase omeprazole to 40 mg PO twice daily. Encouraged continued abstinence from illicit drug use and alcohol. We also discussed lifestyle changes including weight loss and important dietary modifications, limiting triggers such as caffeine, chocolate, spicy foods, acidic foods, food with high fat content and carbonated beverages.   -RTC in 3 months for symptom check.       Electronically Signed by: Yoli Jones, GAGAN ,  December 13, 2023 14:56 EST       CC:   No ref. provider found  Prince Hernández MD

## 2023-12-21 ENCOUNTER — OFFICE VISIT (OUTPATIENT)
Dept: FAMILY MEDICINE CLINIC | Facility: CLINIC | Age: 63
End: 2023-12-21
Payer: MEDICARE

## 2023-12-21 VITALS
RESPIRATION RATE: 22 BRPM | HEIGHT: 69 IN | BODY MASS INDEX: 37.98 KG/M2 | SYSTOLIC BLOOD PRESSURE: 134 MMHG | DIASTOLIC BLOOD PRESSURE: 78 MMHG | TEMPERATURE: 98.4 F | WEIGHT: 256.4 LBS | OXYGEN SATURATION: 96 % | HEART RATE: 76 BPM

## 2023-12-21 DIAGNOSIS — J06.9 UPPER RESPIRATORY TRACT INFECTION, UNSPECIFIED TYPE: ICD-10-CM

## 2023-12-21 DIAGNOSIS — R05.1 ACUTE COUGH: ICD-10-CM

## 2023-12-21 DIAGNOSIS — U07.1 COVID-19: Primary | ICD-10-CM

## 2023-12-21 DIAGNOSIS — J02.9 SORE THROAT: ICD-10-CM

## 2023-12-21 LAB
EXPIRATION DATE: ABNORMAL
EXPIRATION DATE: NORMAL
FLUAV AG UPPER RESP QL IA.RAPID: NOT DETECTED
FLUBV AG UPPER RESP QL IA.RAPID: NOT DETECTED
INTERNAL CONTROL: ABNORMAL
INTERNAL CONTROL: NORMAL
Lab: ABNORMAL
Lab: NORMAL
S PYO AG THROAT QL: NEGATIVE
SARS-COV-2 AG UPPER RESP QL IA.RAPID: DETECTED

## 2023-12-21 RX ORDER — METHYLPREDNISOLONE 4 MG/1
TABLET ORAL
Qty: 21 EACH | Refills: 0 | Status: SHIPPED | OUTPATIENT
Start: 2023-12-21

## 2023-12-21 RX ORDER — PANTOPRAZOLE SODIUM 40 MG/1
40 TABLET, DELAYED RELEASE ORAL DAILY
COMMUNITY

## 2023-12-21 RX ORDER — CEFTRIAXONE 500 MG/1
500 INJECTION, POWDER, FOR SOLUTION INTRAMUSCULAR; INTRAVENOUS ONCE
Status: COMPLETED | OUTPATIENT
Start: 2023-12-21 | End: 2023-12-21

## 2023-12-21 RX ORDER — AMOXICILLIN AND CLAVULANATE POTASSIUM 875; 125 MG/1; MG/1
1 TABLET, FILM COATED ORAL 2 TIMES DAILY
Qty: 20 TABLET | Refills: 0 | Status: SHIPPED | OUTPATIENT
Start: 2023-12-21 | End: 2023-12-31

## 2023-12-21 RX ORDER — GUAIFENESIN 600 MG/1
600 TABLET, EXTENDED RELEASE ORAL 2 TIMES DAILY
Qty: 20 TABLET | Refills: 0 | Status: SHIPPED | OUTPATIENT
Start: 2023-12-21

## 2023-12-21 RX ORDER — DEXAMETHASONE SODIUM PHOSPHATE 4 MG/ML
8 INJECTION, SOLUTION INTRA-ARTICULAR; INTRALESIONAL; INTRAMUSCULAR; INTRAVENOUS; SOFT TISSUE ONCE
Status: COMPLETED | OUTPATIENT
Start: 2023-12-21 | End: 2023-12-21

## 2023-12-21 RX ADMIN — DEXAMETHASONE SODIUM PHOSPHATE 8 MG: 4 INJECTION, SOLUTION INTRA-ARTICULAR; INTRALESIONAL; INTRAMUSCULAR; INTRAVENOUS; SOFT TISSUE at 14:19

## 2023-12-21 RX ADMIN — CEFTRIAXONE 500 MG: 500 INJECTION, POWDER, FOR SOLUTION INTRAMUSCULAR; INTRAVENOUS at 14:09

## 2023-12-21 NOTE — PATIENT INSTRUCTIONS
Upper Respiratory Infection, Adult  An upper respiratory infection (URI) affects the nose, throat, and upper airways that lead to the lungs. The most common type of URI is often called the common cold. URIs usually get better on their own, without medical treatment.  What are the causes?  A URI is caused by a germ (virus). You may catch these germs by:  Breathing in droplets from an infected person's cough or sneeze.  Touching something that has the germ on it (is contaminated) and then touching your mouth, nose, or eyes.  What increases the risk?  You are more likely to get a URI if:  You are very young or very old.  You have close contact with others, such as at work, school, or a health care facility.  You smoke.  You have long-term (chronic) heart or lung disease.  You have a weakened disease-fighting system (immune system).  You have nasal allergies or asthma.  You have a lot of stress.  You have poor nutrition.  What are the signs or symptoms?  Runny or stuffy (congested) nose.  Cough.  Sneezing.  Sore throat.  Headache.  Feeling tired (fatigue).  Fever.  Not wanting to eat as much as usual.  Pain in your forehead, behind your eyes, and over your cheekbones (sinus pain).  Muscle aches.  Redness or irritation of the eyes.  Pressure in the ears or face.  How is this treated?  URIs usually get better on their own within 7-10 days. Medicines cannot cure URIs, but your doctor may recommend certain medicines to help relieve symptoms, such as:  Over-the-counter cold medicines.  Medicines to reduce coughing (cough suppressants). Coughing is a type of defense against infection that helps to clear the nose, throat, windpipe, and lungs (respiratory system). Take these medicines only as told by your doctor.  Medicines to lower your fever.  Follow these instructions at home:  Activity  Rest as needed.  If you have a fever, stay home from work or school until your fever is gone, or until your doctor says you may return to  work or school.  You should stay home until you cannot spread the infection anymore (you are not contagious).  Your doctor may have you wear a face mask so you have less risk of spreading the infection.  Relieving symptoms  Rinse your mouth often with salt water. To make salt water, dissolve ½-1 tsp (3-6 g) of salt in 1 cup (237 mL) of warm water.  Use a cool-mist humidifier to add moisture to the air. This can help you breathe more easily.  Eating and drinking  Drink enough fluid to keep your pee (urine) pale yellow.  Eat soups and other clear broths.  General instructions  Take over-the-counter and prescription medicines only as told by your doctor.  Do not smoke or use any products that contain nicotine or tobacco. If you need help quitting, ask your doctor.  Avoid being where people are smoking (avoid secondhand smoke).  Stay up to date on all your shots (immunizations), and get the flu shot every year.  Keep all follow-up visits.  How to prevent the spread of infection to others  Wash your hands with soap and water for at least 20 seconds. If you cannot use soap and water, use hand .  Avoid touching your mouth, face, eyes, or nose.  Cough or sneeze into a tissue or your sleeve or elbow. Do not cough or sneeze into your hand or into the air.  Contact a doctor if:  You are getting worse, not better.  You have any of these:  A fever or chills.  Brown or red mucus in your nose.  Yellow or brown fluid (discharge)coming from your nose.  Pain in your face, especially when you bend forward.  Swollen neck glands.  Pain when you swallow.  White areas in the back of your throat.  Get help right away if:  You have shortness of breath that gets worse.  You have very bad or constant:  Headache.  Ear pain.  Pain in your forehead, behind your eyes, and over your cheekbones (sinus pain).  Chest pain.  You have long-lasting (chronic) lung disease along with any of these:  Making high-pitched whistling sounds when you  breathe, most often when you breathe out (wheezing).  Long-lasting cough (more than 14 days).  Coughing up blood.  A change in your usual mucus.  You have a stiff neck.  You have changes in your:  Vision.  Hearing.  Thinking.  Mood.  These symptoms may be an emergency. Get help right away. Call 911.  Do not wait to see if the symptoms will go away.  Do not drive yourself to the hospital.  Summary  An upper respiratory infection (URI) is caused by a germ (virus). The most common type of URI is often called the common cold.  URIs usually get better within 7-10 days.  Take over-the-counter and prescription medicines only as told by your doctor.  This information is not intended to replace advice given to you by your health care provider. Make sure you discuss any questions you have with your health care provider.  Document Revised: 07/20/2022 Document Reviewed: 07/20/2022  [employer] Education © 2022 [employer]

## 2023-12-21 NOTE — PROGRESS NOTES
"Chief Complaint  URI (3-4 days ago; cough; chest congestion; sore throat; fever)    Subjective        Alejo Condon presents to [facility] PRIMARY [facility]ARE as a primary care pt of Dr. Wagner for acute care (URI?).    URI   This is a new problem. The current episode started today. The problem has been gradually worsening. Maximum temperature: subjective fever per pt. Associated symptoms include congestion, coughing, headaches, rhinorrhea, sinus pain, sneezing and a sore throat. Pertinent negatives include no abdominal pain, chest pain, diarrhea, dysuria, ear pain, joint pain, joint swelling, nausea, neck pain, plugged ear sensation, rash, swollen glands, vomiting or wheezing. He has tried nothing for the symptoms.     Objective   Vital Signs:  /78 (BP Location: Right arm, Patient Position: Sitting, Cuff Size: Adult)   Pulse 76   Temp 98.4 °F (36.9 °C) (Temporal)   Resp 22   Ht 175.3 cm (69\")   Wt 116 kg (256 lb 6.4 oz)   SpO2 96%   BMI 37.86 kg/m²   Estimated body mass index is 37.86 kg/m² as calculated from the following:    Height as of this encounter: 175.3 cm (69\").    Weight as of this encounter: 116 kg (256 lb 6.4 oz).       Physical Exam  Vitals and nursing note reviewed.   Constitutional:       General: He is awake.      Appearance: Normal appearance. He is obese.   HENT:      Head: Normocephalic.      Right Ear: Hearing, tympanic membrane and external ear normal. Swelling and tenderness present.      Left Ear: Hearing, tympanic membrane and external ear normal. Swelling and tenderness present.      Nose: Nose normal. Nasal tenderness and congestion present.      Mouth/Throat:      Lips: Pink.      Mouth: Mucous membranes are moist.      Pharynx: Pharyngeal swelling and posterior oropharyngeal erythema present.   Eyes:      General: Lids are normal.      Conjunctiva/sclera: Conjunctivae normal.      Pupils: Pupils are equal, round, and reactive to light.   Cardiovascular:      " Rate and Rhythm: Normal rate and regular rhythm.      Heart sounds: Normal heart sounds.   Pulmonary:      Effort: Pulmonary effort is normal. No respiratory distress.      Breath sounds: Normal breath sounds. Decreased air movement present. Examination of the right-upper field reveals wheezing. Examination of the left-upper field reveals wheezing. Examination of the right-middle field reveals wheezing. Examination of the right-lower field reveals wheezing. Examination of the left-lower field reveals wheezing.   Abdominal:      General: Abdomen is protuberant. Bowel sounds are normal.      Palpations: Abdomen is soft.      Tenderness: There is no abdominal tenderness.   Musculoskeletal:         General: Normal range of motion.      Cervical back: Normal range of motion and neck supple.   Skin:     General: Skin is warm and dry.      Capillary Refill: Capillary refill takes less than 2 seconds.   Neurological:      Mental Status: He is alert and oriented to person, place, and time.      Sensory: Sensation is intact.      Motor: Motor function is intact.      Coordination: Coordination is intact.      Gait: Gait is intact.   Psychiatric:         Attention and Perception: Attention and perception normal.         Mood and Affect: Affect normal. Mood is anxious.         Speech: Speech is rapid and pressured.         Behavior: Behavior normal. Behavior is cooperative.         Thought Content: Thought content normal.         Cognition and Memory: Cognition normal.         Judgment: Judgment normal.          Result Review :  The following data was reviewed by: GAGAN Francisco on 12/21/2023:    SARS FLU A / B Point of Care test.    Influenza A - Negative (-)  Influenza B - Negative (-)  SARS COVID Ag - Positive (+)     Assessment and Plan   Diagnoses and all orders for this visit:    1. COVID-19 (Primary)  -     guaiFENesin (Mucinex) 600 MG 12 hr tablet; Take 1 tablet by mouth 2 (Two) Times a Day.  Dispense: 20 tablet;  Refill: 0  -     amoxicillin-clavulanate (AUGMENTIN) 875-125 MG per tablet; Take 1 tablet by mouth 2 (Two) Times a Day for 10 days.  Dispense: 20 tablet; Refill: 0  -     methylPREDNISolone (MEDROL) 4 MG dose pack; Take as directed on package instructions.  Dispense: 21 each; Refill: 0  -     dexAMETHasone (DECADRON) injection 8 mg  -     cefTRIAXone (ROCEPHIN) injection 500 mg    2. Upper respiratory tract infection, unspecified type  -     guaiFENesin (Mucinex) 600 MG 12 hr tablet; Take 1 tablet by mouth 2 (Two) Times a Day.  Dispense: 20 tablet; Refill: 0  -     amoxicillin-clavulanate (AUGMENTIN) 875-125 MG per tablet; Take 1 tablet by mouth 2 (Two) Times a Day for 10 days.  Dispense: 20 tablet; Refill: 0  -     methylPREDNISolone (MEDROL) 4 MG dose pack; Take as directed on package instructions.  Dispense: 21 each; Refill: 0  -     dexAMETHasone (DECADRON) injection 8 mg  -     cefTRIAXone (ROCEPHIN) injection 500 mg    3. Acute cough  -     POCT SARS-CoV-2 Antigen HOMERO + Flu  -     methylPREDNISolone (MEDROL) 4 MG dose pack; Take as directed on package instructions.  Dispense: 21 each; Refill: 0  -     dexAMETHasone (DECADRON) injection 8 mg    4. Sore throat  -     POCT rapid strep A         I spent 20 minutes caring for Alejo on this date of service. This time includes time spent by me in the following activities:preparing for the visit, reviewing tests, obtaining and/or reviewing a separately obtained history, performing a medically appropriate examination and/or evaluation , counseling and educating the patient/family/caregiver, ordering medications, tests, or procedures, documenting information in the medical record, and independently interpreting results and communicating that information with the patient/family/caregiver    Follow Up   Return if symptoms worsen or fail to improve with Dr. Hernández / ER with any worsening SOA or O2 <90%.  Patient was given instructions and counseling regarding his  condition or for health maintenance advice. Please see specific information pulled into the AVS if appropriate.         This document has been electronically signed by GAGAN Francisco  December 21, 2023 14:49 EST

## 2023-12-22 RX ORDER — MELOXICAM 7.5 MG/1
7.5 TABLET ORAL DAILY
Qty: 90 TABLET | Refills: 0 | Status: SHIPPED | OUTPATIENT
Start: 2023-12-22

## 2023-12-29 DIAGNOSIS — I50.22 CHRONIC SYSTOLIC CONGESTIVE HEART FAILURE: ICD-10-CM

## 2023-12-29 DIAGNOSIS — I10 ESSENTIAL HYPERTENSION: ICD-10-CM

## 2024-01-01 RX ORDER — LOSARTAN POTASSIUM 50 MG/1
50 TABLET ORAL DAILY
Qty: 90 TABLET | Refills: 1 | OUTPATIENT
Start: 2024-01-01

## 2024-01-05 DIAGNOSIS — I50.22 CHRONIC SYSTOLIC CONGESTIVE HEART FAILURE: ICD-10-CM

## 2024-01-05 DIAGNOSIS — I10 ESSENTIAL HYPERTENSION: ICD-10-CM

## 2024-01-05 RX ORDER — LOSARTAN POTASSIUM 50 MG/1
50 TABLET ORAL DAILY
Qty: 90 TABLET | Refills: 3 | Status: SHIPPED | OUTPATIENT
Start: 2024-01-05

## 2024-01-08 RX ORDER — SPIRONOLACTONE 25 MG/1
25 TABLET ORAL DAILY
Qty: 90 TABLET | Refills: 0 | Status: SHIPPED | OUTPATIENT
Start: 2024-01-08

## 2024-01-10 ENCOUNTER — TELEPHONE (OUTPATIENT)
Dept: CARDIOLOGY | Facility: CLINIC | Age: 64
End: 2024-01-10
Payer: MEDICARE

## 2024-01-10 DIAGNOSIS — U07.1 COVID-19: ICD-10-CM

## 2024-01-10 DIAGNOSIS — F41.9 ANXIETY: ICD-10-CM

## 2024-01-10 DIAGNOSIS — J06.9 UPPER RESPIRATORY TRACT INFECTION, UNSPECIFIED TYPE: ICD-10-CM

## 2024-01-10 DIAGNOSIS — R05.1 ACUTE COUGH: ICD-10-CM

## 2024-01-10 NOTE — TELEPHONE ENCOUNTER
Pt got transferred to scheduling and requested refills. Couldn't tell them which ones. Also asked for a sooner appt for pablito swelling.     Attempted to call pt back, no answer, LVM asking to confirm refills and see if he would be amendable to seeing H&V.

## 2024-01-10 NOTE — TELEPHONE ENCOUNTER
Caller: Alejo Condon    Relationship: Self    Best call back number: [phone]     Requested Prescriptions:   Requested Prescriptions     Pending Prescriptions Disp Refills    sertraline (ZOLOFT) 100 MG tablet [Pharmacy Med Name: SERTRALINE  MG TABLET] 166 tablet      Sig: TAKE 1 TABLET BY MOUTH TWICE A DAY    methylPREDNISolone (MEDROL) 4 MG dose pack 21 each 0     Sig: Take as directed on package instructions.        Pharmacy where request should be sent: [facility] [id number] [place] [address] - [phone] [facility] [phone]      Last office visit with prescribing clinician: 9/28/2023   Last telemedicine visit with prescribing clinician: Visit date not found   Next office visit with prescribing clinician: 1/11/2024     Additional details provided by patient: PATIENT IS COMPLETELY OUT OF THIS MEDICATION.    Does the patient have less than a 3 day supply:  [x] Yes  [] No    Would you like a call back once the refill request has been completed: [] Yes [x] No    If the office needs to give you a call back, can they leave a voicemail: [] Yes [x] No    Lillian Peña Rep   01/10/24 11:30 EST

## 2024-01-11 ENCOUNTER — OFFICE VISIT (OUTPATIENT)
Dept: FAMILY MEDICINE CLINIC | Facility: CLINIC | Age: 64
End: 2024-01-11
Payer: MEDICARE

## 2024-01-11 VITALS
BODY MASS INDEX: 37.44 KG/M2 | OXYGEN SATURATION: 95 % | DIASTOLIC BLOOD PRESSURE: 84 MMHG | SYSTOLIC BLOOD PRESSURE: 130 MMHG | HEART RATE: 91 BPM | WEIGHT: 252.8 LBS | RESPIRATION RATE: 18 BRPM | TEMPERATURE: 96.9 F | HEIGHT: 69 IN

## 2024-01-11 DIAGNOSIS — J06.9 UPPER RESPIRATORY TRACT INFECTION, UNSPECIFIED TYPE: ICD-10-CM

## 2024-01-11 DIAGNOSIS — R09.89 CHEST CONGESTION: Primary | ICD-10-CM

## 2024-01-11 DIAGNOSIS — M79.89 RIGHT LEG SWELLING: ICD-10-CM

## 2024-01-11 DIAGNOSIS — F41.9 ANXIETY: ICD-10-CM

## 2024-01-11 DIAGNOSIS — U07.1 COVID-19: ICD-10-CM

## 2024-01-11 DIAGNOSIS — M79.89 LEFT LEG SWELLING: ICD-10-CM

## 2024-01-11 DIAGNOSIS — J40 BRONCHITIS: ICD-10-CM

## 2024-01-11 DIAGNOSIS — J20.9 ACUTE BRONCHITIS, UNSPECIFIED ORGANISM: ICD-10-CM

## 2024-01-11 LAB
EXPIRATION DATE: ABNORMAL
FLUAV AG UPPER RESP QL IA.RAPID: NOT DETECTED
FLUBV AG UPPER RESP QL IA.RAPID: NOT DETECTED
INTERNAL CONTROL: ABNORMAL
Lab: ABNORMAL
SARS-COV-2 AG UPPER RESP QL IA.RAPID: NOT DETECTED

## 2024-01-11 PROCEDURE — 1160F RVW MEDS BY RX/DR IN RCRD: CPT | Performed by: PSYCHOLOGIST

## 2024-01-11 PROCEDURE — 1159F MED LIST DOCD IN RCRD: CPT | Performed by: PSYCHOLOGIST

## 2024-01-11 PROCEDURE — 3079F DIAST BP 80-89 MM HG: CPT | Performed by: PSYCHOLOGIST

## 2024-01-11 PROCEDURE — 3075F SYST BP GE 130 - 139MM HG: CPT | Performed by: PSYCHOLOGIST

## 2024-01-11 PROCEDURE — 99214 OFFICE O/P EST MOD 30 MIN: CPT | Performed by: PSYCHOLOGIST

## 2024-01-11 PROCEDURE — 87428 SARSCOV & INF VIR A&B AG IA: CPT | Performed by: PSYCHOLOGIST

## 2024-01-11 RX ORDER — SERTRALINE HYDROCHLORIDE 100 MG/1
100 TABLET, FILM COATED ORAL 2 TIMES DAILY
Qty: 60 TABLET | Refills: 2 | Status: SHIPPED | OUTPATIENT
Start: 2024-01-11 | End: 2024-04-10

## 2024-01-11 RX ORDER — SERTRALINE HYDROCHLORIDE 100 MG/1
100 TABLET, FILM COATED ORAL 2 TIMES DAILY
Qty: 166 TABLET | Refills: 0 | Status: SHIPPED | OUTPATIENT
Start: 2024-01-11 | End: 2024-01-11 | Stop reason: SDUPTHER

## 2024-01-11 RX ORDER — AZITHROMYCIN 250 MG/1
TABLET, FILM COATED ORAL
Qty: 11 TABLET | Refills: 0 | Status: SHIPPED | OUTPATIENT
Start: 2024-01-11

## 2024-01-11 RX ORDER — DEXAMETHASONE SODIUM PHOSPHATE 4 MG/ML
8 INJECTION, SOLUTION INTRA-ARTICULAR; INTRALESIONAL; INTRAMUSCULAR; INTRAVENOUS; SOFT TISSUE ONCE
Status: COMPLETED | OUTPATIENT
Start: 2024-01-11 | End: 2024-01-11

## 2024-01-11 RX ORDER — PREDNISONE 20 MG/1
20 TABLET ORAL
Qty: 10 TABLET | Refills: 0 | Status: SHIPPED | OUTPATIENT
Start: 2024-01-11 | End: 2024-01-21

## 2024-01-11 RX ORDER — ALBUTEROL SULFATE 90 UG/1
2 AEROSOL, METERED RESPIRATORY (INHALATION) EVERY 4 HOURS PRN
Qty: 6.7 G | Refills: 0 | Status: SHIPPED | OUTPATIENT
Start: 2024-01-11

## 2024-01-11 RX ORDER — GUAIFENESIN 600 MG/1
600 TABLET, EXTENDED RELEASE ORAL 2 TIMES DAILY
Qty: 20 TABLET | Refills: 0 | Status: SHIPPED | OUTPATIENT
Start: 2024-01-11

## 2024-01-11 RX ORDER — METHYLPREDNISOLONE 4 MG/1
TABLET ORAL
Qty: 21 EACH | Refills: 0 | OUTPATIENT
Start: 2024-01-11

## 2024-01-11 RX ADMIN — DEXAMETHASONE SODIUM PHOSPHATE 8 MG: 4 INJECTION, SOLUTION INTRA-ARTICULAR; INTRALESIONAL; INTRAMUSCULAR; INTRAVENOUS; SOFT TISSUE at 17:01

## 2024-01-11 RX ADMIN — CYANOCOBALAMIN 1000 MCG: 1000 INJECTION, SOLUTION INTRAMUSCULAR; SUBCUTANEOUS at 16:59

## 2024-01-11 NOTE — PROGRESS NOTES
"Chief Complaint  URI (Patient was dx with COVID on 12/21/2023. Didn't really get to feeling better. )    Subjective        Alejo Condon presents to [facility]IMARY [facility]  C/o an acute medical visit as his pcp 2. LEG SWELLING -- STOP SMOKE/DRINKING ALCOHOL  URI   This is a new problem. The current episode started 1 to 4 weeks ago. The problem has been gradually worsening. Associated symptoms include congestion, coughing, rhinorrhea, sinus pain and a sore throat. Pertinent negatives include no nausea or vomiting. The treatment provided mild relief.   Cough  This is a new problem. The current episode started 1 to 4 weeks ago. The problem has been worsening. The cough is Productive of yellow sputum. Associated symptoms include nasal congestion, rhinorrhea and a sore throat. Pertinent negatives include no fever. The treatment provided mild relief. There is no history of asthma or COPD.   Leg Swelling  This is a new problem. The current episode started more than 1 month ago. The problem occurs intermittently. The problem has been gradually worsening. Associated symptoms include congestion, coughing and a sore throat. Pertinent negatives include no fever, nausea or vomiting. He has tried nothing for the symptoms.       Objective   Vital Signs:  /84   Pulse 91   Temp 96.9 °F (36.1 °C) (Temporal)   Resp 18   Ht 175.3 cm (69\")   Wt 115 kg (252 lb 12.8 oz)   SpO2 95%   BMI 37.33 kg/m²   Estimated body mass index is 37.33 kg/m² as calculated from the following:    Height as of this encounter: 175.3 cm (69\").    Weight as of this encounter: 115 kg (252 lb 12.8 oz).        Physical Exam  Vitals and nursing note reviewed.   Constitutional:       Appearance: Normal appearance. He is obese.   HENT:      Head: Normocephalic.      Right Ear: Tympanic membrane normal.      Left Ear: Tympanic membrane normal.      Nose: Nose normal.      Mouth/Throat:      Mouth: Mucous membranes are moist.   Eyes: "      Extraocular Movements: Extraocular movements intact.      Pupils: Pupils are equal, round, and reactive to light.   Cardiovascular:      Rate and Rhythm: Normal rate and regular rhythm.      Pulses: Normal pulses.      Heart sounds: Normal heart sounds.   Pulmonary:      Effort: Pulmonary effort is normal.      Breath sounds: Examination of the right-lower field reveals decreased breath sounds. Examination of the left-lower field reveals decreased breath sounds. Decreased breath sounds present.   Abdominal:      General: Abdomen is protuberant. Bowel sounds are normal.      Palpations: Abdomen is soft.   Musculoskeletal:         General: Normal range of motion.      Cervical back: Normal range of motion and neck supple.      Right lower leg: Swelling present. Pitting      Left lower leg: Swelling present. Pitting   Skin:     General: Skin is warm.      Capillary Refill: Capillary refill takes less than 2 seconds.   Neurological:      General: No focal deficit present.      Mental Status: He is alert and oriented to person, place, and time.   Psychiatric:         Mood and Affect: Mood normal.        Result Review :  The following data was reviewed by: Prince Hernández MD on 01/11/2024:  Common labs          4/18/2023    11:57 7/18/2023    11:35 9/28/2023    15:51   Common Labs   Glucose 106  102  97    BUN 15  20  19    Creatinine 0.95  1.02  0.92    Sodium 141  138  143    Potassium 4.7  4.3  3.9    Chloride 105  102  105    Calcium 9.8  9.0  9.6    Albumin 4.1  4.3  4.5    Total Bilirubin 0.4  0.3  0.5    Alkaline Phosphatase 58  65  68    AST (SGOT) 15  17  19    ALT (SGPT) 13  13  21    WBC 5.31   5.88    Hemoglobin 14.1   14.4    Hematocrit 43.3   42.5    Platelets 175   207    Total Cholesterol 223  228  172    Triglycerides 128  191  91    HDL Cholesterol 70  65  67    LDL Cholesterol  131  130  88    Hemoglobin A1C 5.60   6.20    PSA   2.530      Data reviewed : Radiologic studies DEXA 7/18/23            Assessment and Plan   Diagnoses and all orders for this visit:    1. Chest congestion (Primary)  -     POCT SARS-CoV-2 Antigen HOMERO + Flu    2. Acute bronchitis, unspecified organism  -     dexAMETHasone (DECADRON) injection 8 mg    3. Left leg swelling    4. Right leg swelling  Comments:  INCREASE LASIX TO 40 MG BID X 7 DAYS    5. Bronchitis  -     albuterol sulfate  (90 Base) MCG/ACT inhaler; Inhale 2 puffs Every 4 (Four) Hours As Needed for Wheezing or Shortness of Air.  Dispense: 6.7 g; Refill: 0    6. Anxiety  Comments:  Med increased today  Orders:  -     sertraline (ZOLOFT) 100 MG tablet; Take 1 tablet by mouth 2 (Two) Times a Day for 90 days.  Dispense: 60 tablet; Refill: 2    7. COVID-19  -     guaiFENesin (Mucinex) 600 MG 12 hr tablet; Take 1 tablet by mouth 2 (Two) Times a Day.  Dispense: 20 tablet; Refill: 0    8. Upper respiratory tract infection, unspecified type  -     guaiFENesin (Mucinex) 600 MG 12 hr tablet; Take 1 tablet by mouth 2 (Two) Times a Day.  Dispense: 20 tablet; Refill: 0    Other orders  -     predniSONE (DELTASONE) 20 MG tablet; Take 1 tablet by mouth Daily With Lunch for 10 days. START TOMORROW  Dispense: 10 tablet; Refill: 0  -     azithromycin (Zithromax) 250 MG tablet; Take 2 tablets the first day, then 1 tablet daily for 9 days.  Dispense: 11 tablet; Refill: 0           I spent 30 minutes caring for Alejo on this date of service. This time includes time spent by me in the following activities:reviewing tests, obtaining and/or reviewing a separately obtained history, performing a medically appropriate examination and/or evaluation , counseling and educating the patient/family/caregiver, ordering medications, tests, or procedures, and documenting information in the medical record       Follow Up   Return in about 3 months (around 4/22/2024), or if symptoms worsen or fail to improve / RTC /ER, for Medicare Wellness, RTC FASTING LABS.  Patient was given instructions and  counseling regarding his condition or for health maintenance advice. Please see specific information pulled into the AVS if appropriate.           This document has been electronically signed by Prince Hernández MD  January 11, 2024 16:08 EST

## 2024-02-05 ENCOUNTER — TELEPHONE (OUTPATIENT)
Dept: FAMILY MEDICINE CLINIC | Facility: CLINIC | Age: 64
End: 2024-02-05
Payer: MEDICARE

## 2024-02-05 DIAGNOSIS — I50.22 CHRONIC SYSTOLIC CONGESTIVE HEART FAILURE: ICD-10-CM

## 2024-02-05 DIAGNOSIS — R93.89 ABNORMAL FINDING ON CT SCAN: ICD-10-CM

## 2024-02-05 DIAGNOSIS — K76.9 LESION OF LIVER: Primary | ICD-10-CM

## 2024-02-05 DIAGNOSIS — I10 ESSENTIAL HYPERTENSION: ICD-10-CM

## 2024-02-05 RX ORDER — CARVEDILOL 6.25 MG/1
6.25 TABLET ORAL 2 TIMES DAILY WITH MEALS
Qty: 180 TABLET | Refills: 0 | Status: SHIPPED | OUTPATIENT
Start: 2024-02-05

## 2024-02-05 NOTE — TELEPHONE ENCOUNTER
Caller: Alejo Condon WESLEY    Relationship: Self    Best call back number: [phone]     Requested Prescriptions:   Requested Prescriptions     Pending Prescriptions Disp Refills    carvedilol (COREG) 6.25 MG tablet 180 tablet 0     Sig: Take 1 tablet by mouth 2 (Two) Times a Day With Meals.      Pharmacy where request should be sent: [facility] [id number] [place] [id number] - [phone] [facility] [phone]      Last office visit with prescribing clinician: 1/11/2024   Last telemedicine visit with prescribing clinician: Visit date not found   Next office visit with prescribing clinician: 4/11/2024     Additional details provided by patient: THE PATIENT IS COMPLETELY OUT    Does the patient have less than a 3 day supply:  [x] Yes  [] No    Would you like a call back once the refill request has been completed: [] Yes [x] No    If the office needs to give you a call back, can they leave a voicemail: [] Yes [x] No    Lillian Reardon Rep   02/05/24 13:15 EST

## 2024-02-15 DIAGNOSIS — R06.02 SHORTNESS OF BREATH: Primary | ICD-10-CM

## 2024-02-22 DIAGNOSIS — F41.9 ANXIETY: ICD-10-CM

## 2024-02-22 DIAGNOSIS — I50.22 CHRONIC SYSTOLIC CONGESTIVE HEART FAILURE: ICD-10-CM

## 2024-02-22 DIAGNOSIS — R10.13 EPIGASTRIC PAIN: ICD-10-CM

## 2024-02-22 DIAGNOSIS — K22.70 BARRETT'S ESOPHAGUS WITHOUT DYSPLASIA: ICD-10-CM

## 2024-02-22 DIAGNOSIS — I10 ESSENTIAL HYPERTENSION: ICD-10-CM

## 2024-02-22 RX ORDER — LOSARTAN POTASSIUM 50 MG/1
50 TABLET ORAL DAILY
Qty: 90 TABLET | Refills: 3 | OUTPATIENT
Start: 2024-02-22

## 2024-02-22 RX ORDER — HYDROXYZINE 50 MG/1
50 TABLET, FILM COATED ORAL
Qty: 90 TABLET | Refills: 1 | Status: SHIPPED | OUTPATIENT
Start: 2024-02-22

## 2024-02-23 RX ORDER — TRAZODONE HYDROCHLORIDE 50 MG/1
50 TABLET ORAL
Qty: 90 TABLET | Refills: 0 | Status: SHIPPED | OUTPATIENT
Start: 2024-02-23

## 2024-02-23 RX ORDER — OMEPRAZOLE 40 MG/1
CAPSULE, DELAYED RELEASE ORAL
Qty: 90 CAPSULE | OUTPATIENT
Start: 2024-02-23

## 2024-03-12 ENCOUNTER — OFFICE VISIT (OUTPATIENT)
Dept: FAMILY MEDICINE CLINIC | Facility: CLINIC | Age: 64
End: 2024-03-12
Payer: MEDICARE

## 2024-03-12 VITALS
HEIGHT: 69 IN | OXYGEN SATURATION: 99 % | RESPIRATION RATE: 18 BRPM | SYSTOLIC BLOOD PRESSURE: 132 MMHG | HEART RATE: 78 BPM | BODY MASS INDEX: 39.1 KG/M2 | WEIGHT: 264 LBS | TEMPERATURE: 97.6 F | DIASTOLIC BLOOD PRESSURE: 78 MMHG

## 2024-03-12 DIAGNOSIS — L98.9 BENIGN SKIN LESION OF FOREARM: Primary | ICD-10-CM

## 2024-03-12 RX ORDER — FLUTICASONE FUROATE, UMECLIDINIUM BROMIDE AND VILANTEROL TRIFENATATE 200; 62.5; 25 UG/1; UG/1; UG/1
1 POWDER RESPIRATORY (INHALATION)
Qty: 14 EACH | Refills: 0 | COMMUNITY
Start: 2024-03-12 | End: 2024-03-26

## 2024-03-12 NOTE — PROGRESS NOTES
"Chief Complaint  Dermatology Referral    Subjective        Alejo Condon presents to [facility] PRIMARY CARE  C/O AN ACUTE MEDICAL ILLNESS AS HIS PCP:   History of Present Illness  Skin lesion :  ANTERIOR RIGHT FOREARM-- HAS GROWN IN SIZE AND CHANGE IN COLOR- LOOKS DARKER/ NO PAIN / NOTED X 2-3 YRS       Objective   Vital Signs:  /78   Pulse 78   Temp 97.6 °F (36.4 °C) (Temporal)   Resp 18   Ht 175.3 cm (69\")   Wt 120 kg (264 lb)   SpO2 99%   BMI 38.99 kg/m²   Estimated body mass index is 38.99 kg/m² as calculated from the following:    Height as of this encounter: 175.3 cm (69\").    Weight as of this encounter: 120 kg (264 lb).            Physical Exam  Vitals and nursing note reviewed.   Constitutional:       Appearance: Normal appearance. He is obese.   HENT:      Head: Normocephalic.      Right Ear: Tympanic membrane normal.      Left Ear: Tympanic membrane normal.      Nose: Nose normal.      Mouth/Throat:      Mouth: Mucous membranes are moist.   Eyes:      Extraocular Movements: Extraocular movements intact.      Pupils: Pupils are equal, round, and reactive to light.   Cardiovascular:      Rate and Rhythm: Normal rate and regular rhythm.      Pulses: Normal pulses.      Heart sounds: Normal heart sounds.   Pulmonary:      Effort: Pulmonary effort is normal.      Breath sounds: Normal breath sounds.   Abdominal:      General: Abdomen is protuberant. Bowel sounds are normal.      Palpations: Abdomen is soft.   Musculoskeletal:         General: Normal range of motion.        Arms:       Cervical back: Normal range of motion and neck supple.      Comments: AREA OF SKIN LESION TO DORSUM OF RIGHT ARM - DK BROWN IN COLOR/    Skin:     General: Skin is warm.      Capillary Refill: Capillary refill takes less than 2 seconds.   Neurological:      General: No focal deficit present.      Mental Status: He is alert and oriented to person, place, and time.   Psychiatric:         Mood and " Affect: Mood normal.        Result Review :  The following data was reviewed by: Prince Hernández MD on 03/12/2024:  Common labs          4/18/2023    11:57 7/18/2023    11:35 9/28/2023    15:51   Common Labs   Glucose 106  102  97    BUN 15  20  19    Creatinine 0.95  1.02  0.92    Sodium 141  138  143    Potassium 4.7  4.3  3.9    Chloride 105  102  105    Calcium 9.8  9.0  9.6    Albumin 4.1  4.3  4.5    Total Bilirubin 0.4  0.3  0.5    Alkaline Phosphatase 58  65  68    AST (SGOT) 15  17  19    ALT (SGPT) 13  13  21    WBC 5.31   5.88    Hemoglobin 14.1   14.4    Hematocrit 43.3   42.5    Platelets 175   207    Total Cholesterol 223  228  172    Triglycerides 128  191  91    HDL Cholesterol 70  65  67    LDL Cholesterol  131  130  88    Hemoglobin A1C 5.60   6.20    PSA   2.530      Data reviewed : Radiologic studies 3/7/24 CTA CHEST    Cryotherapy, Skin Lesion    Date/Time: 3/12/2024 4:42 PM    Performed by: Prince Hernández MD  Authorized by: Prince Hernández MD  Preparation: Patient was prepped and draped in the usual sterile fashion.  Local anesthesia used: no    Anesthesia:  Local anesthesia used: no    Sedation:  Patient sedated: no    Patient tolerance: patient tolerated the procedure well with no immediate complications  Comments: RIGHT FOREARM X 2 AREAS TREATED             Assessment and Plan   Diagnoses and all orders for this visit:    1. Benign skin lesion of forearm (Primary)  Comments:  AK VS SK    Other orders  -     Cryotherapy, Skin Lesion  -     Fluticasone-Umeclidin-Vilant (Trelegy Ellipta) 200-62.5-25 MCG/ACT inhaler; Inhale 1 puff Daily for 14 doses. NDC#[id number]  LOT# [id number] 8/25  # 1 GOOD FOR 14 DAYS  Dispense: 14 each; Refill: 0           I spent 20 minutes caring for Alejo on this date of service. This time includes time spent by me in the following activities:reviewing tests, obtaining and/or reviewing a separately obtained history, performing a medically  appropriate examination and/or evaluation , counseling and educating the patient/family/caregiver, ordering medications, tests, or procedures, and documenting information in the medical record       Follow Up   Return for already has an appt to change to 4/1924 for AWV /LABS.  Patient was given instructions and counseling regarding his condition or for health maintenance advice. Please see specific information pulled into the AVS if appropriate.           This document has been electronically signed by Prince Hernández MD  March 12, 2024 16:50 EDT

## 2024-03-26 RX ORDER — ATORVASTATIN CALCIUM 20 MG/1
20 TABLET, FILM COATED ORAL NIGHTLY
Qty: 90 TABLET | Refills: 0 | Status: SHIPPED | OUTPATIENT
Start: 2024-03-26

## 2024-03-26 NOTE — TELEPHONE ENCOUNTER
Incoming Refill Request      Medication requested (name and dose):     atorvastatin (LIPITOR) 20 MG tablet 20 mg, Oral, Nightly       Pharmacy where request should be sent: [facility]    Additional details provided by patient: 90DAY REFILL REQUEST    Best call back number: [phone]    Does the patient have less than a 3 day supply:  [] Yes  [] No    Lillian Hall Rep  03/26/24, 10:25 EDT

## 2024-04-11 ENCOUNTER — OFFICE VISIT (OUTPATIENT)
Dept: FAMILY MEDICINE CLINIC | Facility: CLINIC | Age: 64
End: 2024-04-11
Payer: MEDICARE

## 2024-04-11 VITALS
WEIGHT: 272 LBS | HEIGHT: 69 IN | SYSTOLIC BLOOD PRESSURE: 138 MMHG | BODY MASS INDEX: 40.29 KG/M2 | DIASTOLIC BLOOD PRESSURE: 84 MMHG | OXYGEN SATURATION: 95 % | TEMPERATURE: 97 F | HEART RATE: 67 BPM | RESPIRATION RATE: 18 BRPM

## 2024-04-11 DIAGNOSIS — M54.9 OTHER CHRONIC BACK PAIN: Primary | ICD-10-CM

## 2024-04-11 DIAGNOSIS — F41.9 ANXIETY: ICD-10-CM

## 2024-04-11 DIAGNOSIS — G89.29 OTHER CHRONIC BACK PAIN: Primary | ICD-10-CM

## 2024-04-11 RX ORDER — SERTRALINE HYDROCHLORIDE 100 MG/1
100 TABLET, FILM COATED ORAL
Qty: 90 TABLET | Refills: 0 | Status: SHIPPED | OUTPATIENT
Start: 2024-04-11 | End: 2024-07-10

## 2024-04-11 RX ORDER — PREDNISONE 20 MG/1
20 TABLET ORAL
Qty: 10 TABLET | Refills: 0 | Status: SHIPPED | OUTPATIENT
Start: 2024-04-11 | End: 2024-04-21

## 2024-04-11 RX ORDER — DEXAMETHASONE SODIUM PHOSPHATE 4 MG/ML
8 INJECTION, SOLUTION INTRA-ARTICULAR; INTRALESIONAL; INTRAMUSCULAR; INTRAVENOUS; SOFT TISSUE ONCE
Status: COMPLETED | OUTPATIENT
Start: 2024-04-11 | End: 2024-04-11

## 2024-04-11 RX ORDER — BACLOFEN 10 MG/1
10 TABLET ORAL 2 TIMES DAILY
Qty: 60 TABLET | Refills: 5 | Status: SHIPPED | OUTPATIENT
Start: 2024-04-11

## 2024-04-11 RX ORDER — KETOROLAC TROMETHAMINE 30 MG/ML
30 INJECTION, SOLUTION INTRAMUSCULAR; INTRAVENOUS ONCE
Status: COMPLETED | OUTPATIENT
Start: 2024-04-11 | End: 2024-04-11

## 2024-04-11 RX ADMIN — CYANOCOBALAMIN 1000 MCG: 1000 INJECTION, SOLUTION INTRAMUSCULAR; SUBCUTANEOUS at 16:06

## 2024-04-11 RX ADMIN — KETOROLAC TROMETHAMINE 30 MG: 30 INJECTION, SOLUTION INTRAMUSCULAR; INTRAVENOUS at 16:06

## 2024-04-11 RX ADMIN — DEXAMETHASONE SODIUM PHOSPHATE 8 MG: 4 INJECTION, SOLUTION INTRA-ARTICULAR; INTRALESIONAL; INTRAMUSCULAR; INTRAVENOUS; SOFT TISSUE at 16:04

## 2024-04-11 NOTE — PROGRESS NOTES
Injection  Injection performed in Left Ventrogluteal by Diane Fernández MA. Patient tolerated the procedure well without complications.  04/11/24   Diane Fernández MA       Injection  Injection performed in Left Deltiod by Diane Fernández MA. Patient tolerated the procedure well without complications.  04/11/24   Diane Fernández MA

## 2024-04-11 NOTE — PROGRESS NOTES
The ABCs of the Annual Wellness Visit  Subsequent Medicare Wellness Visit    Subjective      Alejo Condon is a 64 y.o. male who presents for a Subsequent Medicare Wellness Visit.    The following portions of the patient's history were reviewed and   updated as appropriate: allergies, current medications, past family history, past medical history, past social history, past surgical history, and problem list.    Compared to one year ago, the patient feels his physical   health is better.    Compared to one year ago, the patient feels his mental   health is better.    Recent Hospitalizations:  He was not admitted to the hospital during the last year.       Current Medical Providers:  Patient Care Team:  Prince Hernández MD as PCP - General (Urgent Care)  Garrett Trejo MD as Consulting Physician (Cardiology)  Annelise Summers PA-C as Physician Assistant (Cardiology)    Outpatient Medications Prior to Visit   Medication Sig Dispense Refill    albuterol (PROVENTIL) (2.5 MG/3ML) 0.083% nebulizer solution Take 2.5 mg by nebulization Every 4 (Four) Hours As Needed for Wheezing. 3 mL 2    albuterol sulfate  (90 Base) MCG/ACT inhaler Inhale 2 puffs Every 4 (Four) Hours As Needed for Wheezing or Shortness of Air. 6.7 g 0    atorvastatin (LIPITOR) 20 MG tablet Take 1 tablet by mouth Every Night. for 90 days 90 tablet 0    Blood Pressure Monitoring (Blood Pressure Cuff) misc 1 Units As Needed (blood pressure checks). 1 each 0    buprenorphine-naloxone (SUBOXONE) 8-2 MG film film PLACE 2.75 FILM UNDER TONGUE ONCE A DAY      carvedilol (COREG) 6.25 MG tablet Take 1 tablet by mouth 2 (Two) Times a Day With Meals. 180 tablet 0    furosemide (LASIX) 40 MG tablet TAKE 1 TABLET BY MOUTH TWICE DAILY FOR 1 WEEK. THEN DECREASE TO 1 TABLET BY MOUTH ONCE DAILY. 90 tablet 3    hydrOXYzine (ATARAX) 50 MG tablet TAKE ONE TABLET BY MOUTH EVERY NIGHT 90 tablet 1    losartan (COZAAR) 50 MG tablet Take 1 tablet by mouth  Daily. 90 tablet 3    meloxicam (MOBIC) 7.5 MG tablet TAKE 1 TABLET BY MOUTH DAILY 90 tablet 0    O2 (OXYGEN) Inhale 1 (One) Time.      omeprazole (priLOSEC) 40 MG capsule Take 1 capsule by mouth 2 (Two) Times a Day. 60 capsule 3    pantoprazole (PROTONIX) 40 MG EC tablet Take 1 tablet by mouth Daily.      spironolactone (ALDACTONE) 25 MG tablet TAKE 1 TABLET BY MOUTH DAILY 90 tablet 0    traZODone (DESYREL) 50 MG tablet TAKE 1 TABLET BY MOUTH EVERY DAY AT NIGHT 90 tablet 0    azithromycin (Zithromax) 250 MG tablet Take 2 tablets the first day, then 1 tablet daily for 9 days. (Patient not taking: Reported on 4/11/2024) 11 tablet 0    baclofen (LIORESAL) 10 MG tablet Take 1 tablet by mouth 2 (Two) Times a Day. (Patient not taking: Reported on 4/11/2024) 60 tablet 5    guaiFENesin (Mucinex) 600 MG 12 hr tablet Take 1 tablet by mouth 2 (Two) Times a Day. (Patient not taking: Reported on 4/11/2024) 20 tablet 0    sertraline (ZOLOFT) 100 MG tablet Take 1 tablet by mouth 2 (Two) Times a Day for 90 days. 60 tablet 2     Facility-Administered Medications Prior to Visit   Medication Dose Route Frequency Provider Last Rate Last Admin    cyanocobalamin injection 1,000 mcg  1,000 mcg Intramuscular Q28 Days Prince Hernández MD   1,000 mcg at 01/11/24 1659       Opioid medication/s are on active medication list.  and I have evaluated his active treatment plan and pain score trends (see table).  There were no vitals filed for this visit.  I have reviewed the chart for potential of high risk medication and harmful drug interactions in the elderly.          Aspirin is not on active medication list.  Aspirin use is not indicated based on review of current medical condition/s. Risk of harm outweighs potential benefits.  .    Patient Active Problem List   Diagnosis    Hypertension    Mixed hyperlipidemia    Acute systolic CHF (congestive heart failure)    Alcohol abuse    Tobacco abuse    Age-related nuclear cataract of right eye  "   Allergy    Benign essential hypertension    CHF (congestive heart failure)    Colon polyp    Diverticulitis    Elevated hemoglobin A1c    Erectile dysfunction    Intermittent exotropia, monocular    Irritable bowel syndrome (IBS)    Lumbar degenerative disc disease    Posterior tibial tendinitis of right lower extremity    Pseudophakia, right eye    Epiretinal membrane, right eye    Serous choroidal detachment of right eye    Tympanic membrane perforation    H/O vitrectomy    Other specified abdominal hernia without obstruction or gangrene    Anxiety    Epigastric pain    Bloating    Encounter for screening for malignant neoplasm of colon     Advance Care Planning   Advance Care Planning     Advance Directive he will get form and has assigned his daughter Madison Singleton     Objective    Vitals:    24 1456   BP: 138/84   BP Location: Right arm   Patient Position: Sitting   Cuff Size: Adult   Pulse: 67   Resp: 18   Temp: 97 °F (36.1 °C)   TempSrc: Temporal   SpO2: 95%   Weight: 123 kg (272 lb)   Height: 175.3 cm (69\")     Estimated body mass index is 40.17 kg/m² as calculated from the following:    Height as of this encounter: 175.3 cm (69\").    Weight as of this encounter: 123 kg (272 lb).           Does the patient have evidence of cognitive impairment?   MINI MENTAL STATUS EXAM ACE III SCORE: 29  No            HEALTH RISK ASSESSMENT    Smoking Status:  Social History     Tobacco Use   Smoking Status Every Day    Current packs/day: 0.25    Average packs/day: 0.3 packs/day for 20.0 years (5.0 ttl pk-yrs)    Types: Cigarettes   Smokeless Tobacco Never   Tobacco Comments    on and off since he was 6 years old     Alcohol Consumption:  Social History     Substance and Sexual Activity   Alcohol Use Yes    Comment: every other day     Fall Risk Screen:    STEADI Fall Risk Assessment was completed, and patient is at LOW risk for falls.Assessment completed on:3/12/2024    Depression Screenin/11/2024     " 2:54 PM   PHQ-2/PHQ-9 Depression Screening   Little Interest or Pleasure in Doing Things 1-->several days   Feeling Down, Depressed or Hopeless 1-->several days   Trouble Falling or Staying Asleep, or Sleeping Too Much 2-->more than half the days   Feeling Tired or Having Little Energy 3-->nearly every day   Poor Appetite or Overeating 1-->several days   Feeling Bad about Yourself - or that You are a Failure or Have Let Yourself or Your Family Down 1-->several days   Trouble Concentrating on Things, Such as Reading the Newspaper or Watching Television 0-->not at all   Moving or Speaking So Slowly that Other People Could Have Noticed? Or the Opposite - Being So Fidgety 0-->not at all   Thoughts that You Would be Better Off Dead or of Hurting Yourself in Some Way 0-->not at all   PHQ-9: Brief Depression Severity Measure Score 9   If You Checked Off Any Problems, How Difficult Have These Problems Made It For You to Do Your Work, Take Care of Things at Home, or Get Along with Other People? somewhat difficult       Health Habits and Functional and Cognitive Screenin/11/2024     2:53 PM   Functional & Cognitive Status   Do you have difficulty preparing food and eating? No   Do you have difficulty bathing yourself, getting dressed or grooming yourself? No   Do you have difficulty using the toilet? No   Do you have difficulty moving around from place to place? No   Do you have trouble with steps or getting out of a bed or a chair? No   Current Diet Diabetic Diet   Dental Exam Not up to date   Eye Exam Up to date   Exercise (times per week) 3 times per week   Current Exercises Include House Cleaning   Do you need help using the phone?  No   Are you deaf or do you have serious difficulty hearing?  No   Do you need help to go to places out of walking distance? No   Do you need help shopping? No   Do you need help preparing meals?  No   Do you need help with housework?  No   Do you need help with laundry? No   Do you  need help taking your medications? No   Do you need help managing money? No   Do you ever drive or ride in a car without wearing a seat belt? No   Have you felt unusual stress, anger or loneliness in the last month? Yes   Who do you live with? Other   If you need help, do you have trouble finding someone available to you? No   Have you been bothered in the last four weeks by sexual problems? No   Do you have difficulty concentrating, remembering or making decisions? Yes       Age-appropriate Screening Schedule:  Refer to the list below for future screening recommendations based on patient's age, sex and/or medical conditions. Orders for these recommended tests are listed in the plan section. The patient has been provided with a written plan.    Health Maintenance   Topic Date Due    HEPATITIS C SCREENING  04/22/2024 (Originally 4/27/2020)    Pneumococcal Vaccine 65+ (2 of 2 - PCV) 05/16/2024 (Originally 9/14/2019)    AAA SCREEN (ONE-TIME)  08/19/2024 (Originally 2/8/2024)    RSV Vaccine - Adults (1 - 1-dose 60+ series) 03/12/2025 (Originally 2/7/2019)    BMI FOLLOWUP  05/03/2024    INFLUENZA VACCINE  08/01/2024    LIPID PANEL  09/28/2024    ANNUAL WELLNESS VISIT  04/11/2025    DXA SCAN  09/19/2025    TDAP/TD VACCINES (3 - Td or Tdap) 06/20/2027    COLORECTAL CANCER SCREENING  09/12/2033    COVID-19 Vaccine  Discontinued    ZOSTER VACCINE  Discontinued                  CMS Preventative Services Quick Reference  Risk Factors Identified During Encounter:    Chronic Pain: Natural history and expected course discussed. Questions answered.  Depression/Dysphoria: Current medication is effective, no change recommended  Fall Risk-High or Moderate: Discussed Fall Prevention in the home  Glaucoma or Family History of Glaucoma:  Ophthalmology Appointment Recommended  Hearing Problem:  RECOMMENDED HEARRING SCREENING YEARLY  Immunizations Discussed/Encouraged: Tdap, Hepatitis A Vaccine/Series, Hepatitis B Vaccine/Series,  Influenza, Pneumococcal 23, Prevnar 20 (Pneumococcal 20-valent conjugate), Vaxneuvance (Pneumococcal 15-valent conjugate), Shingrix, COVID19, and RSV (Respiratory Syncytial Virus)  Polypharmacy: Medication List reviewed  Dental Screening Recommended  Vision Screening Recommended    The above risks/problems have been discussed with the patient.  Pertinent information has been shared with the patient in the After Visit Summary.    Diagnoses and all orders for this visit:    1. Other chronic back pain (Primary)  -     ketorolac (TORADOL) injection 30 mg  -     dexAMETHasone (DECADRON) injection 8 mg    2. Anxiety  Comments:  Med increased today  Orders:  -     sertraline (ZOLOFT) 100 MG tablet; Take 1 tablet by mouth Daily With Dinner for 90 days.  Dispense: 90 tablet; Refill: 0    Other orders  -     baclofen (LIORESAL) 10 MG tablet; Take 1 tablet by mouth 2 (Two) Times a Day.  Dispense: 60 tablet; Refill: 5  -     predniSONE (DELTASONE) 20 MG tablet; Take 1 tablet by mouth Daily With Lunch for 10 days.  Dispense: 10 tablet; Refill: 0        Follow Up:   Next Medicare Wellness visit to be scheduled in 1 year.      An After Visit Summary and PPPS were made available to the patient.            This document has been electronically signed by Prince Hernández MD  April 11, 2024 15:53 EDT

## 2024-04-18 ENCOUNTER — TELEPHONE (OUTPATIENT)
Dept: FAMILY MEDICINE CLINIC | Facility: CLINIC | Age: 64
End: 2024-04-18
Payer: MEDICARE

## 2024-04-18 NOTE — TELEPHONE ENCOUNTER
"  Caller: Alejo Condon    Relationship: Self    Best call back number:      What is the best time to reach you: ANYTIME    Who are you requesting to speak with (clinical staff, provider,  specific staff member): CLNICAL STAFF    Do you know the name of the person who called: ALEJO    What was the call regarding: PATIENT IS CALLING ABOUT HIS REFERRAL TO A \"BREATHING DOCTOR\", POSSIBLY DR STOKES?    Is it okay if the provider responds through MyChart: CALL BACK    "

## 2024-04-19 ENCOUNTER — TELEPHONE (OUTPATIENT)
Dept: FAMILY MEDICINE CLINIC | Facility: CLINIC | Age: 64
End: 2024-04-19
Payer: MEDICARE

## 2024-04-19 NOTE — TELEPHONE ENCOUNTER
Relay     Returned patients call regarding referral. I was not able to leave him a voicemail. He will need to schedule on appointment with Dr. Hrenández to get the referral process started.

## 2024-04-22 NOTE — TELEPHONE ENCOUNTER
Patient called in and spoke with Iggy Ring and requested referral be sent to [place] office. Updated referral accordingly.

## 2024-05-02 ENCOUNTER — OFFICE VISIT (OUTPATIENT)
Dept: CARDIOLOGY | Facility: CLINIC | Age: 64
End: 2024-05-02
Payer: MEDICARE

## 2024-05-02 VITALS
DIASTOLIC BLOOD PRESSURE: 54 MMHG | SYSTOLIC BLOOD PRESSURE: 94 MMHG | HEIGHT: 69 IN | OXYGEN SATURATION: 93 % | HEART RATE: 77 BPM | BODY MASS INDEX: 38.95 KG/M2 | WEIGHT: 263 LBS

## 2024-05-02 DIAGNOSIS — I10 ESSENTIAL HYPERTENSION: ICD-10-CM

## 2024-05-02 DIAGNOSIS — I50.22 CHRONIC SYSTOLIC CONGESTIVE HEART FAILURE: Primary | ICD-10-CM

## 2024-05-02 DIAGNOSIS — E78.2 MIXED HYPERLIPIDEMIA: ICD-10-CM

## 2024-05-02 DIAGNOSIS — I10 BENIGN ESSENTIAL HYPERTENSION: ICD-10-CM

## 2024-05-02 RX ORDER — FUROSEMIDE 40 MG/1
40 TABLET ORAL 2 TIMES DAILY
Qty: 180 TABLET | Refills: 3 | Status: SHIPPED | OUTPATIENT
Start: 2024-05-02

## 2024-05-02 RX ORDER — SPIRONOLACTONE 25 MG/1
25 TABLET ORAL DAILY
Qty: 90 TABLET | Refills: 3 | Status: SHIPPED | OUTPATIENT
Start: 2024-05-02

## 2024-05-02 RX ORDER — HYDROXYZINE 50 MG/1
50 TABLET, FILM COATED ORAL
Qty: 90 TABLET | Refills: 1 | Status: SHIPPED | OUTPATIENT
Start: 2024-05-02

## 2024-05-02 NOTE — TELEPHONE ENCOUNTER
Rx Refill Note  Requested Prescriptions     Pending Prescriptions Disp Refills    hydrOXYzine (ATARAX) 50 MG tablet 90 tablet 1     Sig: Take 1 tablet by mouth every night at bedtime.      Last office visit with prescribing clinician: 4/11/2024   Last telemedicine visit with prescribing clinician: Visit date not found   Next office visit with prescribing clinician: 6/11/2024                         Would you like a call back once the refill request has been completed: [] Yes [] No    If the office needs to give you a call back, can they leave a voicemail: [] Yes [] No    Brooklyn Tejada RN  05/02/24, 16:35 EDT

## 2024-05-02 NOTE — TELEPHONE ENCOUNTER
Caller: Taurus Alejo WESLEY    Relationship: Self    Best call back number: [phone]    Requested Prescriptions:   Requested Prescriptions     Pending Prescriptions Disp Refills    hydrOXYzine (ATARAX) 50 MG tablet 90 tablet 1     Sig: Take 1 tablet by mouth every night at bedtime.        Pharmacy where request should be sent: [facility] [id number] [place] [id number] - [phone] [facility] [phone]      Last office visit with prescribing clinician: 4/11/2024   Last telemedicine visit with prescribing clinician: Visit date not found   Next office visit with prescribing clinician: 6/11/2024       Does the patient have less than a 3 day supply:  [x] Yes  [] No    Would you like a call back once the refill request has been completed: [] Yes [x] No    If the office needs to give you a call back, can they leave a voicemail: [] Yes [x] No    Lillian Lou Rep   05/02/24 16:26 EDT

## 2024-05-02 NOTE — PROGRESS NOTES
OFFICE VISIT  NOTE  [facility] CARDIOLOGY      Name: Alejo Condon    Date: 2024  MRN:  [id number]  :  1960      REFERRING/PRIMARY PROVIDER:  Prince Hernández MD     Chief Complaint   Patient presents with    Acute systolic CHF (congestive heart failure)     HPI: Alejo Condon is a 64 y.o. male who presents for systolic CHF.  Associated history of hypertension, previous opioid dependence, alcohol abuse, tobacco abuse.  ER 3/2020 for productive cough, treated for bronchitis, found to have ejection fraction 31% on echo 2020, diastolic dysfunction, biatrial enlargement.  Negative cath 2020.  Did not take Entresto due to cost, replaced with Losartan over a year ago.  He stopped drinking and smoking again 10/2023.  Still having shortness of breath and some lower extreme edema worsening over the last 6 months      ROS:Pertinent positives as listed in the HPI.  All other systems reviewed and negative.    Past Medical History:   Diagnosis Date    Acute systolic CHF (congestive heart failure) 2020    Echo 20 LVEF = 31.0%. Left ventricular diastolic dysfunction is noted (grade III w/high LAP) consistent with reversible restrictive pattern Left atrial volume is moderately increased.    Diverticulitis     Elevated cholesterol     Hyperlipidemia     Hypertension     Opiate addiction     Resolved since on suboxone       Past Surgical History:   Procedure Laterality Date    ANAL FISTULA REPAIR N/A     CARDIAC CATHETERIZATION N/A 2020    Procedure: Left Heart Cath;  Surgeon: Garrett Trejo MD;  Location:  [facility];  Service: Cardiovascular;  Laterality: N/A;    COLONOSCOPY      COLONOSCOPY N/A 2023    Procedure: COLONOSCOPY FOR SCREENING;  Surgeon: Crystal Rodriguez MD;  Location:  [facility];  Service: Gastroenterology;  Laterality: N/A;    ENDOSCOPY      ENDOSCOPY N/A 2023    Procedure: ESOPHAGOGASTRODUODENOSCOPY WITH BIOPSY;   Surgeon: Crystal Rodriguez MD;  [facility];  Service: Gastroenterology;  Laterality: N/A;    FINGER SURGERY      REFRACTIVE SURGERY      RETINAL DETACHMENT REPAIR  07/2021       Social History     Socioeconomic History    Marital status:    Tobacco Use    Smoking status: Former     Current packs/day: 0.25     Average packs/day: 0.3 packs/day for 20.0 years (5.0 ttl pk-yrs)     Types: Cigarettes    Smokeless tobacco: Never    Tobacco comments:     on and off since he was 6 years old   Vaping Use    Vaping status: Never Used   Substance and Sexual Activity    Alcohol use: Not Currently     Comment: every other day    Drug use: Not Currently     Types: Marijuana     Comment: last smoked about 2/2023    Sexual activity: Defer       Family History   Problem Relation Age of Onset    Hypertension Mother     Hyperlipidemia Mother     Heart failure Mother     Heart attack Father 88    Cardiomyopathy Brother 36        Patient reports he had SCD that they attributed to agent orange        No Known Allergies    Current Outpatient Medications   Medication Instructions    albuterol (PROVENTIL) 2.5 mg, Nebulization, Every 4 Hours PRN    albuterol sulfate  (90 Base) MCG/ACT inhaler 2 puffs, Inhalation, Every 4 Hours PRN    atorvastatin (LIPITOR) 20 mg, Oral, Nightly, for 90 days    baclofen (LIORESAL) 10 mg, Oral, 2 Times Daily    Blood Pressure Monitoring (Blood Pressure Cuff) misc 1 Units, Does not apply, As Needed    buprenorphine-naloxone (SUBOXONE) 8-2 MG film film PLACE 2.75 FILM UNDER TONGUE ONCE A DAY    carvedilol (COREG) 6.25 mg, Oral, 2 Times Daily With Meals    empagliflozin (JARDIANCE) 10 mg, Oral, Every Morning    furosemide (LASIX) 40 mg, Oral, 2 Times Daily    hydrOXYzine (ATARAX) 50 mg, Oral, Every Night at Bedtime    losartan (COZAAR) 50 mg, Oral, Daily    meloxicam (MOBIC) 7.5 mg, Oral, Daily    O2 (OXYGEN) Inhalation, Once    pantoprazole (PROTONIX) 40 mg, Oral, Daily     "sertraline (ZOLOFT) 100 mg, Oral, Daily With Dinner    spironolactone (ALDACTONE) 25 mg, Oral, Daily    traZODone (DESYREL) 50 mg, Oral, Every Night at Bedtime       Vitals:    05/02/24 1537   BP: 94/54   BP Location: Right arm   Patient Position: Sitting   Pulse: 77   SpO2: 93%   Weight: 119 kg (263 lb)   Height: 175.3 cm (69\")       Body mass index is 38.84 kg/m².    PHYSICAL EXAM:    General Appearance:   well developed  well nourished  Neck:  thyroid not enlarged  supple  Respiratory:  no respiratory distress  + expiratory wheezing  no rales  Cardiovascular:  no jugular venous distention  regular rhythm  apical impulse normal  S1 normal, S2 normal  no S3, no S4   no murmur  no rub, no thrill  lower extremity edema: none    Skin:   warm, dry    RESULTS:   Procedures    Results for orders placed during the hospital encounter of 11/30/22    Adult Transthoracic Echo Limited W/ Cont if Necessary Per Protocol    Interpretation Summary    Left ventricular systolic function is low normal. Left ventricular ejection fraction appears to be 51 - 55%.    Left ventricular wall thickness is consistent with mild concentric hypertrophy.        Labs:  Lab Results   Component Value Date    CHOL 172 09/28/2023    TRIG 91 09/28/2023    HDL 67 (H) 09/28/2023    LDL 88 09/28/2023    AST 19 09/28/2023    ALT 21 09/28/2023     Lab Results   Component Value Date    HGBA1C 6.20 (H) 09/28/2023     Creatinine   Date Value Ref Range Status   09/28/2023 0.92 0.76 - 1.27 mg/dL Final   07/18/2023 1.02 0.76 - 1.27 mg/dL Final   04/18/2023 0.95 0.76 - 1.27 mg/dL Final   08/25/2020 0.80 0.60 - 1.30 mg/dL Final     Comment:     Serial Number: [id number]:  [id number]     eGFR Non  Amer   Date Value Ref Range Status   07/10/2021 81 >60 mL/min/1.73 Final   04/30/2021 78 >60 mL/min/1.73 Final   01/24/2021 79 >60 mL/min/1.73 Final         ASSESSMENT:  Problem List Items Addressed This Visit       Mixed hyperlipidemia    Benign essential " hypertension    Relevant Medications    furosemide (LASIX) 40 MG tablet    CHF (congestive heart failure) - Primary    Relevant Medications    furosemide (LASIX) 40 MG tablet    Other Relevant Orders    Adult Transthoracic Echo Complete w/ Color, Spectral and Contrast if necessary per protocol     Other Visit Diagnoses       Essential hypertension        Relevant Medications    furosemide (LASIX) 40 MG tablet              PLAN:  1.  Acute on chronic systolic heart failure, improved on last echo  EF 36-40% 8/2021  Normal coronaries 8/25/2020, presumptive etiology will be alcohol and hypertension related  Repeat echo 12/2022 with LVEF 51-55%  Continue spironolactone 25 mg daily, carvedilol and Losartan   Could not afford Entresto   Continue Lasix 40mg but increase to twice daily dosing    Refill spironolactone    Start Jardiance 10 mg daily    Repeat echo due to worsening shortness of breath and edema    Low-sodium diet  Discussed importance of alcohol abstinence to prevent another heart failure episode     2.  Essential hypertension:  Well-controlled  Continue Losartan, Carvedilol and Spironolactone        3.  Alcohol and tobacco abuse  Complete cessation 10/2023 congratulated him on quitting and advised complete abstinence           Advance Care Planning   ACP discussion was held with the patient during this visit. Patient does not have an advance directive, declines further assistance.          Follow-up   Return in about 9 months (around 2/2/2025).      Garrett Trejo MD, FACC, [facility]  Interventional Cardiology

## 2024-05-10 ENCOUNTER — HOSPITAL ENCOUNTER (OUTPATIENT)
Dept: CARDIOLOGY | Facility: HOSPITAL | Age: 64
Discharge: HOME | End: 2024-05-10
Payer: MEDICARE

## 2024-05-10 VITALS — WEIGHT: 262.35 LBS | HEIGHT: 69 IN | BODY MASS INDEX: 38.86 KG/M2

## 2024-05-10 DIAGNOSIS — I50.22 CHRONIC SYSTOLIC CONGESTIVE HEART FAILURE: ICD-10-CM

## 2024-05-10 LAB
ASCENDING AORTA: 3.5 CM
BH CV ECHO MEAS - AO MAX PG: 7.8 MMHG
BH CV ECHO MEAS - AO MEAN PG: 4.6 MMHG
BH CV ECHO MEAS - AO ROOT DIAM: 3.7 CM
BH CV ECHO MEAS - AO V2 MAX: 139.5 CM/SEC
BH CV ECHO MEAS - AO V2 VTI: 32.6 CM
BH CV ECHO MEAS - EF(MOD-BP): 54.5 %
BH CV ECHO MEAS - EF(MOD-SP2): 53.7 %
BH CV ECHO MEAS - EF(MOD-SP4): 56.2 %
BH CV ECHO MEAS - ESV(CUBED): 61.6 ML
BH CV ECHO MEAS - FS: 26 %
BH CV ECHO MEAS - IVS/LVPW: 1.22 CM
BH CV ECHO MEAS - IVSD: 1.57 CM
BH CV ECHO MEAS - LA DIMENSION: 4.7 CM
BH CV ECHO MEAS - LAT PEAK E' VEL: 15 CM/SEC
BH CV ECHO MEAS - LV MAX PG: 2.02 MMHG
BH CV ECHO MEAS - LV MEAN PG: 0.95 MMHG
BH CV ECHO MEAS - LV V1 MAX: 70.5 CM/SEC
BH CV ECHO MEAS - LV V1 VTI: 17.8 CM
BH CV ECHO MEAS - LVIDD: 5.1 CM
BH CV ECHO MEAS - LVIDS: 3.8 CM
BH CV ECHO MEAS - LVOT AREA: 3.1 CM2
BH CV ECHO MEAS - LVOT DIAM: 2 CM
BH CV ECHO MEAS - LVPWD: 1.29 CM
BH CV ECHO MEAS - MED PEAK E' VEL: 14 CM/SEC
BH CV ECHO MEAS - MV A MAX VEL: 95 CM/SEC
BH CV ECHO MEAS - MV E MAX VEL: 108 CM/SEC
BH CV ECHO MEAS - MV E/A: 1.14
BH CV ECHO MEAS - MV MAX PG: 4.6 MMHG
BH CV ECHO MEAS - MV MEAN PG: 1.8 MMHG
BH CV ECHO MEAS - MV V2 VTI: 44.8 CM
BH CV ECHO MEAS - PA ACC TIME: 0.05 SEC
BH CV ECHO MEAS - PA V2 MAX: 107 CM/SEC
BH CV ECHO MEAS - TAPSE (>1.6): 2.2 CM
BH CV ECHO MEASUREMENTS AVERAGE E/E' RATIO: 7.45
BH CV VAS BP RIGHT ARM: NORMAL MMHG
BH CV XLRA - RV BASE: 5.73 CM
BH CV XLRA - RV LENGTH: 11.03 CM
BH CV XLRA - RV MID: 4.51 CM
BH CV XLRA - TDI S': 20 CM/SEC
IVRT: 72 MS
LEFT ATRIUM VOLUME INDEX: 55.3 ML/M2

## 2024-05-10 PROCEDURE — 93306 TTE W/DOPPLER COMPLETE: CPT

## 2024-05-13 ENCOUNTER — TELEPHONE (OUTPATIENT)
Dept: CARDIOLOGY | Facility: CLINIC | Age: 64
End: 2024-05-13
Payer: MEDICARE

## 2024-05-13 DIAGNOSIS — I50.22 CHRONIC SYSTOLIC CONGESTIVE HEART FAILURE: ICD-10-CM

## 2024-05-13 DIAGNOSIS — I10 ESSENTIAL HYPERTENSION: ICD-10-CM

## 2024-05-13 RX ORDER — CARVEDILOL 6.25 MG/1
6.25 TABLET ORAL 2 TIMES DAILY WITH MEALS
Qty: 180 TABLET | Refills: 3 | Status: SHIPPED | OUTPATIENT
Start: 2024-05-13

## 2024-05-13 RX ORDER — SPIRONOLACTONE 25 MG/1
25 TABLET ORAL DAILY
Qty: 90 TABLET | Refills: 3 | Status: SHIPPED | OUTPATIENT
Start: 2024-05-13

## 2024-05-13 NOTE — TELEPHONE ENCOUNTER
Per [facility]- Good echo results, no significant change compared to 2022.     Spoke with pt regarding echo results. He is still experiencing worsening SOB appt with pulmonary  in August. Gave pt their office number to call for any sooner appts. He mentions swelling as well, he tries to elevate throughout the day but not often. States swelling is improved in the morning after propping legs up on pillows while sleeping. Instructed pt to elevate legs more throughout the day. He has not tried compression stockings yet. Instructed pt to get some from his pharmacy and wear them as much as possible as well as a low sodium diet. Pt verbalized understanding and agreeable to plan

## 2024-05-20 NOTE — TELEPHONE ENCOUNTER
Caller: Alejo Condon    Relationship: Self    Best call back number: [phone]     Requested Prescriptions:   Requested Prescriptions     Pending Prescriptions Disp Refills    meloxicam (MOBIC) 7.5 MG tablet 90 tablet 0     Sig: Take 1 tablet by mouth Daily.    hydrOXYzine (ATARAX) 50 MG tablet 90 tablet 1     Sig: Take 1 tablet by mouth every night at bedtime.      Pharmacy where request should be sent: [facility] [id number]  AT [facility] [address] - [phone]  - [phone]     Last office visit with prescribing clinician: 4/11/2024   Last telemedicine visit with prescribing clinician: Visit date not found   Next office visit with prescribing clinician: 6/11/2024     Does the patient have less than a 3 day supply:  [x] Yes  [] No    Lillian Milian Rep   05/20/24 13:12 EDT

## 2024-05-21 ENCOUNTER — TELEPHONE (OUTPATIENT)
Dept: CARDIOLOGY | Facility: CLINIC | Age: 64
End: 2024-05-21
Payer: MEDICARE

## 2024-05-21 NOTE — TELEPHONE ENCOUNTER
Caller Name: Alejo Condon      Relationship: Self      Best Contact Number: [phone]       Patient is requesting samples of JARDIANCE 10 MG       How many days of medication do you have left? 6 DAYS REMAINING         Additional Information: PATIENT CALLED IN TO SEE IF WE HAVE ANY JARDIANCE 10 MG SAMPLES. PATIENT HAS 6 DAYS REMAINING. PATIENT STATED MEDICATION IS $700 A MONTH AND HE CAN'T AFFORD IT.

## 2024-05-22 RX ORDER — HYDROXYZINE 50 MG/1
50 TABLET, FILM COATED ORAL
Qty: 90 TABLET | Refills: 1 | OUTPATIENT
Start: 2024-05-22

## 2024-05-22 NOTE — TELEPHONE ENCOUNTER
Rx Refill Note  Requested Prescriptions     Pending Prescriptions Disp Refills    meloxicam (MOBIC) 7.5 MG tablet 90 tablet 0     Sig: Take 1 tablet by mouth Daily.     Refused Prescriptions Disp Refills    hydrOXYzine (ATARAX) 50 MG tablet 90 tablet 1     Sig: Take 1 tablet by mouth every night at bedtime.     Refused By: ARMAND MORTON     Reason for Refusal: Refill not appropriate      Last office visit with prescribing clinician: 4/11/2024   Last telemedicine visit with prescribing clinician: Visit date not found   Next office visit with prescribing clinician: 6/11/2024                         Would you like a call back once the refill request has been completed: [] Yes [] No    If the office needs to give you a call back, can they leave a voicemail: [] Yes [] No    Armand Morton MA  05/22/24, 13:14 EDTRx Refill Note  Requested Prescriptions     Pending Prescriptions Disp Refills    meloxicam (MOBIC) 7.5 MG tablet 90 tablet 0     Sig: Take 1 tablet by mouth Daily.    hydrOXYzine (ATARAX) 50 MG tablet 90 tablet 1     Sig: Take 1 tablet by mouth every night at bedtime.      Last office visit with prescribing clinician: 4/11/2024   Last telemedicine visit with prescribing clinician: Visit date not found   Next office visit with prescribing clinician: 6/11/2024                         Would you like a call back once the refill request has been completed: [] Yes [] No    If the office needs to give you a call back, can they leave a voicemail: [] Yes [] No    Armand Morton MA  05/22/24, 13:13 EDT

## 2024-05-23 RX ORDER — MELOXICAM 7.5 MG/1
7.5 TABLET ORAL DAILY
Qty: 90 TABLET | Refills: 0 | Status: SHIPPED | OUTPATIENT
Start: 2024-05-23

## 2024-05-29 ENCOUNTER — OFFICE VISIT (OUTPATIENT)
Dept: FAMILY MEDICINE CLINIC | Facility: CLINIC | Age: 64
End: 2024-05-29
Payer: MEDICARE

## 2024-05-29 VITALS
TEMPERATURE: 97.8 F | RESPIRATION RATE: 20 BRPM | OXYGEN SATURATION: 90 % | HEART RATE: 56 BPM | SYSTOLIC BLOOD PRESSURE: 115 MMHG | BODY MASS INDEX: 37.92 KG/M2 | WEIGHT: 256 LBS | HEIGHT: 69 IN | DIASTOLIC BLOOD PRESSURE: 65 MMHG

## 2024-05-29 DIAGNOSIS — Z76.0 ENCOUNTER FOR MEDICATION REFILL: ICD-10-CM

## 2024-05-29 DIAGNOSIS — R53.83 FATIGUE, UNSPECIFIED TYPE: ICD-10-CM

## 2024-05-29 DIAGNOSIS — M79.89 SWELLING OF LOWER LEG: Primary | ICD-10-CM

## 2024-05-29 PROCEDURE — 3074F SYST BP LT 130 MM HG: CPT | Performed by: PSYCHOLOGIST

## 2024-05-29 PROCEDURE — 1125F AMNT PAIN NOTED PAIN PRSNT: CPT | Performed by: PSYCHOLOGIST

## 2024-05-29 PROCEDURE — 99213 OFFICE O/P EST LOW 20 MIN: CPT | Performed by: PSYCHOLOGIST

## 2024-05-29 PROCEDURE — 96372 THER/PROPH/DIAG INJ SC/IM: CPT | Performed by: PSYCHOLOGIST

## 2024-05-29 PROCEDURE — 3078F DIAST BP <80 MM HG: CPT | Performed by: PSYCHOLOGIST

## 2024-05-29 PROCEDURE — 1160F RVW MEDS BY RX/DR IN RCRD: CPT | Performed by: PSYCHOLOGIST

## 2024-05-29 PROCEDURE — 1159F MED LIST DOCD IN RCRD: CPT | Performed by: PSYCHOLOGIST

## 2024-05-29 RX ORDER — PREDNISONE 20 MG/1
20 TABLET ORAL
Qty: 10 TABLET | Refills: 0 | Status: SHIPPED | OUTPATIENT
Start: 2024-05-29 | End: 2024-06-08

## 2024-05-29 RX ORDER — OMEPRAZOLE 40 MG/1
40 CAPSULE, DELAYED RELEASE ORAL DAILY
COMMUNITY
Start: 2024-05-24

## 2024-05-29 RX ORDER — FLUTICASONE FUROATE, UMECLIDINIUM BROMIDE AND VILANTEROL TRIFENATATE 200; 62.5; 25 UG/1; UG/1; UG/1
1 POWDER RESPIRATORY (INHALATION)
Qty: 28 EACH | Refills: 0 | COMMUNITY
Start: 2024-05-29 | End: 2024-06-26

## 2024-05-29 RX ORDER — DEXAMETHASONE SODIUM PHOSPHATE 4 MG/ML
8 INJECTION, SOLUTION INTRA-ARTICULAR; INTRALESIONAL; INTRAMUSCULAR; INTRAVENOUS; SOFT TISSUE ONCE
Status: COMPLETED | OUTPATIENT
Start: 2024-05-29 | End: 2024-05-29

## 2024-05-29 RX ADMIN — DEXAMETHASONE SODIUM PHOSPHATE 8 MG: 4 INJECTION, SOLUTION INTRA-ARTICULAR; INTRALESIONAL; INTRAMUSCULAR; INTRAVENOUS; SOFT TISSUE at 11:38

## 2024-05-29 NOTE — PROGRESS NOTES
"Chief Complaint  Leg Swelling (BOTH LEGS ARE SWELLING FOR A COUPLE OF MONTHS. /), LEASON ON LEG  (RIGHT LEG. STARTED OUT LITTLE AND NOW IT HAS GOT BIGGER. ), and Fatigue (INTERESTED IN VIT D )    Subjective        Alejo Condon presents to [facility]  C/O LOWER LEG SWELLING BILATERALLY RIGHT WORSE THAN RIGHT/no soa/no cp :  Leg Swelling  This is a recurrent problem. The current episode started more than 1 month ago. The problem occurs intermittently. The problem has been gradually worsening. Associated symptoms include fatigue and joint swelling. He has tried acetaminophen for the symptoms.   Fatigue  This is a recurrent problem. The current episode started 1 to 4 weeks ago. The problem occurs intermittently. The problem has been gradually worsening. Associated symptoms include fatigue and joint swelling. He has tried nothing for the symptoms.       Objective   Vital Signs:  /65 (BP Location: Right arm, Patient Position: Sitting, Cuff Size: Adult)   Pulse 56   Temp 97.8 °F (36.6 °C) (Temporal)   Resp 20   Ht 175 cm (68.9\")   Wt 116 kg (256 lb)   SpO2 90%   BMI 37.91 kg/m²   Estimated body mass index is 37.91 kg/m² as calculated from the following:    Height as of this encounter: 175 cm (68.9\").    Weight as of this encounter: 116 kg (256 lb).            Physical Exam  Vitals and nursing note reviewed.   Constitutional:       Appearance: Normal appearance. He is obese.   HENT:      Head: Normocephalic.      Right Ear: Tympanic membrane normal. There is impacted cerumen.      Left Ear: Tympanic membrane normal. There is impacted cerumen.      Nose: Nose normal.      Mouth/Throat:      Mouth: Mucous membranes are moist.   Eyes:      Extraocular Movements: Extraocular movements intact.      Pupils: Pupils are equal, round, and reactive to light.   Cardiovascular:      Rate and Rhythm: Normal rate and regular rhythm.      Pulses: Normal pulses.      Heart sounds: Normal " heart sounds.   Pulmonary:      Effort: Pulmonary effort is normal.      Breath sounds: Normal breath sounds.   Abdominal:      General: Abdomen is protuberant. Bowel sounds are normal.      Palpations: Abdomen is soft.   Musculoskeletal:         General: Normal range of motion.      Cervical back: Normal range of motion and neck supple.   Skin:     General: Skin is warm.      Capillary Refill: Capillary refill takes less than 2 seconds.   Neurological:      General: No focal deficit present.      Mental Status: He is alert and oriented to person, place, and time.   Psychiatric:         Mood and Affect: Mood normal.        Result Review :  The following data was reviewed by: Prince Hernández MD on 05/29/2024:  Common labs          7/18/2023    11:35 9/28/2023    15:51   Common Labs   Glucose 102  97    BUN 20  19    Creatinine 1.02  0.92    Sodium 138  143    Potassium 4.3  3.9    Chloride 102  105    Calcium 9.0  9.6    Albumin 4.3  4.5    Total Bilirubin 0.3  0.5    Alkaline Phosphatase 65  68    AST (SGOT) 17  19    ALT (SGPT) 13  21    WBC  5.88    Hemoglobin  14.4    Hematocrit  42.5    Platelets  207    Total Cholesterol 228  172    Triglycerides 191  91    HDL Cholesterol 65  67    LDL Cholesterol  130  88    Hemoglobin A1C  6.20    PSA  2.530               Assessment and Plan   Diagnoses and all orders for this visit:    1. Swelling of lower leg (Primary)  -     US Venous Doppler Lower Extremity Right (duplex); Future  -     dexAMETHasone (DECADRON) injection 8 mg    2. Fatigue, unspecified type    3. Encounter for medication refill    Other orders  -     Fluticasone-Umeclidin-Vilant (Trelegy Ellipta) 200-62.5-25 MCG/ACT inhaler; Inhale 1 puff Daily for 28 days. 2 BOXES NDC # [id number] LOT# [id number]  EXP 10/25  Dispense: 28 each; Refill: 0  -     predniSONE (DELTASONE) 20 MG tablet; Take 1 tablet by mouth Daily With Lunch for 10 days.  Dispense: 10 tablet; Refill: 0           I spent 20 minutes caring for  Alejo on this date of service. This time includes time spent by me in the following activities:reviewing tests, obtaining and/or reviewing a separately obtained history, performing a medically appropriate examination and/or evaluation , counseling and educating the patient/family/caregiver, ordering medications, tests, or procedures, and documenting information in the medical record  Follow Up   No follow-ups on file.  Patient was given instructions and counseling regarding his condition or for health maintenance advice. Please see specific information pulled into the AVS if appropriate.           This document has been electronically signed by Prince Hernández MD  May 29, 2024 11:20 EDT

## 2024-05-29 NOTE — PROGRESS NOTES
Injection Dexamethasone  Injection performed in Left Ventrogluteal by Milagros Morton MA. Patient tolerated the procedure well without complications. Allergies verified. Pt denied to wait 15 minutes post injection.   05/29/24   Milargos Morton MA

## 2024-06-10 ENCOUNTER — TELEPHONE (OUTPATIENT)
Dept: FAMILY MEDICINE CLINIC | Facility: CLINIC | Age: 64
End: 2024-06-10
Payer: MEDICARE

## 2024-06-10 NOTE — TELEPHONE ENCOUNTER
"Relay     \"Hello, we were trying to reach you to get your appointment that was on 6/11/24 rescheduled to your earliest convenience. \"                "

## 2024-06-11 ENCOUNTER — TELEPHONE (OUTPATIENT)
Dept: FAMILY MEDICINE CLINIC | Facility: CLINIC | Age: 64
End: 2024-06-11
Payer: MEDICARE

## 2024-06-11 ENCOUNTER — OFFICE VISIT (OUTPATIENT)
Dept: FAMILY MEDICINE CLINIC | Facility: CLINIC | Age: 64
End: 2024-06-11
Payer: MEDICARE

## 2024-06-11 VITALS
HEART RATE: 74 BPM | BODY MASS INDEX: 38.36 KG/M2 | TEMPERATURE: 96.4 F | OXYGEN SATURATION: 98 % | HEIGHT: 69 IN | DIASTOLIC BLOOD PRESSURE: 74 MMHG | SYSTOLIC BLOOD PRESSURE: 138 MMHG | WEIGHT: 259 LBS | RESPIRATION RATE: 18 BRPM

## 2024-06-11 DIAGNOSIS — F41.9 ANXIETY: ICD-10-CM

## 2024-06-11 DIAGNOSIS — R73.03 PRE-DIABETES: ICD-10-CM

## 2024-06-11 DIAGNOSIS — L98.9 SKIN LESION OF RIGHT LEG: ICD-10-CM

## 2024-06-11 DIAGNOSIS — R53.83 FATIGUE, UNSPECIFIED TYPE: ICD-10-CM

## 2024-06-11 DIAGNOSIS — I10 PRIMARY HYPERTENSION: Primary | ICD-10-CM

## 2024-06-11 DIAGNOSIS — I50.21 ACUTE SYSTOLIC CHF (CONGESTIVE HEART FAILURE): ICD-10-CM

## 2024-06-11 DIAGNOSIS — E66.01 CLASS 2 SEVERE OBESITY WITH SERIOUS COMORBIDITY AND BODY MASS INDEX (BMI) OF 38.0 TO 38.9 IN ADULT, UNSPECIFIED OBESITY TYPE: ICD-10-CM

## 2024-06-11 DIAGNOSIS — E78.2 MIXED HYPERLIPIDEMIA: ICD-10-CM

## 2024-06-11 PROBLEM — E66.812 CLASS 2 SEVERE OBESITY WITH SERIOUS COMORBIDITY AND BODY MASS INDEX (BMI) OF 38.0 TO 38.9 IN ADULT: Status: ACTIVE | Noted: 2024-06-11

## 2024-06-11 PROBLEM — R14.0 BLOATING: Status: RESOLVED | Noted: 2023-08-31 | Resolved: 2024-06-11

## 2024-06-11 PROBLEM — Z87.891 FORMER SMOKER: Status: ACTIVE | Noted: 2024-06-11

## 2024-06-11 PROBLEM — R10.13 EPIGASTRIC PAIN: Status: RESOLVED | Noted: 2023-08-31 | Resolved: 2024-06-11

## 2024-06-11 LAB
ANION GAP SERPL CALCULATED.3IONS-SCNC: 9.9 MMOL/L (ref 5–15)
BASOPHILS # BLD AUTO: 0.05 10*3/MM3 (ref 0–0.2)
BASOPHILS NFR BLD AUTO: 0.5 % (ref 0–1.5)
BUN SERPL-MCNC: 16 MG/DL (ref 8–23)
BUN/CREAT SERPL: 20.8 (ref 7–25)
CALCIUM SPEC-SCNC: 9.2 MG/DL (ref 8.6–10.5)
CHLORIDE SERPL-SCNC: 105 MMOL/L (ref 98–107)
CO2 SERPL-SCNC: 25.1 MMOL/L (ref 22–29)
CREAT SERPL-MCNC: 0.77 MG/DL (ref 0.76–1.27)
DEPRECATED RDW RBC AUTO: 47.1 FL (ref 37–54)
EGFRCR SERPLBLD CKD-EPI 2021: 99.4 ML/MIN/1.73
EOSINOPHIL # BLD AUTO: 0.15 10*3/MM3 (ref 0–0.4)
EOSINOPHIL NFR BLD AUTO: 1.5 % (ref 0.3–6.2)
ERYTHROCYTE [DISTWIDTH] IN BLOOD BY AUTOMATED COUNT: 14.7 % (ref 12.3–15.4)
EXPIRATION DATE: ABNORMAL
GLUCOSE SERPL-MCNC: 136 MG/DL (ref 65–99)
HBA1C MFR BLD: 6.4 % (ref 4.5–5.7)
HCT VFR BLD AUTO: 45.2 % (ref 37.5–51)
HGB BLD-MCNC: 14.4 G/DL (ref 13–17.7)
IMM GRANULOCYTES # BLD AUTO: 0.05 10*3/MM3 (ref 0–0.05)
IMM GRANULOCYTES NFR BLD AUTO: 0.5 % (ref 0–0.5)
LYMPHOCYTES # BLD AUTO: 1.75 10*3/MM3 (ref 0.7–3.1)
LYMPHOCYTES NFR BLD AUTO: 17.6 % (ref 19.6–45.3)
Lab: ABNORMAL
MCH RBC QN AUTO: 27.7 PG (ref 26.6–33)
MCHC RBC AUTO-ENTMCNC: 31.9 G/DL (ref 31.5–35.7)
MCV RBC AUTO: 87.1 FL (ref 79–97)
MONOCYTES # BLD AUTO: 0.42 10*3/MM3 (ref 0.1–0.9)
MONOCYTES NFR BLD AUTO: 4.2 % (ref 5–12)
NEUTROPHILS NFR BLD AUTO: 7.54 10*3/MM3 (ref 1.7–7)
NEUTROPHILS NFR BLD AUTO: 75.7 % (ref 42.7–76)
NRBC BLD AUTO-RTO: 0 /100 WBC (ref 0–0.2)
PLATELET # BLD AUTO: 187 10*3/MM3 (ref 140–450)
PMV BLD AUTO: 10.9 FL (ref 6–12)
POTASSIUM SERPL-SCNC: 4.2 MMOL/L (ref 3.5–5.2)
RBC # BLD AUTO: 5.19 10*6/MM3 (ref 4.14–5.8)
SODIUM SERPL-SCNC: 140 MMOL/L (ref 136–145)
TSH SERPL DL<=0.05 MIU/L-ACNC: 1.69 UIU/ML (ref 0.27–4.2)
WBC NRBC COR # BLD AUTO: 9.96 10*3/MM3 (ref 3.4–10.8)

## 2024-06-11 PROCEDURE — 99214 OFFICE O/P EST MOD 30 MIN: CPT | Performed by: FAMILY MEDICINE

## 2024-06-11 PROCEDURE — 1125F AMNT PAIN NOTED PAIN PRSNT: CPT | Performed by: FAMILY MEDICINE

## 2024-06-11 PROCEDURE — 85025 COMPLETE CBC W/AUTO DIFF WBC: CPT | Performed by: FAMILY MEDICINE

## 2024-06-11 PROCEDURE — 3075F SYST BP GE 130 - 139MM HG: CPT | Performed by: FAMILY MEDICINE

## 2024-06-11 PROCEDURE — 80048 BASIC METABOLIC PNL TOTAL CA: CPT | Performed by: FAMILY MEDICINE

## 2024-06-11 PROCEDURE — 1160F RVW MEDS BY RX/DR IN RCRD: CPT | Performed by: FAMILY MEDICINE

## 2024-06-11 PROCEDURE — 83036 HEMOGLOBIN GLYCOSYLATED A1C: CPT | Performed by: FAMILY MEDICINE

## 2024-06-11 PROCEDURE — 3044F HG A1C LEVEL LT 7.0%: CPT | Performed by: FAMILY MEDICINE

## 2024-06-11 PROCEDURE — 1159F MED LIST DOCD IN RCRD: CPT | Performed by: FAMILY MEDICINE

## 2024-06-11 PROCEDURE — 3078F DIAST BP <80 MM HG: CPT | Performed by: FAMILY MEDICINE

## 2024-06-11 PROCEDURE — 84443 ASSAY THYROID STIM HORMONE: CPT | Performed by: FAMILY MEDICINE

## 2024-06-11 PROCEDURE — 96372 THER/PROPH/DIAG INJ SC/IM: CPT | Performed by: FAMILY MEDICINE

## 2024-06-11 RX ORDER — SERTRALINE HYDROCHLORIDE 100 MG/1
100 TABLET, FILM COATED ORAL
Qty: 90 TABLET | Refills: 0 | Status: SHIPPED | OUTPATIENT
Start: 2024-06-11 | End: 2024-09-09

## 2024-06-11 RX ORDER — CYANOCOBALAMIN 1000 UG/ML
1000 INJECTION, SOLUTION INTRAMUSCULAR; SUBCUTANEOUS
Status: SHIPPED | OUTPATIENT
Start: 2024-06-11

## 2024-06-11 RX ADMIN — CYANOCOBALAMIN 1000 MCG: 1000 INJECTION, SOLUTION INTRAMUSCULAR; SUBCUTANEOUS at 10:41

## 2024-06-11 NOTE — TELEPHONE ENCOUNTER
"Relay     \"Hello, we were trying to reach you to get your appointment that was on   6/11/2024     rescheduled to your earliest convenience.\"                "

## 2024-06-11 NOTE — PROGRESS NOTES
Injection vitamin B12  Injection performed in Left ventrogluteal  by Milagros Morton MA. Patient tolerated the procedure well without complications. Allergies reviewed. Pt waited 15 minutes post injection.   06/11/24   Milagros Morton MA

## 2024-06-11 NOTE — PROGRESS NOTES
"Chief Complaint  Follow-up (On labs and bilateral leg swelling pt request a referral for portable oxygen )    Subjective        Alejo Condon presents to [facility]  History of Present Illness  Patient is 64 y.o. male who presents today for follow-up on chronic medical problems including hypertension, hyperlipidemia, and CHF. Patient denies adverse effects of medications, headache, dizziness, chest pain, palpitations, shortness of breath, cough, nausea, vomiting, abdominal pain, muscle aches, and joint pain. Patient complains of fatigue. Patient is here for monitoring of chronic issues due to need for monitoring of renal function, liver function, adverse effects, and side effects. He states that he is always feeling tired, this has been going on for about a year.        Objective   Vital Signs:  /74 (BP Location: Left arm, Patient Position: Sitting, Cuff Size: Adult)   Pulse 74   Temp 96.4 °F (35.8 °C) (Temporal)   Resp 18   Ht 175 cm (68.9\")   Wt 117 kg (259 lb)   SpO2 98%   BMI 38.36 kg/m²   Estimated body mass index is 38.36 kg/m² as calculated from the following:    Height as of this encounter: 175 cm (68.9\").    Weight as of this encounter: 117 kg (259 lb).               Physical Exam  Vitals and nursing note reviewed.   Constitutional:       General: He is not in acute distress.     Appearance: Normal appearance. He is well-developed and well-groomed. He is obese.   HENT:      Head: Normocephalic and atraumatic.      Jaw: No tenderness or pain on movement.      Salivary Glands: Right salivary gland is not diffusely enlarged. Left salivary gland is not diffusely enlarged.      Right Ear: Tympanic membrane and ear canal normal.      Left Ear: Tympanic membrane and ear canal normal.      Nose: No congestion or rhinorrhea.      Mouth/Throat:      Mouth: Mucous membranes are moist.      Pharynx: No oropharyngeal exudate or posterior oropharyngeal erythema.   Eyes:      " General: Lids are normal. No allergic shiner or scleral icterus.     Conjunctiva/sclera: Conjunctivae normal.      Pupils: Pupils are equal, round, and reactive to light.   Neck:      Thyroid: No thyroid mass, thyromegaly or thyroid tenderness.      Trachea: Trachea normal.   Cardiovascular:      Rate and Rhythm: Normal rate and regular rhythm. No extrasystoles are present.     Pulses: Normal pulses.      Heart sounds: Normal heart sounds. No murmur heard.  Pulmonary:      Effort: Pulmonary effort is normal. No respiratory distress.      Breath sounds: Normal breath sounds. No decreased breath sounds, wheezing, rhonchi or rales.   Chest:   Breasts:     Right: Normal.      Left: Normal.   Abdominal:      General: Abdomen is protuberant. Bowel sounds are normal.      Palpations: Abdomen is soft.      Tenderness: There is no abdominal tenderness. There is no right CVA tenderness, left CVA tenderness, guarding or rebound.   Musculoskeletal:      Cervical back: Neck supple.      Right lower leg: No edema.      Left lower leg: No edema.   Lymphadenopathy:      Cervical: No cervical adenopathy.      Upper Body:      Right upper body: No supraclavicular or axillary adenopathy.      Left upper body: No supraclavicular or axillary adenopathy.   Skin:     General: Skin is warm.      Findings: Rash: right lower leg: none erythematous, flat, round rash with scaling, non tender.      Nails: There is no clubbing.   Neurological:      General: No focal deficit present.      Mental Status: He is alert and oriented to person, place, and time.      Sensory: Sensation is intact.      Motor: Motor function is intact.      Coordination: Coordination is intact.   Psychiatric:         Attention and Perception: Attention and perception normal.         Mood and Affect: Mood normal.         Speech: Speech normal.         Behavior: Behavior normal. Behavior is cooperative.         Thought Content: Thought content normal.        Result Review  :                     Assessment and Plan     Diagnoses and all orders for this visit:    1. Primary hypertension (Primary)  Assessment & Plan:  Hypertension is stable and controlled  Continue current treatment regimen.  Dietary sodium restriction.  Weight loss.  Blood pressure will be reassessed in 3 months.      2. Mixed hyperlipidemia  Assessment & Plan:   Lipid abnormalities are stable    Plan:  Continue same medication/s without change.      Discussed medication dosage, use, side effects, and goals of treatment in detail.    Counseled patient on lifestyle modifications to help control hyperlipidemia.     Patient Treatment Goals:   LDL goal is less than 70    Followup in 6 months.      3. Acute systolic CHF (congestive heart failure)  Assessment & Plan:  Stable. Follows with Cardiology.          4. Pre-diabetes  Assessment & Plan:  Will recheck A1c.    Orders:  -     POC Glycosylated Hemoglobin (Hb A1C)    5. Class 2 severe obesity with serious comorbidity and body mass index (BMI) of 38.0 to 38.9 in adult, unspecified obesity type  Assessment & Plan:  Patient's (Body mass index is 38.36 kg/m².) indicates that they are morbidly/severely obese (BMI > 40 or > 35 with obesity - related health condition) with health conditions that include hypertension, dyslipidemias, and CHF  . Weight is unchanged. BMI  is above average; BMI management plan is completed. We discussed portion control and increasing exercise.       6. Anxiety  Assessment & Plan:  Stable on Zoloft    Orders:  -     sertraline (ZOLOFT) 100 MG tablet; Take 1 tablet by mouth Daily With Dinner for 90 days.  Dispense: 90 tablet; Refill: 0    7. Skin lesion of right leg  Assessment & Plan:  Will refer to Dermatology.    Orders:  -     Ambulatory Referral to Dermatology    8. Fatigue, unspecified type  Comments:  1 year  Orders:  -     Basic metabolic panel  -     CBC & Differential  -     TSH  -     cyanocobalamin injection 1,000 mcg    9.  Anxiety  Comments:  Med increased today  Assessment & Plan:  Stable on Zoloft    Orders:  -     sertraline (ZOLOFT) 100 MG tablet; Take 1 tablet by mouth Daily With Dinner for 90 days.  Dispense: 90 tablet; Refill: 0           I spent 20 minutes caring for Alejo on this date of service. This time includes time spent by me in the following activities:preparing for the visit, performing a medically appropriate examination and/or evaluation , counseling and educating the patient/family/caregiver, ordering medications, tests, or procedures, and documenting information in the medical record  Follow Up     Return in about 3 months (around 9/11/2024).  Patient was given instructions and counseling regarding his condition or for health maintenance advice. Please see specific information pulled into the AVS if appropriate.     The patient is advised to continue all of his regular medications as prescribed. He was counseled regarding the importance of diet, exercise and medication compliance.      This document has been electronically signed by Kareem Varghese MD  June 11, 2024 22:56 EDT

## 2024-06-12 NOTE — ASSESSMENT & PLAN NOTE
Patient's (Body mass index is 38.36 kg/m².) indicates that they are morbidly/severely obese (BMI > 40 or > 35 with obesity - related health condition) with health conditions that include hypertension, dyslipidemias, and CHF  . Weight is unchanged. BMI  is above average; BMI management plan is completed. We discussed portion control and increasing exercise.

## 2024-06-16 PROCEDURE — 87086 URINE CULTURE/COLONY COUNT: CPT | Performed by: NURSE PRACTITIONER

## 2024-07-01 RX ORDER — ATORVASTATIN CALCIUM 20 MG/1
20 TABLET, FILM COATED ORAL NIGHTLY
Qty: 90 TABLET | Refills: 0 | Status: SHIPPED | OUTPATIENT
Start: 2024-07-01

## 2024-07-30 ENCOUNTER — OFFICE VISIT (OUTPATIENT)
Dept: FAMILY MEDICINE CLINIC | Facility: CLINIC | Age: 64
End: 2024-07-30
Payer: MEDICARE

## 2024-07-30 VITALS
SYSTOLIC BLOOD PRESSURE: 139 MMHG | WEIGHT: 267 LBS | HEIGHT: 69 IN | RESPIRATION RATE: 18 BRPM | TEMPERATURE: 97 F | DIASTOLIC BLOOD PRESSURE: 75 MMHG | BODY MASS INDEX: 39.55 KG/M2 | OXYGEN SATURATION: 95 % | HEART RATE: 73 BPM

## 2024-07-30 DIAGNOSIS — Z76.0 ENCOUNTER FOR MEDICATION REFILL: ICD-10-CM

## 2024-07-30 DIAGNOSIS — I50.22 CHRONIC SYSTOLIC CONGESTIVE HEART FAILURE: ICD-10-CM

## 2024-07-30 DIAGNOSIS — F41.9 ANXIETY: ICD-10-CM

## 2024-07-30 DIAGNOSIS — I10 ESSENTIAL HYPERTENSION: ICD-10-CM

## 2024-07-30 DIAGNOSIS — E11.65 TYPE 2 DIABETES MELLITUS WITH HYPERGLYCEMIA, WITHOUT LONG-TERM CURRENT USE OF INSULIN: ICD-10-CM

## 2024-07-30 DIAGNOSIS — L98.9 SKIN LESION OF RIGHT LEG: ICD-10-CM

## 2024-07-30 PROCEDURE — 1160F RVW MEDS BY RX/DR IN RCRD: CPT | Performed by: PSYCHOLOGIST

## 2024-07-30 PROCEDURE — 3078F DIAST BP <80 MM HG: CPT | Performed by: PSYCHOLOGIST

## 2024-07-30 PROCEDURE — 99214 OFFICE O/P EST MOD 30 MIN: CPT | Performed by: PSYCHOLOGIST

## 2024-07-30 PROCEDURE — 1159F MED LIST DOCD IN RCRD: CPT | Performed by: PSYCHOLOGIST

## 2024-07-30 PROCEDURE — 96372 THER/PROPH/DIAG INJ SC/IM: CPT | Performed by: PSYCHOLOGIST

## 2024-07-30 PROCEDURE — 1125F AMNT PAIN NOTED PAIN PRSNT: CPT | Performed by: PSYCHOLOGIST

## 2024-07-30 PROCEDURE — 3075F SYST BP GE 130 - 139MM HG: CPT | Performed by: PSYCHOLOGIST

## 2024-07-30 PROCEDURE — 3044F HG A1C LEVEL LT 7.0%: CPT | Performed by: PSYCHOLOGIST

## 2024-07-30 RX ORDER — FLUTICASONE FUROATE, UMECLIDINIUM BROMIDE AND VILANTEROL TRIFENATATE 100; 62.5; 25 UG/1; UG/1; UG/1
1 POWDER RESPIRATORY (INHALATION)
Qty: 56 EACH | Refills: 0 | COMMUNITY
Start: 2024-07-30

## 2024-07-30 RX ORDER — MELOXICAM 15 MG/1
15 TABLET ORAL
Qty: 90 TABLET | Refills: 3 | Status: SHIPPED | OUTPATIENT
Start: 2024-07-30 | End: 2025-07-25

## 2024-07-30 RX ORDER — CLOTRIMAZOLE AND BETAMETHASONE DIPROPIONATE 10; .64 MG/G; MG/G
1 CREAM TOPICAL 2 TIMES DAILY
Qty: 45 G | Refills: 0 | Status: SHIPPED | OUTPATIENT
Start: 2024-07-30 | End: 2024-08-27

## 2024-07-30 RX ORDER — DEXAMETHASONE SODIUM PHOSPHATE 4 MG/ML
8 INJECTION, SOLUTION INTRA-ARTICULAR; INTRALESIONAL; INTRAMUSCULAR; INTRAVENOUS; SOFT TISSUE ONCE
Status: COMPLETED | OUTPATIENT
Start: 2024-07-30 | End: 2024-07-30

## 2024-07-30 RX ORDER — TRAZODONE HYDROCHLORIDE 50 MG/1
50 TABLET ORAL
Qty: 90 TABLET | Refills: 1 | Status: SHIPPED | OUTPATIENT
Start: 2024-07-30

## 2024-07-30 RX ORDER — LOSARTAN POTASSIUM 50 MG/1
50 TABLET ORAL
Qty: 90 TABLET | Refills: 3 | Status: SHIPPED | OUTPATIENT
Start: 2024-07-30

## 2024-07-30 RX ORDER — OMEPRAZOLE 40 MG/1
40 CAPSULE, DELAYED RELEASE ORAL
Qty: 90 CAPSULE | Refills: 1 | Status: SHIPPED | OUTPATIENT
Start: 2024-07-30 | End: 2025-01-26

## 2024-07-30 RX ADMIN — DEXAMETHASONE SODIUM PHOSPHATE 8 MG: 4 INJECTION, SOLUTION INTRA-ARTICULAR; INTRALESIONAL; INTRAMUSCULAR; INTRAVENOUS; SOFT TISSUE at 10:47

## 2024-07-30 RX ADMIN — CYANOCOBALAMIN 1000 MCG: 1000 INJECTION, SOLUTION INTRAMUSCULAR; SUBCUTANEOUS at 10:50

## 2024-07-30 NOTE — PROGRESS NOTES
"Chief Complaint  Follow-up (Spot on right leg )    Subjective        Alejo Condon presents to [facility]  C/O AN ACUTE MEDICAL ILLNESSS - RASH 2. FF/UP CHRONIC ILLNESS   Rash  This is a chronic problem. The current episode started more than 1 year ago. The problem has been worse since onset. The affected locations include the right lower leg. The rash is characterized by scaling, dryness and peeling. He was exposed to nothing. Associated symptoms include shortness of breath. Past treatments include nothing.   Follow-up  Conditions present:  Hypertension, Hyperlipidemia and COPD  Current symptoms: shortness of breath. Current symptoms include no chest pain, no headaches, no peripheral edema, no myalgias, no non-productive cough and no chest congestion. Medication compliance: good. Treatment compliance: all of the time. Treatment barriers: lack of exercise. Aggravating respiratory factors: pollens. Most recent home oxygen level percentage is 95%.   Hypertension: The problem is controlled.     End-organ damage: no angina, no kidney disease and no CAD/MI  Current antihypertension treatment: angiotensin blockers, direct vasodilators and diuretics. Past treatments: direct vasodilators, angiotensin blockers, lifestyle changes and diuretics.   Hyperlipidemia: Recent lipid tests were reviewed and are high. Exacerbating diseases: obesity. Current antihyperlipidemic treatment: statins, exercise and diet change.       Objective   Vital Signs:  /75 (BP Location: Left arm, Patient Position: Sitting, Cuff Size: Adult)   Pulse 73   Temp 97 °F (36.1 °C) (Temporal)   Resp 18   Ht 175 cm (68.9\")   Wt 121 kg (267 lb)   SpO2 95%   BMI 39.54 kg/m²   Estimated body mass index is 39.54 kg/m² as calculated from the following:    Height as of this encounter: 175 cm (68.9\").    Weight as of this encounter: 121 kg (267 lb).        Physical Exam  Vitals and nursing note reviewed.   Constitutional: "       Appearance: Normal appearance. He is obese.   HENT:      Head: Normocephalic.      Right Ear: Tympanic membrane normal.      Left Ear: Tympanic membrane normal.      Nose: Nose normal.      Mouth/Throat:      Mouth: Mucous membranes are moist.   Eyes:      Extraocular Movements: Extraocular movements intact.      Pupils: Pupils are equal, round, and reactive to light.   Cardiovascular:      Rate and Rhythm: Normal rate and regular rhythm.      Pulses: Normal pulses.      Heart sounds: Normal heart sounds.   Pulmonary:      Effort: Pulmonary effort is normal.      Breath sounds: Normal breath sounds.   Abdominal:      General: Abdomen is protuberant. Bowel sounds are normal.      Palpations: Abdomen is soft.   Musculoskeletal:         General: Normal range of motion.      Cervical back: Normal range of motion and neck supple.   Skin:     General: Skin is warm.      Capillary Refill: Capillary refill takes less than 2 seconds.      Findings: Rash present. Rash is scaling.      Comments: RLE -- SCALING DEMARCATED LESION BROWN IN COLOR . INCREASE IN SIZE   Neurological:      General: No focal deficit present.      Mental Status: He is alert and oriented to person, place, and time.   Psychiatric:         Mood and Affect: Mood normal.        Result Review :  The following data was reviewed by: Prince Hernández MD on 07/30/2024:  Common labs          9/28/2023    15:51 6/11/2024    10:47 6/11/2024    10:48   Common Labs   Glucose 97  136     BUN 19  16     Creatinine 0.92  0.77     Sodium 143  140     Potassium 3.9  4.2     Chloride 105  105     Calcium 9.6  9.2     Albumin 4.5      Total Bilirubin 0.5      Alkaline Phosphatase 68      AST (SGOT) 19      ALT (SGPT) 21      WBC 5.88  9.96     Hemoglobin 14.4  14.4     Hematocrit 42.5  45.2     Platelets 207  187     Total Cholesterol 172      Triglycerides 91      HDL Cholesterol 67      LDL Cholesterol  88      Hemoglobin A1C 6.20   6.4    PSA 2.530        Data  reviewed : Radiologic studies 5/29/24 DOPPLER US           Assessment and Plan   Diagnoses and all orders for this visit:    1. Type 2 diabetes mellitus with hyperglycemia, without long-term current use of insulin  Assessment & Plan:  Diabetes is WORSENING   Medication changes per orders.  Recommended an ADA diet.  Discussed ways to avoid symptomatic hypoglycemia.  Discussed sick day management.  Discussed foot care.  Reminded to get yearly retinal exam.  Ophthalmology/Optometry referral.    DENIES PMH PANCREATITIS FH OF MEDULLARY THYROID CANCER        Diabetes will be reassessed in 1 month    WILL START MOUNJARO AND CONTINUE JARDIANCE      2. Essential hypertension  -     losartan (COZAAR) 50 MG tablet; Take 1 tablet by mouth Daily With Breakfast.  Dispense: 90 tablet; Refill: 3    3. Chronic systolic congestive heart failure  -     losartan (COZAAR) 50 MG tablet; Take 1 tablet by mouth Daily With Breakfast.  Dispense: 90 tablet; Refill: 3    4. Anxiety  Comments:  med increased today  Orders:  -     traZODone (DESYREL) 50 MG tablet; Take 1 tablet by mouth every night at bedtime.  Dispense: 90 tablet; Refill: 1    5. Encounter for medication refill    6. Skin lesion of right leg  -     Ambulatory Referral to Dermatology  -     dexAMETHasone (DECADRON) injection 8 mg    Other orders  -     clotrimazole-betamethasone (LOTRISONE) 1-0.05 % cream; Apply 1 Application topically to the appropriate area as directed 2 (Two) Times a Day for 28 days.  Dispense: 45 g; Refill: 0  -     omeprazole (priLOSEC) 40 MG capsule; Take 1 capsule by mouth Daily With Lunch for 180 days.  Dispense: 90 capsule; Refill: 1  -     meloxicam (MOBIC) 15 MG tablet; Take 1 tablet by mouth Daily With Lunch for 360 days.  Dispense: 90 tablet; Refill: 3  -     Fluticasone-Umeclidin-Vilant (Trelegy Ellipta) 100-62.5-25 MCG/ACT inhaler; Inhale 1 puff Daily. SAMPLE GIVEN # 4 BOXES OF 14 EA  Dispense: 56 each; Refill: 0  -     Tirzepatide (Mounjaro) 2.5  MG/0.5ML solution pen-injector pen; Inject 0.5 mL under the skin into the appropriate area as directed 1 (One) Time Per Week for 4 doses.  Dispense: 0.5 mL; Refill: 3           I spent 30 minutes caring for Alejo on this date of service. This time includes time spent by me in the following activities:reviewing tests, obtaining and/or reviewing a separately obtained history, performing a medically appropriate examination and/or evaluation , counseling and educating the patient/family/caregiver, ordering medications, tests, or procedures, and documenting information in the medical record       Follow Up   Return in about 1 month (around 8/30/2024) for Recheck-- NEW MED FOR DM .  Patient was given instructions and counseling regarding his condition or for health maintenance advice. Please see specific information pulled into the AVS if appropriate.           This document has been electronically signed by Prince Hernández MD  July 30, 2024 10:37 EDT

## 2024-07-30 NOTE — PATIENT INSTRUCTIONS
High Triglycerides Eating Plan  Triglycerides are a type of fat in the blood. High levels of triglycerides can increase your risk of heart disease and stroke. If your triglyceride levels are high, choosing the right foods can help lower your triglycerides and keep your heart healthy. Work with your health care provider or a dietitian to develop an eating plan that is right for you.  What are tips for following this plan?  General guidelines    Lose weight, if you are overweight. For most people, losing 5-10 lb (2-5 kg) helps lower triglyceride levels. A weight-loss plan may include:  30 minutes of exercise at least 5 days a week.  Reducing the amount of calories, sugar, and fat you eat.  Eat a wide variety of fresh fruits, vegetables, and whole grains. These foods are high in fiber.  Eat foods that contain healthy fats, such as fatty fish, nuts, seeds, and olive oil.  Avoid foods that are high in added sugar, added salt (sodium), and saturated fat.  Avoid low-fiber, refined carbohydrates such as white bread, crackers, noodles, and white rice.  Avoid foods with trans fats or partially hydrogenated oils, such as fried foods or stick margarine.  If you drink alcohol:  Limit how much you have to:  0-1 drink a day for women who are not pregnant.  0-2 drinks a day for men.  Your health care provider may recommend that you drink less than these amounts depending on your overall health.  Know how much alcohol is in a drink. In the [place] one drink equals one 12 oz bottle of beer (355 mL), one 5 oz glass of wine (148 mL), or one 1½ oz glass of hard liquor (44 mL).  Reading food labels  Check food labels for:  The amount of saturated fat. Choose foods with no or very little saturated fat (less than 2 g).  The amount of trans fat. Choose foods with no transfat.  The amount of cholesterol. Choose foods that are low in cholesterol.  The amount of sodium. Choose foods with less than 140 milligrams (mg) per serving.  Shopping  Buy  dairy products labeled as nonfat (skim) or low-fat (1%).  Avoid buying processed or prepackaged foods. These are often high in added sugar, sodium, and fat.  Cooking  Choose healthy fats when cooking, such as olive oil, avocado oil, or canola oil.  Cook foods using lower fat methods, such as baking, broiling, boiling, or grilling.  Make your own sauces, dressings, and marinades when possible, instead of buying them. Store-bought sauces, dressings, and marinades are often high in sodium and sugar.  Meal planning  Eat more home-cooked food and less restaurant, buffet, and fast food.  Eat fatty fish at least 2 times each week. Examples of fatty fish include salmon, trout, sardines, mackerel, tuna, and herring.  If you eat whole eggs, do not eat more than 4 egg yolks per week.  What foods should I eat?  Fruits  All fresh, canned (in natural juice), or frozen fruits.  Vegetables  Fresh or frozen vegetables. Low-sodium canned vegetables.  Grains  Whole wheat or whole grain breads, crackers, cereals, and pasta. Unsweetened oatmeal. Bulgur. Barley. Quinoa. Brown rice. Whole wheat flour tortillas.  Meats and other proteins  Skinless chicken or turkey. Ground chicken or turkey. Lean cuts of pork, trimmed of fat. Fish and seafood, especially salmon, trout, and herring. Egg whites. Dried beans, peas, or lentils. Unsalted nuts or seeds. Unsalted canned beans. Natural peanut or almond butter or other nut butters.  Dairy  Low-fat dairy products. Skim or low-fat (1%) milk. Reduced fat (2%) and low-sodium cheese. Low-fat ricotta cheese. Low-fat cottage cheese. Plain, low-fat yogurt.  Fats and oils  Tub margarine without trans fats. Light or reduced-fat mayonnaise. Light or reduced-fat salad dressings. Avocado. Safflower, olive, sunflower, soybean, and canola oils.  The items listed above may not be a complete list of recommended foods and beverages. Talk with your dietitian about what dietary choices are best for you.  What foods  should I avoid?  Fruits  Sweetened dried fruit. Canned fruit in syrup. Fruit juice.  Vegetables  Creamed or fried vegetables. Vegetables in a cheese sauce.  Grains  White bread. White (regular) pasta. White rice. Cornbread. Bagels. Pastries. Crackers that contain trans fat.  Meats and other proteins  Fatty cuts of meat. Ribs. Chicken wings. López. Sausage. Bologna. Salami. Chitterlings. Fatback. Hot dogs. Bratwurst. Packaged lunch meats.  Dairy  Whole or reduced-fat (2%) milk. Half-and-half. Cream cheese. Full-fat or sweetened yogurt. Full-fat cheese. Nondairy creamers. Whipped toppings. Processed cheese or cheese spreads. Cheese curds.  Fats and oils  Butter. Stick margarine. Lard. Shortening. Ghee. López fat. Tropical oils, such as coconut, palm kernel, or palm oils.  Beverages  Alcohol. Sweetened drinks, such as soda, lemonade, fruit drinks, or punches.  Sweets and desserts  Corn syrup. Sugars. Honey. Molasses. Candy. Jam and jelly. Syrup. Sweetened cereals. Cookies. Pies. Cakes. Donuts. Muffins. Ice cream.  Condiments  Store-bought sauces, dressings, and marinades that are high in sugar, such as ketchup and barbecue sauce.  The items listed above may not be a complete list of foods and beverages you should avoid. Talk with your dietitian about what dietary choices are best for you.  Summary  High levels of triglycerides can increase the risk of heart disease and stroke. Choosing the right foods can help lower your triglycerides.  Eat plenty of fresh fruits, vegetables, and whole grains. Choose low-fat dairy and lean meats. Eat fatty fish at least twice a week.  Avoid processed and prepackaged foods with added sugar, sodium, saturated fat, and trans fat.  If you need suggestions or have questions about what types of food are good for you, talk with your health care provider or a dietitian.  This information is not intended to replace advice given to you by your health care provider. Make sure you discuss any  questions you have with your health care provider.  Document Revised: 04/29/2022 Document Reviewed: 04/29/2022  Elsevier Patient Education © 2024 [employer]  Obesity, Adult  Obesity is the condition of having too much total body fat. Being overweight or obese means that your weight is greater than what is considered healthy for your body size. Obesity is determined by a measurement called BMI (body mass index). BMI is an estimate of body fat and is calculated from height and weight. For adults, a BMI of 30 or higher is considered obese.  Obesity can lead to other health concerns and major illnesses, including:  Stroke.  Coronary artery disease (CAD).  Type 2 diabetes.  Some types of cancer, including cancers of the colon, breast, uterus, and gallbladder.  High blood pressure (hypertension).  High cholesterol.  Gallbladder stones.  Obesity can also contribute to:  Osteoarthritis.  Sleep apnea.  Infertility problems.  What are the causes?  Common causes of this condition include:  Eating daily meals that are high in calories, sugar, and fat.  Drinking high amounts of sugar-sweetened beverages, such as soft drinks.  Being born with genes that may make you more likely to become obese.  Having a medical condition that causes obesity, including:  Hypothyroidism.  Polycystic ovarian syndrome (PCOS).  Binge-eating disorder.  Cushing syndrome.  Taking certain medicines, such as steroids, antidepressants, and seizure medicines.  Not being physically active (sedentary lifestyle).  Not getting enough sleep.  What increases the risk?  The following factors may make you more likely to develop this condition:  Having a family history of obesity.  Living in an area with limited access to:  [place], recreation centers, or sidewalks.  Healthy food choices, such as grocery stores and [employer] markets.  What are the signs or symptoms?  The main sign of this condition is having too much body fat.  How is this diagnosed?  This condition  is diagnosed based on:  Your BMI. If you are an adult with a BMI of 30 or higher, you are considered obese.  Your waist circumference. This measures the distance around your waistline.  Your skinfold thickness. Your health care provider may gently pinch a fold of your skin and measure it.  You may have other tests to check for underlying conditions.  How is this treated?  Treatment for this condition often includes changing your lifestyle. Treatment may include some or all of the following:  Dietary changes. This may include developing a healthy meal plan.  Regular physical activity. This may include activity that causes your heart to beat faster (aerobic exercise) and strength training. Work with your health care provider to design an exercise program that works for you.  Medicine to help you lose weight if you are unable to lose one pound a week after six weeks of healthy eating and more physical activity.  Treating conditions that cause the obesity (underlying conditions).  Surgery. Surgical options may include gastric banding and gastric bypass. Surgery may be done if:  Other treatments have not helped to improve your condition.  You have a BMI of 40 or higher.  You have life-threatening health problems related to obesity.  Follow these instructions at home:  Eating and drinking    Follow recommendations from your health care provider about what you eat and drink. Your health care provider may advise you to:  Limit fast food, sweets, and processed snack foods.  Choose low-fat options, such as low-fat milk instead of whole milk.  Eat five or more servings of fruits or vegetables every day.  Choose healthy foods when you eat out.  Keep low-fat snacks available.  Limit sugary drinks, such as soda, fruit juice, sweetened iced tea, and flavored milk.  Drink enough water to keep your urine pale yellow.  Do not follow a fad diet. Fad diets can be unhealthy and even dangerous.  Other healthful choices include:  Eat at  home more often. This gives you more control over what you eat.  Learn to read food labels. This will help you understand how much food is considered one serving.  Learn what a healthy serving size is.  Physical activity  Exercise regularly, as told by your health care provider.  Most adults should get up to 150 minutes of moderate-intensity exercise every week.  Ask your health care provider what types of exercise are safe for you and how often you should exercise.  Warm up and stretch before being active.  Cool down and stretch after being active.  Rest between periods of activity.  Lifestyle  Work with your health care provider and a dietitian to set a weight-loss goal that is healthy and reasonable for you.  Limit your screen time.  Find ways to reward yourself that do not involve food.  Do not drink alcohol if:  Your health care provider tells you not to drink.  You are pregnant, may be pregnant, or are planning to become pregnant.  If you drink alcohol:  Limit how much you have to:  0-1 drink a day for women.  0-2 drinks a day for men.  Know how much alcohol is in your drink. In the [place] one drink equals one 12 oz bottle of beer (355 mL), one 5 oz glass of wine (148 mL), or one 1½ oz glass of hard liquor (44 mL).  General instructions  Keep a weight-loss journal to keep track of the food you eat and how much exercise you get.  Take over-the-counter and prescription medicines only as told by your health care provider.  Take vitamins and supplements only as told by your health care provider.  Consider joining a support group. Your health care provider may be able to recommend a support group.  Pay attention to your mental health as obesity can lead to depression or self esteem issues.  Keep all follow-up visits. This is important.  Contact a health care provider if:  You are unable to meet your weight-loss goal after six weeks of dietary and lifestyle changes.  You have trouble breathing.  Summary  Obesity is  the condition of having too much total body fat.  Being overweight or obese means that your weight is greater than what is considered healthy for your body size.  Work with your health care provider and a dietitian to set a weight-loss goal that is healthy and reasonable for you.  Exercise regularly, as told by your health care provider. Ask your health care provider what types of exercise are safe for you and how often you should exercise.  This information is not intended to replace advice given to you by your health care provider. Make sure you discuss any questions you have with your health care provider.  Document Revised: 07/26/2022 Document Reviewed: 07/26/2022  Elsevier Patient Education © 2024 [employer]

## 2024-07-30 NOTE — ASSESSMENT & PLAN NOTE
Diabetes is WORSENING   Medication changes per orders.  Recommended an ADA diet.  Discussed ways to avoid symptomatic hypoglycemia.  Discussed sick day management.  Discussed foot care.  Reminded to get yearly retinal exam.  Ophthalmology/Optometry referral.    DENIES PMH PANCREATITIS FH OF MEDULLARY THYROID CANCER        Diabetes will be reassessed in 1 month    WILL START MOUNJARO AND CONTINUE JARDIANCE

## 2024-07-30 NOTE — PROGRESS NOTES
Alejo Condon presents to [facility] for injection of 8 mg of decadron in the Right Ventrogluteal and 1,000 mcg in the left ventragulteal.. Patient currently takes   Current Outpatient Medications:     albuterol sulfate  (90 Base) MCG/ACT inhaler, Inhale 2 puffs Every 4 (Four) Hours As Needed for Wheezing or Shortness of Air., Disp: 6.7 g, Rfl: 0    atorvastatin (LIPITOR) 20 MG tablet, TAKE 1 TABLET BY MOUTH EVERY NIGHT. FOR 90 DAYS, Disp: 90 tablet, Rfl: 0    buprenorphine-naloxone (SUBOXONE) 8-2 MG film film, PLACE 2.75 FILM UNDER TONGUE ONCE A DAY, Disp: , Rfl:     carvedilol (COREG) 6.25 MG tablet, Take 1 tablet by mouth 2 (Two) Times a Day With Meals., Disp: 180 tablet, Rfl: 3    empagliflozin (Jardiance) 10 MG tablet tablet, Take 1 tablet by mouth Every Morning., Disp: 90 tablet, Rfl: 3    furosemide (LASIX) 40 MG tablet, Take 1 tablet by mouth 2 (Two) Times a Day., Disp: 180 tablet, Rfl: 3    hydrOXYzine (ATARAX) 50 MG tablet, Take 1 tablet by mouth every night at bedtime., Disp: 90 tablet, Rfl: 1    losartan (COZAAR) 50 MG tablet, Take 1 tablet by mouth Daily With Breakfast., Disp: 90 tablet, Rfl: 3    meloxicam (MOBIC) 15 MG tablet, Take 1 tablet by mouth Daily With Lunch for 360 days., Disp: 90 tablet, Rfl: 3    O2 (OXYGEN), Inhale 1 (One) Time., Disp: , Rfl:     omeprazole (priLOSEC) 40 MG capsule, Take 1 capsule by mouth Daily With Lunch for 180 days., Disp: 90 capsule, Rfl: 1    sertraline (ZOLOFT) 100 MG tablet, Take 1 tablet by mouth Daily With Dinner for 90 days., Disp: 90 tablet, Rfl: 0    spironolactone (ALDACTONE) 25 MG tablet, Take 1 tablet by mouth Daily., Disp: 90 tablet, Rfl: 3    traZODone (DESYREL) 50 MG tablet, Take 1 tablet by mouth every night at bedtime., Disp: 90 tablet, Rfl: 1    clotrimazole-betamethasone (LOTRISONE) 1-0.05 % cream, Apply 1 Application topically to the appropriate area as directed 2 (Two) Times a Day for 28 days., Disp: 45 g, Rfl: 0     Fluticasone-Umeclidin-Vilant (Trelegy Ellipta) 100-62.5-25 MCG/ACT inhaler, Inhale 1 puff Daily. SAMPLE GIVEN # 4 BOXES OF 14 EA, Disp: 56 each, Rfl: 0    Tirzepatide (Mounjaro) 2.5 MG/0.5ML solution pen-injector pen, Inject 0.5 mL under the skin into the appropriate area as directed 1 (One) Time Per Week for 4 doses., Disp: 0.5 mL, Rfl: 3    Current Facility-Administered Medications:     cyanocobalamin injection 1,000 mcg, 1,000 mcg, Intramuscular, Q28 Days, Prince Hernández MD, 1,000 mcg at 04/11/24 1606    cyanocobalamin injection 1,000 mcg, 1,000 mcg, Intramuscular, Q28 Days, Kareem Varghese MD, 1,000 mcg at 06/11/24 1041 and has vitals today of   Vitals:    07/30/24 0947   BP: 139/75   Pulse: 73   Resp: 18   Temp: 97 °F (36.1 °C)   SpO2: 95%      Vitals:    07/30/24 0947   Patient Position: Sitting        Patient was told to return to clinic by Prince Hernández MD and to receive injection, Any allergies and the injection to be given was reviewed with the patient. Patient verbalized understanding and are aware of the plan.     Patient tolerated injection well with no signs or symptoms of allergic reaction or intolerance, Patient was monitored for 15 minutes and released after the appropriate time.         Stacy Guerrero CMA

## 2024-08-01 ENCOUNTER — OFFICE VISIT (OUTPATIENT)
Dept: PULMONOLOGY | Facility: CLINIC | Age: 64
End: 2024-08-01
Payer: MEDICARE

## 2024-08-01 VITALS
HEART RATE: 56 BPM | DIASTOLIC BLOOD PRESSURE: 70 MMHG | TEMPERATURE: 97 F | RESPIRATION RATE: 16 BRPM | WEIGHT: 271 LBS | OXYGEN SATURATION: 96 % | SYSTOLIC BLOOD PRESSURE: 136 MMHG | HEIGHT: 69 IN | BODY MASS INDEX: 40.14 KG/M2

## 2024-08-01 DIAGNOSIS — Z87.891 FORMER SMOKER: ICD-10-CM

## 2024-08-01 DIAGNOSIS — Q27.30 AVM (CONGENITAL ARTERIOVENOUS MALFORMATION): Primary | ICD-10-CM

## 2024-08-01 DIAGNOSIS — J96.11 CHRONIC RESPIRATORY FAILURE WITH HYPOXIA: ICD-10-CM

## 2024-08-01 DIAGNOSIS — G47.33 OSA (OBSTRUCTIVE SLEEP APNEA): ICD-10-CM

## 2024-08-01 DIAGNOSIS — Z86.79 HISTORY OF CHF (CONGESTIVE HEART FAILURE): ICD-10-CM

## 2024-08-01 DIAGNOSIS — R94.2 RESTRICTIVE PATTERN PRESENT ON PULMONARY FUNCTION TESTING: ICD-10-CM

## 2024-08-01 PROBLEM — E66.01 OBESITY, CLASS III, BMI 40-49.9 (MORBID OBESITY): Status: ACTIVE | Noted: 2024-08-01

## 2024-08-01 NOTE — PROGRESS NOTES
PULMONARY  NOTE    Chief Complaint     Pulmonary AVM, former smoker, obesity class III, diabetes mellitus, history of congestive heart failure, hypoxic respiratory failure    History of Present Illness     64-year-old male referred for abnormal chest imaging    He has a history of tobacco abuse that finally resolved in October 2023    He has been told that he had COPD although spirometry today is normal    He has a history of acute systolic heart failure which on follow-up transthoracic echocardiogram revealed improvement in LV systolic function    He has been seen in the past by pulmonary physician who prescribed him supplemental oxygen  He has both a stationary and portable concentrator    [facility] revealed a vascular structure in the right lower lobe of about 1.2 cm in size    Patient Active Problem List   Diagnosis    Hypertension    Mixed hyperlipidemia    Acute systolic CHF (congestive heart failure)    Alcohol abuse    Tobacco abuse    Age-related nuclear cataract of right eye    Benign essential hypertension    CHF (congestive heart failure)    Colon polyp    Diverticulitis    Erectile dysfunction    Intermittent exotropia, monocular    Irritable bowel syndrome (IBS)    Lumbar degenerative disc disease    Posterior tibial tendinitis of right lower extremity    Pseudophakia, right eye    Epiretinal membrane, right eye    Serous choroidal detachment of right eye    H/O vitrectomy    Other specified abdominal hernia without obstruction or gangrene    Anxiety    Skin lesion of right leg    Former smoker (Stopped 10/2023)    Pre-diabetes    Type 2 diabetes mellitus with hyperglycemia    RLL AVM (congenital arteriovenous malformation)    Chronic respiratory failure with hypoxia    R/O FELIPA (obstructive sleep apnea)    Obesity, Class III, BMI 40-49.9 (morbid obesity)    History of CHF (congestive heart failure) - EF improved on follow-up    Restrictive pattern on PFTs  w/o evidence of ILD      No Known Allergies    Current Outpatient Medications:     albuterol sulfate  (90 Base) MCG/ACT inhaler, Inhale 2 puffs Every 4 (Four) Hours As Needed for Wheezing or Shortness of Air., Disp: 6.7 g, Rfl: 0    atorvastatin (LIPITOR) 20 MG tablet, TAKE 1 TABLET BY MOUTH EVERY NIGHT. FOR 90 DAYS, Disp: 90 tablet, Rfl: 0    buprenorphine-naloxone (SUBOXONE) 8-2 MG film film, PLACE 2.75 FILM UNDER TONGUE ONCE A DAY, Disp: , Rfl:     carvedilol (COREG) 6.25 MG tablet, Take 1 tablet by mouth 2 (Two) Times a Day With Meals., Disp: 180 tablet, Rfl: 3    clotrimazole-betamethasone (LOTRISONE) 1-0.05 % cream, Apply 1 Application topically to the appropriate area as directed 2 (Two) Times a Day for 28 days., Disp: 45 g, Rfl: 0    empagliflozin (Jardiance) 10 MG tablet tablet, Take 1 tablet by mouth Every Morning., Disp: 90 tablet, Rfl: 3    Fluticasone-Umeclidin-Vilant (Trelegy Ellipta) 100-62.5-25 MCG/ACT inhaler, Inhale 1 puff Daily. SAMPLE GIVEN # 4 BOXES OF 14 EA, Disp: 56 each, Rfl: 0    furosemide (LASIX) 40 MG tablet, Take 1 tablet by mouth 2 (Two) Times a Day., Disp: 180 tablet, Rfl: 3    hydrOXYzine (ATARAX) 50 MG tablet, Take 1 tablet by mouth every night at bedtime., Disp: 90 tablet, Rfl: 1    losartan (COZAAR) 50 MG tablet, Take 1 tablet by mouth Daily With Breakfast., Disp: 90 tablet, Rfl: 3    meloxicam (MOBIC) 15 MG tablet, Take 1 tablet by mouth Daily With Lunch for 360 days., Disp: 90 tablet, Rfl: 3    O2 (OXYGEN), Inhale 1 (One) Time., Disp: , Rfl:     omeprazole (priLOSEC) 40 MG capsule, Take 1 capsule by mouth Daily With Lunch for 180 days., Disp: 90 capsule, Rfl: 1    sertraline (ZOLOFT) 100 MG tablet, Take 1 tablet by mouth Daily With Dinner for 90 days., Disp: 90 tablet, Rfl: 0    spironolactone (ALDACTONE) 25 MG tablet, Take 1 tablet by mouth Daily., Disp: 90 tablet, Rfl: 3    Tirzepatide (Mounjaro) 2.5 MG/0.5ML solution pen-injector pen, Inject 0.5 mL under the skin into  "the appropriate area as directed 1 (One) Time Per Week for 4 doses., Disp: 0.5 mL, Rfl: 3    traZODone (DESYREL) 50 MG tablet, Take 1 tablet by mouth every night at bedtime., Disp: 90 tablet, Rfl: 1    Current Facility-Administered Medications:     cyanocobalamin injection 1,000 mcg, 1,000 mcg, Intramuscular, Q28 Days, Prince Hernández MD, 1,000 mcg at 24 1050    cyanocobalamin injection 1,000 mcg, 1,000 mcg, Intramuscular, Q28 Days, Kareem Varghese MD, 1,000 mcg at 24 1041  MEDICATION LIST AND ALLERGIES REVIEWED.    Family History   Problem Relation Age of Onset    Hypertension Mother     Hyperlipidemia Mother     Heart failure Mother     Heart attack Father 88    Cardiomyopathy Brother 36        Patient reports he had SCD that they attributed to agent orange     Social History     Tobacco Use    Smoking status: Former     Average packs/day: 0.3 packs/day for 20.0 years (5.0 ttl pk-yrs)     Types: Cigarettes     Start date: 2023     Quit date: 1967     Years since quittin.7     Passive exposure: Past    Smokeless tobacco: Never    Tobacco comments:     on and off since he was 6 years old   Vaping Use    Vaping status: Never Used   Substance Use Topics    Alcohol use: Not Currently     Comment: every other day    Drug use: Not Currently     Types: Marijuana     Comment: last smoked about 2023     Social History     Social History Narrative    caffeine use: 2 servings of coffee daily    Retired from maintenance, construction,     Stop smoking 2023    Previous heavy alcohol use, improved over the last 6 months     FAMILY AND SOCIAL HISTORY REVIEWED.    Review of Systems  IF PRESENT REFER TO SCANNED ROS SHEET FROM SAME DATE  OTHERWISE ROS OBTAINED AND NON-CONTRIBUTORY OVER HPI.    /70   Pulse 56   Temp 97 °F (36.1 °C)   Resp 16   Ht 175 cm (68.9\")   Wt 123 kg (271 lb)   SpO2 96% Comment: room air at resting  BMI 40.14 kg/m²   Physical Exam  Vitals " and nursing note reviewed.   Constitutional:       General: He is not in acute distress.     Appearance: He is well-developed. He is not diaphoretic.   HENT:      Head: Normocephalic and atraumatic.   Neck:      Thyroid: No thyromegaly.   Cardiovascular:      Rate and Rhythm: Normal rate and regular rhythm.      Heart sounds: Normal heart sounds. No murmur heard.  Pulmonary:      Effort: Pulmonary effort is normal.      Breath sounds: Normal breath sounds. No stridor.   Musculoskeletal:      Comments: Trace pitting lower extremity edema   Lymphadenopathy:      Cervical: No cervical adenopathy.      Upper Body:      Right upper body: No supraclavicular or epitrochlear adenopathy.      Left upper body: No supraclavicular or epitrochlear adenopathy.   Skin:     General: Skin is warm and dry.   Neurological:      Mental Status: He is alert and oriented to person, place, and time.   Psychiatric:         Behavior: Behavior normal.         Results     CT angi[facility] from [facility] appears to show a vascular structure in the right lower lobe.  No mass or adenopathy    PFTs reveal no airway obstruction, mild restriction, and a normal diffusion capacity    Immunization History   Administered Date(s) Administered    COVID-19 (PFIZER) Purple Cap Monovalent 12/24/2021    Covid-19 (Pfizer) Gray Cap Monovalent 01/20/2022    Influenza, Unspecified 01/15/2015    MMR 03/21/2012, 04/26/2012    PPD Test 08/18/2009, 09/01/2009, 12/07/2010, 12/15/2010, 03/19/2012, 02/13/2013, 02/26/2013, 02/26/2014    Pneumococcal Polysaccharide (PPSV23) 09/14/2018    Tdap 03/19/2012, 06/20/2017     Problem List       ICD-10-CM ICD-9-CM   1. RLL AVM (congenital arteriovenous malformation)  Q27.30 747.60   2. Chronic respiratory failure with hypoxia  J96.11 518.83     799.02   3. R/O FELIPA (obstructive sleep apnea)  G47.33 327.23   4. Former smoker (Stopped 10/2023)  Z87.891 V15.82   5. History of CHF (congestive heart failure) - EF improved on  follow-up  Z86.79 V12.59   6. Restrictive pattern on PFTs w/o evidence of ILD  R94.2 794.2       Discussion     We reviewed his test results I reassured him he does not have evidence of COPD by spirometry    Have encouraged his smoking cessation efforts    He has a restrictive defect on body plethysmography but no evidence of ILD  This is probably on the basis of his body habitus    I encourage continued use of supplemental oxygen every night and during the day as needed to keep SaO2 %    He appears to have a large right lower lobe AVM and this may be resulting in significant intrapulmonary shunting and contributing to his hypoxia  I will consult interventional radiology for consideration of angiography and embolization    Eventually, he should be scheduled for sleep study    I will see him back after the above    Moderate level of Medical Decision Making complexity based on 1 undiagnosed new problem or 2 stable chronic conditions, independent interpretation of tests, and/or prescription drug management     Uday Ruelas MD  Note electronically signed    CC: Prince Hernández MD

## 2024-08-12 ENCOUNTER — OFFICE VISIT (OUTPATIENT)
Dept: NEUROSURGERY | Facility: CLINIC | Age: 64
End: 2024-08-12
Payer: MEDICARE

## 2024-08-12 VITALS
OXYGEN SATURATION: 99 % | HEART RATE: 59 BPM | BODY MASS INDEX: 39.38 KG/M2 | HEIGHT: 69 IN | SYSTOLIC BLOOD PRESSURE: 120 MMHG | TEMPERATURE: 97.1 F | DIASTOLIC BLOOD PRESSURE: 64 MMHG | WEIGHT: 265.9 LBS

## 2024-08-12 DIAGNOSIS — I28.1 PULMONARY ARTERY ANEURYSM: Primary | ICD-10-CM

## 2024-08-12 PROCEDURE — 99204 OFFICE O/P NEW MOD 45 MIN: CPT | Performed by: RADIOLOGY

## 2024-08-12 PROCEDURE — 1159F MED LIST DOCD IN RCRD: CPT | Performed by: RADIOLOGY

## 2024-08-12 PROCEDURE — 3074F SYST BP LT 130 MM HG: CPT | Performed by: RADIOLOGY

## 2024-08-12 PROCEDURE — 3078F DIAST BP <80 MM HG: CPT | Performed by: RADIOLOGY

## 2024-08-12 PROCEDURE — 1160F RVW MEDS BY RX/DR IN RCRD: CPT | Performed by: RADIOLOGY

## 2024-08-12 RX ORDER — SODIUM CHLORIDE 9 MG/ML
40 INJECTION, SOLUTION INTRAVENOUS AS NEEDED
Status: CANCELLED | OUTPATIENT
Start: 2024-08-12

## 2024-08-12 RX ORDER — SODIUM CHLORIDE 9 MG/ML
100 INJECTION, SOLUTION INTRAVENOUS CONTINUOUS
Status: CANCELLED | OUTPATIENT
Start: 2024-08-12

## 2024-08-12 RX ORDER — SODIUM CHLORIDE 0.9 % (FLUSH) 0.9 %
1-10 SYRINGE (ML) INJECTION AS NEEDED
Status: CANCELLED | OUTPATIENT
Start: 2024-08-12

## 2024-08-12 RX ORDER — SODIUM CHLORIDE 0.9 % (FLUSH) 0.9 %
10 SYRINGE (ML) INJECTION EVERY 12 HOURS SCHEDULED
Status: CANCELLED | OUTPATIENT
Start: 2024-08-12

## 2024-08-12 NOTE — PROGRESS NOTES
NAME: GLENN CASTELAN   DOS: 2024  : 1960  PCP: Prince Hernández MD    Chief Complaint:    Chief Complaint   Patient presents with    Pulmonary AVM     Referral for Dr. Ruelas       History of Present Illness:  64 y.o. male who is a former smoker, with history of COPD and hypoxia.  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  He gives no history of significant hemoptysis.  I am seeing him in consultation regarding his right lower lobe vascular abnormality.    Past Medical History:  Past Medical History:   Diagnosis Date    Acute systolic CHF (congestive heart failure) 2020    Echo 20 LVEF = 31.0%. Left ventricular diastolic dysfunction is noted (grade III w/high LAP) consistent with reversible restrictive pattern Left atrial volume is moderately increased.    Diverticulitis     Elevated cholesterol     Hyperlipidemia     Hypertension     Opiate addiction     Resolved since on suboxone    R/O FELIPA (obstructive sleep apnea) 2024       Past Surgical History:  Past Surgical History:   Procedure Laterality Date    ANAL FISTULA REPAIR N/A     CARDIAC CATHETERIZATION N/A 2020    Procedure: Left Heart Cath;  Surgeon: Garrett Trejo MD;  Location:  [facility];  Service: Cardiovascular;  Laterality: N/A;    COLONOSCOPY      COLONOSCOPY N/A 2023    Procedure: COLONOSCOPY FOR SCREENING;  Surgeon: Crystal Rodriguez MD;  Location: [facility] OR;  Service: Gastroenterology;  Laterality: N/A;    ENDOSCOPY      ENDOSCOPY N/A 2023    Procedure: ESOPHAGOGASTRODUODENOSCOPY WITH BIOPSY;  Surgeon: Crystal Rodriguez MD;  Location: [facility] OR;  Service: Gastroenterology;  Laterality: N/A;    FINGER SURGERY      REFRACTIVE SURGERY      RETINAL DETACHMENT REPAIR  2021       Review of Systems:        Review of Systems   Constitutional:  Negative for  activity change, appetite change, chills, diaphoresis, fatigue, fever and unexpected weight change.   HENT:  Negative for congestion, dental problem, drooling, ear discharge, ear pain, facial swelling, hearing loss, mouth sores, nosebleeds, postnasal drip, rhinorrhea, sinus pressure, sinus pain, sneezing, sore throat, tinnitus, trouble swallowing and voice change.    Eyes:  Negative for photophobia, pain, discharge, redness, itching and visual disturbance.   Respiratory:  Negative for apnea, cough, choking, chest tightness, shortness of breath, wheezing and stridor.    Cardiovascular:  Negative for chest pain, palpitations and leg swelling.   Gastrointestinal:  Negative for abdominal distention, abdominal pain, anal bleeding, blood in stool, constipation, diarrhea, nausea, rectal pain and vomiting.   Endocrine: Negative for cold intolerance, heat intolerance, polydipsia, polyphagia and polyuria.   Genitourinary:  Negative for decreased urine volume, difficulty urinating, dysuria, enuresis, flank pain, frequency, genital sores, hematuria and urgency.   Musculoskeletal:  Positive for arthralgias and myalgias. Negative for back pain, gait problem, joint swelling, neck pain and neck stiffness.   Skin:  Negative for color change, pallor, rash and wound.   Allergic/Immunologic: Negative for environmental allergies, food allergies and immunocompromised state.   Neurological:  Negative for dizziness, tremors, seizures, syncope, facial asymmetry, speech difficulty, weakness, light-headedness, numbness and headaches.   Hematological:  Negative for adenopathy. Does not bruise/bleed easily.   Psychiatric/Behavioral:  Negative for agitation, behavioral problems, confusion, decreased concentration, dysphoric mood, hallucinations, self-injury, sleep disturbance and suicidal ideas. The patient is not nervous/anxious and is not hyperactive.       Medications    Current Outpatient Medications:     albuterol sulfate  (90 Base)  MCG/ACT inhaler, Inhale 2 puffs Every 4 (Four) Hours As Needed for Wheezing or Shortness of Air., Disp: 6.7 g, Rfl: 0    atorvastatin (LIPITOR) 20 MG tablet, TAKE 1 TABLET BY MOUTH EVERY NIGHT. FOR 90 DAYS, Disp: 90 tablet, Rfl: 0    buprenorphine-naloxone (SUBOXONE) 8-2 MG film film, PLACE 2.75 FILM UNDER TONGUE ONCE A DAY, Disp: , Rfl:     carvedilol (COREG) 6.25 MG tablet, Take 1 tablet by mouth 2 (Two) Times a Day With Meals., Disp: 180 tablet, Rfl: 3    clotrimazole-betamethasone (LOTRISONE) 1-0.05 % cream, Apply 1 Application topically to the appropriate area as directed 2 (Two) Times a Day for 28 days. (Patient taking differently: Apply 1 Application topically to the appropriate area as directed 2 (Two) Times a Day. Not using yet), Disp: 45 g, Rfl: 0    empagliflozin (Jardiance) 10 MG tablet tablet, Take 1 tablet by mouth Every Morning., Disp: 90 tablet, Rfl: 3    Fluticasone-Umeclidin-Vilant (Trelegy Ellipta) 100-62.5-25 MCG/ACT inhaler, Inhale 1 puff Daily. SAMPLE GIVEN # 4 BOXES OF 14 EA, Disp: 56 each, Rfl: 0    furosemide (LASIX) 40 MG tablet, Take 1 tablet by mouth 2 (Two) Times a Day., Disp: 180 tablet, Rfl: 3    hydrOXYzine (ATARAX) 50 MG tablet, Take 1 tablet by mouth every night at bedtime., Disp: 90 tablet, Rfl: 1    losartan (COZAAR) 50 MG tablet, Take 1 tablet by mouth Daily With Breakfast., Disp: 90 tablet, Rfl: 3    meloxicam (MOBIC) 15 MG tablet, Take 1 tablet by mouth Daily With Lunch for 360 days., Disp: 90 tablet, Rfl: 3    O2 (OXYGEN), Inhale 1 (One) Time., Disp: , Rfl:     omeprazole (priLOSEC) 40 MG capsule, Take 1 capsule by mouth Daily With Lunch for 180 days., Disp: 90 capsule, Rfl: 1    sertraline (ZOLOFT) 100 MG tablet, Take 1 tablet by mouth Daily With Dinner for 90 days., Disp: 90 tablet, Rfl: 0    spironolactone (ALDACTONE) 25 MG tablet, Take 1 tablet by mouth Daily., Disp: 90 tablet, Rfl: 3    traZODone (DESYREL) 50 MG tablet, Take 1 tablet by mouth every night at bedtime.,  "Disp: 90 tablet, Rfl: 1    Tirzepatide (Mounjaro) 2.5 MG/0.5ML solution pen-injector pen, Inject 0.5 mL under the skin into the appropriate area as directed 1 (One) Time Per Week for 4 doses. (Patient not taking: Reported on 2024), Disp: 0.5 mL, Rfl: 3    Current Facility-Administered Medications:     cyanocobalamin injection 1,000 mcg, 1,000 mcg, Intramuscular, Q28 Days, Prince Hernández MD, 1,000 mcg at 24 1050    cyanocobalamin injection 1,000 mcg, 1,000 mcg, Intramuscular, Q28 Days, Kareem Varghese MD, 1,000 mcg at 24 1041    Allergies:  No Known Allergies    Social Hx:  Social History     Tobacco Use    Smoking status: Former     Average packs/day: 0.3 packs/day for 20.0 years (5.0 ttl pk-yrs)     Types: Cigarettes     Start date: 2023     Quit date: 1967     Years since quittin.7     Passive exposure: Past    Smokeless tobacco: Never    Tobacco comments:     on and off since he was 6 years old   Vaping Use    Vaping status: Never Used   Substance Use Topics    Alcohol use: Not Currently    Drug use: Not Currently     Types: Marijuana     Comment: last smoked about 2023       Family Hx:  Family History   Problem Relation Age of Onset    Hypertension Mother     Hyperlipidemia Mother     Heart failure Mother     Heart attack Father 88    Cardiomyopathy Brother 36        Patient reports he had SCD that they attributed to agent orange       Review of Imaging:  Outside CT angiogram of the chest dated 3/7/2024 from [facility] radiology in [place] was reviewed along with its corresponding radiologic report.  This study demonstrates dilatation of a peripheral right lower lobe pulmonary artery to nearly 13 mm in maximum dimension.  It is very challenging to discern whether this represents a pulmonary artery \"aneurysm\", or an AVM with associated venous aneurysmal dilatation.    Physical Examination:  Vitals:    24 1424   BP: 120/64   Pulse: 59   Temp: 97.1 °F (36.2 °C) "   SpO2: 99%        General Appearance:   Well developed, well nourished, well groomed, alert, and cooperative.    Neurological examination:  Alert and oriented x 3.  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  No facial droop.  Speech is clear.  Symmetric strength in the upper and lower extremities.  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  He ambulates unassisted.    Diagnoses/Plan:    Mr. Condon is a 64 y.o. 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  He gives no history of hemoptysis.  I discussed this finding in detail with Mr. Condon, and have recommended a diagnostic pulmonary angiogram to definitively evaluate the aforementioned vascular abnormality.  Mr. Condon very much wishes to pursue treatment at the same setting, if it is amenable to embolization, and this certainly seems reasonable.  The risk/benefits of the procedure, to include stroke, aneurysm rupture, need for additional surgery/procedure, etc., were discussed in detail, and Mr. Condon similarly expressed an understanding and wishes to proceed accordingly.  We will tentatively schedule him for a pulmonary angiogram with possible embolization (via right common femoral venous access) in the next couple of weeks or so.

## 2024-08-12 NOTE — H&P (VIEW-ONLY)
NAME: GLENN CASTELAN   DOS: 2024  : 1960  PCP: Prince Hernández MD    Chief Complaint:    Chief Complaint   Patient presents with    Pulmonary AVM     Referral for Dr. Ruelas       History of Present Illness:  64 y.o. male who is a former smoker, with history of COPD and hypoxia.  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  He gives no history of significant hemoptysis.  I am seeing him in consultation regarding his right lower lobe vascular abnormality.    Past Medical History:  Past Medical History:   Diagnosis Date    Acute systolic CHF (congestive heart failure) 2020    Echo 20 LVEF = 31.0%. Left ventricular diastolic dysfunction is noted (grade III w/high LAP) consistent with reversible restrictive pattern Left atrial volume is moderately increased.    Diverticulitis     Elevated cholesterol     Hyperlipidemia     Hypertension     Opiate addiction     Resolved since on suboxone    R/O FELIPA (obstructive sleep apnea) 2024       Past Surgical History:  Past Surgical History:   Procedure Laterality Date    ANAL FISTULA REPAIR N/A     CARDIAC CATHETERIZATION N/A 2020    Procedure: Left Heart Cath;  Surgeon: Garrett Trejo MD;  Location:  [facility];  Service: Cardiovascular;  Laterality: N/A;    COLONOSCOPY      COLONOSCOPY N/A 2023    Procedure: COLONOSCOPY FOR SCREENING;  Surgeon: Crystal Rodriguez MD;  Location: [facility] OR;  Service: Gastroenterology;  Laterality: N/A;    ENDOSCOPY      ENDOSCOPY N/A 2023    Procedure: ESOPHAGOGASTRODUODENOSCOPY WITH BIOPSY;  Surgeon: Crystal Rodriguez MD;  Location: [facility] OR;  Service: Gastroenterology;  Laterality: N/A;    FINGER SURGERY      REFRACTIVE SURGERY      RETINAL DETACHMENT REPAIR  2021       Review of Systems:        Review of Systems   Constitutional:  Negative for  activity change, appetite change, chills, diaphoresis, fatigue, fever and unexpected weight change.   HENT:  Negative for congestion, dental problem, drooling, ear discharge, ear pain, facial swelling, hearing loss, mouth sores, nosebleeds, postnasal drip, rhinorrhea, sinus pressure, sinus pain, sneezing, sore throat, tinnitus, trouble swallowing and voice change.    Eyes:  Negative for photophobia, pain, discharge, redness, itching and visual disturbance.   Respiratory:  Negative for apnea, cough, choking, chest tightness, shortness of breath, wheezing and stridor.    Cardiovascular:  Negative for chest pain, palpitations and leg swelling.   Gastrointestinal:  Negative for abdominal distention, abdominal pain, anal bleeding, blood in stool, constipation, diarrhea, nausea, rectal pain and vomiting.   Endocrine: Negative for cold intolerance, heat intolerance, polydipsia, polyphagia and polyuria.   Genitourinary:  Negative for decreased urine volume, difficulty urinating, dysuria, enuresis, flank pain, frequency, genital sores, hematuria and urgency.   Musculoskeletal:  Positive for arthralgias and myalgias. Negative for back pain, gait problem, joint swelling, neck pain and neck stiffness.   Skin:  Negative for color change, pallor, rash and wound.   Allergic/Immunologic: Negative for environmental allergies, food allergies and immunocompromised state.   Neurological:  Negative for dizziness, tremors, seizures, syncope, facial asymmetry, speech difficulty, weakness, light-headedness, numbness and headaches.   Hematological:  Negative for adenopathy. Does not bruise/bleed easily.   Psychiatric/Behavioral:  Negative for agitation, behavioral problems, confusion, decreased concentration, dysphoric mood, hallucinations, self-injury, sleep disturbance and suicidal ideas. The patient is not nervous/anxious and is not hyperactive.       Medications    Current Outpatient Medications:     albuterol sulfate  (90 Base)  MCG/ACT inhaler, Inhale 2 puffs Every 4 (Four) Hours As Needed for Wheezing or Shortness of Air., Disp: 6.7 g, Rfl: 0    atorvastatin (LIPITOR) 20 MG tablet, TAKE 1 TABLET BY MOUTH EVERY NIGHT. FOR 90 DAYS, Disp: 90 tablet, Rfl: 0    buprenorphine-naloxone (SUBOXONE) 8-2 MG film film, PLACE 2.75 FILM UNDER TONGUE ONCE A DAY, Disp: , Rfl:     carvedilol (COREG) 6.25 MG tablet, Take 1 tablet by mouth 2 (Two) Times a Day With Meals., Disp: 180 tablet, Rfl: 3    clotrimazole-betamethasone (LOTRISONE) 1-0.05 % cream, Apply 1 Application topically to the appropriate area as directed 2 (Two) Times a Day for 28 days. (Patient taking differently: Apply 1 Application topically to the appropriate area as directed 2 (Two) Times a Day. Not using yet), Disp: 45 g, Rfl: 0    empagliflozin (Jardiance) 10 MG tablet tablet, Take 1 tablet by mouth Every Morning., Disp: 90 tablet, Rfl: 3    Fluticasone-Umeclidin-Vilant (Trelegy Ellipta) 100-62.5-25 MCG/ACT inhaler, Inhale 1 puff Daily. SAMPLE GIVEN # 4 BOXES OF 14 EA, Disp: 56 each, Rfl: 0    furosemide (LASIX) 40 MG tablet, Take 1 tablet by mouth 2 (Two) Times a Day., Disp: 180 tablet, Rfl: 3    hydrOXYzine (ATARAX) 50 MG tablet, Take 1 tablet by mouth every night at bedtime., Disp: 90 tablet, Rfl: 1    losartan (COZAAR) 50 MG tablet, Take 1 tablet by mouth Daily With Breakfast., Disp: 90 tablet, Rfl: 3    meloxicam (MOBIC) 15 MG tablet, Take 1 tablet by mouth Daily With Lunch for 360 days., Disp: 90 tablet, Rfl: 3    O2 (OXYGEN), Inhale 1 (One) Time., Disp: , Rfl:     omeprazole (priLOSEC) 40 MG capsule, Take 1 capsule by mouth Daily With Lunch for 180 days., Disp: 90 capsule, Rfl: 1    sertraline (ZOLOFT) 100 MG tablet, Take 1 tablet by mouth Daily With Dinner for 90 days., Disp: 90 tablet, Rfl: 0    spironolactone (ALDACTONE) 25 MG tablet, Take 1 tablet by mouth Daily., Disp: 90 tablet, Rfl: 3    traZODone (DESYREL) 50 MG tablet, Take 1 tablet by mouth every night at bedtime.,  "Disp: 90 tablet, Rfl: 1    Tirzepatide (Mounjaro) 2.5 MG/0.5ML solution pen-injector pen, Inject 0.5 mL under the skin into the appropriate area as directed 1 (One) Time Per Week for 4 doses. (Patient not taking: Reported on 2024), Disp: 0.5 mL, Rfl: 3    Current Facility-Administered Medications:     cyanocobalamin injection 1,000 mcg, 1,000 mcg, Intramuscular, Q28 Days, Prince Hernández MD, 1,000 mcg at 24 1050    cyanocobalamin injection 1,000 mcg, 1,000 mcg, Intramuscular, Q28 Days, Kareem Varghese MD, 1,000 mcg at 24 1041    Allergies:  No Known Allergies    Social Hx:  Social History     Tobacco Use    Smoking status: Former     Average packs/day: 0.3 packs/day for 20.0 years (5.0 ttl pk-yrs)     Types: Cigarettes     Start date: 2023     Quit date: 1967     Years since quittin.7     Passive exposure: Past    Smokeless tobacco: Never    Tobacco comments:     on and off since he was 6 years old   Vaping Use    Vaping status: Never Used   Substance Use Topics    Alcohol use: Not Currently    Drug use: Not Currently     Types: Marijuana     Comment: last smoked about 2023       Family Hx:  Family History   Problem Relation Age of Onset    Hypertension Mother     Hyperlipidemia Mother     Heart failure Mother     Heart attack Father 88    Cardiomyopathy Brother 36        Patient reports he had SCD that they attributed to agent orange       Review of Imaging:  Outside CT angiogram of the chest dated 3/7/2024 from [facility] radiology in [place] was reviewed along with its corresponding radiologic report.  This study demonstrates dilatation of a peripheral right lower lobe pulmonary artery to nearly 13 mm in maximum dimension.  It is very challenging to discern whether this represents a pulmonary artery \"aneurysm\", or an AVM with associated venous aneurysmal dilatation.    Physical Examination:  Vitals:    24 1424   BP: 120/64   Pulse: 59   Temp: 97.1 °F (36.2 °C) "   SpO2: 99%        General Appearance:   Well developed, well nourished, well groomed, alert, and cooperative.    Neurological examination:  Alert and oriented x 3.  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  No facial droop.  Speech is clear.  Symmetric strength in the upper and lower extremities.  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  He ambulates unassisted.    Diagnoses/Plan:    Mr. Condon is a 64 y.o. 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  He gives no history of hemoptysis.  I discussed this finding in detail with Mr. Condon, and have recommended a diagnostic pulmonary angiogram to definitively evaluate the aforementioned vascular abnormality.  Mr. Condon very much wishes to pursue treatment at the same setting, if it is amenable to embolization, and this certainly seems reasonable.  The risk/benefits of the procedure, to include stroke, aneurysm rupture, need for additional surgery/procedure, etc., were discussed in detail, and Mr. Condon similarly expressed an understanding and wishes to proceed accordingly.  We will tentatively schedule him for a pulmonary angiogram with possible embolization (via right common femoral venous access) in the next couple of weeks or so.

## 2024-08-15 ENCOUNTER — HOSPITAL ENCOUNTER (OUTPATIENT)
Facility: HOSPITAL | Age: 64
Setting detail: HOSPITAL OUTPATIENT SURGERY
Discharge: HOME | End: 2024-08-15
Attending: RADIOLOGY | Admitting: RADIOLOGY
Payer: MEDICARE

## 2024-08-15 VITALS
WEIGHT: 266 LBS | HEIGHT: 69 IN | BODY MASS INDEX: 39.4 KG/M2 | TEMPERATURE: 96.8 F | HEART RATE: 70 BPM | SYSTOLIC BLOOD PRESSURE: 115 MMHG | DIASTOLIC BLOOD PRESSURE: 76 MMHG | OXYGEN SATURATION: 95 % | RESPIRATION RATE: 19 BRPM

## 2024-08-15 DIAGNOSIS — I28.1 PULMONARY ARTERY ANEURYSM: ICD-10-CM

## 2024-08-15 LAB
ANION GAP SERPL CALCULATED.3IONS-SCNC: 9 MMOL/L (ref 5–15)
BUN SERPL-MCNC: 14 MG/DL (ref 8–23)
BUN/CREAT SERPL: 17.9 (ref 7–25)
CALCIUM SPEC-SCNC: 8.9 MG/DL (ref 8.6–10.5)
CHLORIDE SERPL-SCNC: 106 MMOL/L (ref 98–107)
CO2 SERPL-SCNC: 26 MMOL/L (ref 22–29)
CREAT SERPL-MCNC: 0.78 MG/DL (ref 0.76–1.27)
DEPRECATED RDW RBC AUTO: 45.9 FL (ref 37–54)
EGFRCR SERPLBLD CKD-EPI 2021: 99 ML/MIN/1.73
ERYTHROCYTE [DISTWIDTH] IN BLOOD BY AUTOMATED COUNT: 14.3 % (ref 12.3–15.4)
GLUCOSE SERPL-MCNC: 94 MG/DL (ref 65–99)
HCT VFR BLD AUTO: 41.9 % (ref 37.5–51)
HGB BLD-MCNC: 13.4 G/DL (ref 13–17.7)
MCH RBC QN AUTO: 27.9 PG (ref 26.6–33)
MCHC RBC AUTO-ENTMCNC: 32 G/DL (ref 31.5–35.7)
MCV RBC AUTO: 87.1 FL (ref 79–97)
PLATELET # BLD AUTO: 188 10*3/MM3 (ref 140–450)
PMV BLD AUTO: 10.6 FL (ref 6–12)
POTASSIUM SERPL-SCNC: 4.1 MMOL/L (ref 3.5–5.2)
RBC # BLD AUTO: 4.81 10*6/MM3 (ref 4.14–5.8)
SODIUM SERPL-SCNC: 141 MMOL/L (ref 136–145)
WBC NRBC COR # BLD AUTO: 5.82 10*3/MM3 (ref 3.4–10.8)

## 2024-08-15 PROCEDURE — C1894 INTRO/SHEATH, NON-LASER: HCPCS | Performed by: RADIOLOGY

## 2024-08-15 PROCEDURE — 75741 ARTERY X-RAYS LUNG: CPT | Performed by: RADIOLOGY

## 2024-08-15 PROCEDURE — C1887 CATHETER, GUIDING: HCPCS | Performed by: RADIOLOGY

## 2024-08-15 PROCEDURE — 36015 PLACE CATHETER IN ARTERY: CPT | Performed by: RADIOLOGY

## 2024-08-15 PROCEDURE — 36415 COLL VENOUS BLD VENIPUNCTURE: CPT

## 2024-08-15 PROCEDURE — 0 IODIXANOL PER 1 ML: Performed by: RADIOLOGY

## 2024-08-15 PROCEDURE — 25810000003 SODIUM CHLORIDE 0.9 % SOLUTION: Performed by: RADIOLOGY

## 2024-08-15 PROCEDURE — C1769 GUIDE WIRE: HCPCS | Performed by: RADIOLOGY

## 2024-08-15 PROCEDURE — 99153 MOD SED SAME PHYS/QHP EA: CPT | Performed by: RADIOLOGY

## 2024-08-15 PROCEDURE — 80048 BASIC METABOLIC PNL TOTAL CA: CPT | Performed by: RADIOLOGY

## 2024-08-15 PROCEDURE — 25010000002 FENTANYL CITRATE (PF) 50 MCG/ML SOLUTION: Performed by: RADIOLOGY

## 2024-08-15 PROCEDURE — 99152 MOD SED SAME PHYS/QHP 5/>YRS: CPT | Performed by: RADIOLOGY

## 2024-08-15 PROCEDURE — 25010000002 MIDAZOLAM PER 1 MG: Performed by: RADIOLOGY

## 2024-08-15 PROCEDURE — 85027 COMPLETE CBC AUTOMATED: CPT | Performed by: RADIOLOGY

## 2024-08-15 RX ORDER — SODIUM CHLORIDE 9 MG/ML
40 INJECTION, SOLUTION INTRAVENOUS AS NEEDED
Status: DISCONTINUED | OUTPATIENT
Start: 2024-08-15 | End: 2024-08-15 | Stop reason: HOSPADM

## 2024-08-15 RX ORDER — IODIXANOL 320 MG/ML
INJECTION, SOLUTION INTRAVASCULAR
Status: DISCONTINUED | OUTPATIENT
Start: 2024-08-15 | End: 2024-08-15 | Stop reason: HOSPADM

## 2024-08-15 RX ORDER — SODIUM CHLORIDE 9 MG/ML
75 INJECTION, SOLUTION INTRAVENOUS CONTINUOUS
Status: ACTIVE | OUTPATIENT
Start: 2024-08-15 | End: 2024-08-15

## 2024-08-15 RX ORDER — SODIUM CHLORIDE 0.9 % (FLUSH) 0.9 %
10 SYRINGE (ML) INJECTION EVERY 12 HOURS SCHEDULED
Status: DISCONTINUED | OUTPATIENT
Start: 2024-08-15 | End: 2024-08-15 | Stop reason: HOSPADM

## 2024-08-15 RX ORDER — FENTANYL CITRATE 50 UG/ML
INJECTION, SOLUTION INTRAMUSCULAR; INTRAVENOUS
Status: DISCONTINUED | OUTPATIENT
Start: 2024-08-15 | End: 2024-08-15 | Stop reason: HOSPADM

## 2024-08-15 RX ORDER — SODIUM CHLORIDE 9 MG/ML
100 INJECTION, SOLUTION INTRAVENOUS CONTINUOUS
Status: DISCONTINUED | OUTPATIENT
Start: 2024-08-15 | End: 2024-08-15 | Stop reason: HOSPADM

## 2024-08-15 RX ORDER — NITROGLYCERIN 0.4 MG/1
0.4 TABLET SUBLINGUAL
Status: DISCONTINUED | OUTPATIENT
Start: 2024-08-15 | End: 2024-08-15 | Stop reason: HOSPADM

## 2024-08-15 RX ORDER — LIDOCAINE HYDROCHLORIDE 10 MG/ML
INJECTION, SOLUTION EPIDURAL; INFILTRATION; INTRACAUDAL; PERINEURAL
Status: DISCONTINUED | OUTPATIENT
Start: 2024-08-15 | End: 2024-08-15 | Stop reason: HOSPADM

## 2024-08-15 RX ORDER — SODIUM CHLORIDE 0.9 % (FLUSH) 0.9 %
1-10 SYRINGE (ML) INJECTION AS NEEDED
Status: DISCONTINUED | OUTPATIENT
Start: 2024-08-15 | End: 2024-08-15 | Stop reason: HOSPADM

## 2024-08-15 RX ORDER — MIDAZOLAM HYDROCHLORIDE 1 MG/ML
INJECTION INTRAMUSCULAR; INTRAVENOUS
Status: DISCONTINUED | OUTPATIENT
Start: 2024-08-15 | End: 2024-08-15 | Stop reason: HOSPADM

## 2024-08-15 RX ADMIN — SODIUM CHLORIDE 100 ML/HR: 9 INJECTION, SOLUTION INTRAVENOUS at 07:40

## 2024-08-15 NOTE — Clinical Note
Pulmonary angiogram with possible embolization procedure began, see physician dictation for full procedural report

## 2024-08-29 ENCOUNTER — OFFICE VISIT (OUTPATIENT)
Dept: FAMILY MEDICINE CLINIC | Facility: CLINIC | Age: 64
End: 2024-08-29
Payer: MEDICARE

## 2024-08-29 VITALS
RESPIRATION RATE: 18 BRPM | BODY MASS INDEX: 39.46 KG/M2 | SYSTOLIC BLOOD PRESSURE: 115 MMHG | DIASTOLIC BLOOD PRESSURE: 70 MMHG | HEART RATE: 78 BPM | OXYGEN SATURATION: 98 % | HEIGHT: 69 IN | WEIGHT: 266.4 LBS | TEMPERATURE: 97 F

## 2024-08-29 DIAGNOSIS — F41.9 ANXIETY: ICD-10-CM

## 2024-08-29 DIAGNOSIS — J44.1 COPD WITH EXACERBATION: ICD-10-CM

## 2024-08-29 DIAGNOSIS — I10 BENIGN ESSENTIAL HYPERTENSION: ICD-10-CM

## 2024-08-29 DIAGNOSIS — E78.2 MIXED HYPERLIPIDEMIA: Primary | ICD-10-CM

## 2024-08-29 DIAGNOSIS — E11.65 TYPE 2 DIABETES MELLITUS WITH HYPERGLYCEMIA, WITHOUT LONG-TERM CURRENT USE OF INSULIN: ICD-10-CM

## 2024-08-29 DIAGNOSIS — I10 ESSENTIAL HYPERTENSION: ICD-10-CM

## 2024-08-29 DIAGNOSIS — M81.0 AGE-RELATED OSTEOPOROSIS WITHOUT CURRENT PATHOLOGICAL FRACTURE: ICD-10-CM

## 2024-08-29 DIAGNOSIS — I50.22 CHRONIC SYSTOLIC CONGESTIVE HEART FAILURE: ICD-10-CM

## 2024-08-29 DIAGNOSIS — Z76.0 ENCOUNTER FOR MEDICATION REFILL: ICD-10-CM

## 2024-08-29 PROBLEM — Z86.79 HISTORY OF CHF (CONGESTIVE HEART FAILURE): Status: RESOLVED | Noted: 2024-08-01 | Resolved: 2024-08-29

## 2024-08-29 PROBLEM — L98.9 SKIN LESION OF RIGHT LEG: Status: RESOLVED | Noted: 2024-06-11 | Resolved: 2024-08-29

## 2024-08-29 PROBLEM — Z72.0 TOBACCO ABUSE: Status: RESOLVED | Noted: 2020-05-29 | Resolved: 2024-08-29

## 2024-08-29 PROBLEM — R73.03 PRE-DIABETES: Status: RESOLVED | Noted: 2024-06-11 | Resolved: 2024-08-29

## 2024-08-29 PROBLEM — I50.9 CHF (CONGESTIVE HEART FAILURE): Status: RESOLVED | Noted: 2021-06-28 | Resolved: 2024-08-29

## 2024-08-29 PROBLEM — H50.30 INTERMITTENT EXOTROPIA, MONOCULAR: Status: RESOLVED | Noted: 2023-01-12 | Resolved: 2024-08-29

## 2024-08-29 RX ORDER — ATORVASTATIN CALCIUM 20 MG/1
20 TABLET, FILM COATED ORAL NIGHTLY
Qty: 90 TABLET | Refills: 3 | Status: SHIPPED | OUTPATIENT
Start: 2024-08-29

## 2024-08-29 RX ORDER — MELOXICAM 15 MG/1
15 TABLET ORAL
Qty: 90 TABLET | Refills: 3 | Status: SHIPPED | OUTPATIENT
Start: 2024-08-29 | End: 2025-08-24

## 2024-08-29 RX ORDER — FLUTICASONE FUROATE, UMECLIDINIUM BROMIDE AND VILANTEROL TRIFENATATE 200; 62.5; 25 UG/1; UG/1; UG/1
1 POWDER RESPIRATORY (INHALATION)
Qty: 28 EACH | Refills: 0 | COMMUNITY
Start: 2024-08-29 | End: 2024-09-26

## 2024-08-29 RX ORDER — LOSARTAN POTASSIUM 50 MG/1
50 TABLET ORAL
Qty: 90 TABLET | Refills: 3 | Status: SHIPPED | OUTPATIENT
Start: 2024-08-29 | End: 2025-08-24

## 2024-08-29 RX ORDER — TRAZODONE HYDROCHLORIDE 50 MG/1
50 TABLET, FILM COATED ORAL
Qty: 90 TABLET | Refills: 3 | Status: SHIPPED | OUTPATIENT
Start: 2024-08-29

## 2024-08-29 RX ORDER — HYDROXYZINE HYDROCHLORIDE 50 MG/1
50 TABLET, FILM COATED ORAL
Qty: 90 TABLET | Refills: 3 | Status: SHIPPED | OUTPATIENT
Start: 2024-08-29

## 2024-08-29 RX ORDER — OMEPRAZOLE 40 MG/1
40 CAPSULE, DELAYED RELEASE ORAL
Qty: 90 CAPSULE | Refills: 3 | Status: SHIPPED | OUTPATIENT
Start: 2024-08-29 | End: 2025-08-24

## 2024-08-29 RX ORDER — SERTRALINE HYDROCHLORIDE 100 MG/1
100 TABLET, FILM COATED ORAL
Qty: 90 TABLET | Refills: 3 | Status: SHIPPED | OUTPATIENT
Start: 2024-08-29 | End: 2025-08-24

## 2024-08-29 RX ORDER — DEXAMETHASONE SODIUM PHOSPHATE 4 MG/ML
8 INJECTION, SOLUTION INTRA-ARTICULAR; INTRALESIONAL; INTRAMUSCULAR; INTRAVENOUS; SOFT TISSUE ONCE
Status: COMPLETED | OUTPATIENT
Start: 2024-08-29 | End: 2024-08-29

## 2024-08-29 RX ADMIN — CYANOCOBALAMIN 1000 MCG: 1000 INJECTION, SOLUTION INTRAMUSCULAR; SUBCUTANEOUS at 12:06

## 2024-08-29 RX ADMIN — DEXAMETHASONE SODIUM PHOSPHATE 8 MG: 4 INJECTION, SOLUTION INTRA-ARTICULAR; INTRALESIONAL; INTRAMUSCULAR; INTRAVENOUS; SOFT TISSUE at 12:04

## 2024-08-29 NOTE — PROGRESS NOTES
"Chief Complaint  Follow-up (On new medication/ Pt not taking due to heart and lung Dr telling him not to)    Subjective        Alejo Condon presents to [facility]  C/o follow up chronic illness   Follow-up  Conditions present:  Diabetes, Hypertension and Hyperlipidemia  Current symptoms include no chest pain, no shortness of breath, no fatigue, no blurred vision, no foot ulcerations and no polyuria. Medication compliance: good. Treatment compliance: all of the time. Treatment barriers: no compliance problems. Exercises intermittently.   Diabetes: He has type 2 diabetes mellitus. Current diabetic treatment: diet and oral medications. Meal planning: no current meal planning. Eye exam is current per patient.  He doesn't see a podiatrist.   Hypertension: The problem is controlled.     End-organ damage: no angina and no kidney disease  Current antihypertension treatment: diuretics, lifestyle changes and angiotensin blockers. Past treatments: lifestyle changes, diuretics and angiotensin blockers.   Hyperlipidemia: Exacerbating diseases: diabetes and obesity. Current antihyperlipidemic treatment: statins, diet change and exercise.   Additional hyperlipidemia comments: LDL still high.      Objective   Vital Signs:  /70 (BP Location: Right arm, Patient Position: Sitting, Cuff Size: Adult)   Pulse 78   Temp 97 °F (36.1 °C) (Temporal)   Resp 18   Ht 175.3 cm (69\")   Wt 121 kg (266 lb 6.4 oz)   SpO2 98%   BMI 39.34 kg/m²   Estimated body mass index is 39.34 kg/m² as calculated from the following:    Height as of this encounter: 175.3 cm (69\").    Weight as of this encounter: 121 kg (266 lb 6.4 oz).        Physical Exam  Vitals and nursing note reviewed.   Constitutional:       Appearance: Normal appearance. He is obese.   HENT:      Head: Normocephalic.      Right Ear: Tympanic membrane normal.      Left Ear: Tympanic membrane normal.      Nose: Nose normal.      Mouth/Throat:     "  Mouth: Mucous membranes are moist.   Eyes:      Extraocular Movements: Extraocular movements intact.      Pupils: Pupils are equal, round, and reactive to light.   Cardiovascular:      Rate and Rhythm: Normal rate and regular rhythm.      Pulses: Normal pulses.      Heart sounds: Normal heart sounds.   Pulmonary:      Effort: Pulmonary effort is normal.      Breath sounds: Normal breath sounds.   Abdominal:      General: Abdomen is protuberant. Bowel sounds are normal.      Palpations: Abdomen is soft.   Musculoskeletal:         General: Normal range of motion.      Cervical back: Normal range of motion and neck supple.   Skin:     General: Skin is warm.      Capillary Refill: Capillary refill takes less than 2 seconds.   Neurological:      General: No focal deficit present.      Mental Status: He is alert and oriented to person, place, and time.   Psychiatric:         Mood and Affect: Mood normal.        Result Review :  The following data was reviewed by: Prince Hernández MD on 08/29/2024:  Common labs          9/28/2023    15:51 6/11/2024    10:47 6/11/2024    10:48 8/15/2024    07:28   Common Labs   Glucose 97  136   94    BUN 19  16   14    Creatinine 0.92  0.77   0.78    Sodium 143  140   141    Potassium 3.9  4.2   4.1    Chloride 105  105   106    Calcium 9.6  9.2   8.9    Albumin 4.5       Total Bilirubin 0.5       Alkaline Phosphatase 68       AST (SGOT) 19       ALT (SGPT) 21       WBC 5.88  9.96   5.82    Hemoglobin 14.4  14.4   13.4    Hematocrit 42.5  45.2   41.9    Platelets 207  187   188    Total Cholesterol 172       Triglycerides 91       HDL Cholesterol 67       LDL Cholesterol  88       Hemoglobin A1C 6.20   6.4     PSA 2.530                   Assessment and Plan   Diagnoses and all orders for this visit:    1. Mixed hyperlipidemia (Primary)  Assessment & Plan:   Lipid abnormalities are improving with treatment    Plan:  Continue same medication/s without change.      Discussed medication  dosage, use, side effects, and goals of treatment in detail.    Counseled patient on lifestyle modifications to help control hyperlipidemia.     Patient Treatment Goals:   LDL goal is less than 70    Followup at the next regular appointment.    Orders:  -     CBC & Differential; Future  -     Comprehensive Metabolic Panel; Future  -     Hemoglobin A1c; Future  -     Lipid Panel; Future  -     MicroAlbumin, Urine, Random - Urine, Clean Catch  -     Vitamin B12; Future  -     Vitamin D,25-Hydroxy; Future    2. Type 2 diabetes mellitus with hyperglycemia, without long-term current use of insulin  Assessment & Plan:  Diabetes is improving with treatment.   Continue current treatment regimen.  Regular aerobic exercise.  Discussed ways to avoid symptomatic hypoglycemia.  Discussed sick day management.  Discussed foot care.  Reminded to get yearly retinal exam.  Diabetes will be reassessed in 6 months    Orders:  -     CBC & Differential; Future  -     Comprehensive Metabolic Panel; Future  -     Hemoglobin A1c; Future  -     Lipid Panel; Future  -     MicroAlbumin, Urine, Random - Urine, Clean Catch  -     Vitamin B12; Future  -     Vitamin D,25-Hydroxy; Future    3. Benign essential hypertension  Assessment & Plan:  Hypertension is stable and controlled    Blood pressure will be reassessed 6 mod    Orders:  -     CBC & Differential; Future  -     Comprehensive Metabolic Panel; Future  -     Hemoglobin A1c; Future  -     Lipid Panel; Future  -     MicroAlbumin, Urine, Random - Urine, Clean Catch  -     Vitamin B12; Future  -     Vitamin D,25-Hydroxy; Future    4. Anxiety  Comments:  Med increased today  Orders:  -     CBC & Differential; Future  -     Comprehensive Metabolic Panel; Future  -     Hemoglobin A1c; Future  -     Lipid Panel; Future  -     MicroAlbumin, Urine, Random - Urine, Clean Catch  -     Vitamin B12; Future  -     sertraline (ZOLOFT) 100 MG tablet; Take 1 tablet by mouth Daily With Dinner for 360 days.   Dispense: 90 tablet; Refill: 3  -     traZODone (DESYREL) 50 MG tablet; Take 1 tablet by mouth every night at bedtime.  Dispense: 90 tablet; Refill: 3  -     Vitamin D,25-Hydroxy; Future    5. Essential hypertension  -     losartan (COZAAR) 50 MG tablet; Take 1 tablet by mouth Daily With Breakfast for 360 days.  Dispense: 90 tablet; Refill: 3    6. Chronic systolic congestive heart failure  -     losartan (COZAAR) 50 MG tablet; Take 1 tablet by mouth Daily With Breakfast for 360 days.  Dispense: 90 tablet; Refill: 3    7. Anxiety  Comments:  med increased today  Orders:  -     CBC & Differential; Future  -     Comprehensive Metabolic Panel; Future  -     Hemoglobin A1c; Future  -     Lipid Panel; Future  -     MicroAlbumin, Urine, Random - Urine, Clean Catch  -     Vitamin B12; Future  -     sertraline (ZOLOFT) 100 MG tablet; Take 1 tablet by mouth Daily With Dinner for 360 days.  Dispense: 90 tablet; Refill: 3  -     traZODone (DESYREL) 50 MG tablet; Take 1 tablet by mouth every night at bedtime.  Dispense: 90 tablet; Refill: 3  -     Vitamin D,25-Hydroxy; Future    8. Encounter for medication refill    9. Age-related osteoporosis without current pathological fracture  -     Vitamin D,25-Hydroxy; Future    10. COPD with exacerbation  -     dexAMETHasone (DECADRON) injection 8 mg    Other orders  -     atorvastatin (LIPITOR) 20 MG tablet; Take 1 tablet by mouth Every Night. for 90 days  Dispense: 90 tablet; Refill: 3  -     meloxicam (MOBIC) 15 MG tablet; Take 1 tablet by mouth Daily With Lunch for 360 days.  Dispense: 90 tablet; Refill: 3  -     omeprazole (priLOSEC) 40 MG capsule; Take 1 capsule by mouth Daily With Lunch for 360 days.  Dispense: 90 capsule; Refill: 3  -     hydrOXYzine (ATARAX) 50 MG tablet; Take 1 tablet by mouth every night at bedtime.  Dispense: 90 tablet; Refill: 3  -     Fluticasone-Umeclidin-Vilant (Trelegy Ellipta) 200-62.5-25 MCG/ACT inhaler; Inhale 1 puff Daily for 28 days. 3 boxes   Dispense: 28 each; Refill: 0             Follow Up   Return in about 1 month (around 9/29/2024) for Recheck -- chronci illness.  Patient was given instructions and counseling regarding his condition or for health maintenance advice. Please see specific information pulled into the AVS if appropriate.           This document has been electronically signed by Prince Hernández MD  August 29, 2024 11:40 EDT

## 2024-08-29 NOTE — ASSESSMENT & PLAN NOTE
Diabetes is improving with treatment.   Continue current treatment regimen.  Regular aerobic exercise.  Discussed ways to avoid symptomatic hypoglycemia.  Discussed sick day management.  Discussed foot care.  Reminded to get yearly retinal exam.  Diabetes will be reassessed in 6 months

## 2024-10-29 ENCOUNTER — OFFICE VISIT (OUTPATIENT)
Dept: PULMONOLOGY | Facility: CLINIC | Age: 64
End: 2024-10-29
Payer: MEDICARE

## 2024-10-29 VITALS
WEIGHT: 256 LBS | DIASTOLIC BLOOD PRESSURE: 74 MMHG | HEART RATE: 75 BPM | HEIGHT: 69 IN | TEMPERATURE: 97.9 F | BODY MASS INDEX: 37.92 KG/M2 | SYSTOLIC BLOOD PRESSURE: 116 MMHG | OXYGEN SATURATION: 95 %

## 2024-10-29 DIAGNOSIS — J45.909 IDIOPATHIC ASTHMA: ICD-10-CM

## 2024-10-29 DIAGNOSIS — Q27.30 AVM (CONGENITAL ARTERIOVENOUS MALFORMATION): Primary | ICD-10-CM

## 2024-10-29 DIAGNOSIS — R94.2 RESTRICTIVE PATTERN PRESENT ON PULMONARY FUNCTION TESTING: ICD-10-CM

## 2024-10-29 DIAGNOSIS — Z87.891 FORMER SMOKER: ICD-10-CM

## 2024-10-29 DIAGNOSIS — G47.33 OSA (OBSTRUCTIVE SLEEP APNEA): ICD-10-CM

## 2024-10-29 DIAGNOSIS — J96.11 CHRONIC RESPIRATORY FAILURE WITH HYPOXIA: ICD-10-CM

## 2024-10-29 NOTE — PROGRESS NOTES
PULMONARY  NOTE    Chief Complaint     Pulmonary artery aneurysm, former smoker, obesity class II, diabetes mellitus, history congestive heart failure, hypoxic respiratory failure, rule out asthma, former smoker    History of Present Illness     64-year-old male returns today for follow-up  I last saw him 8/1/2024    He has a history of tobacco abuse, resolved October 2023    He carries a diagnosis of COPD but spirometry is normal  He remains on Trelegy with albuterol as needed  No recent acute exacerbation    He has a history of systolic heart failure although most recent transthoracic echocardiogram revealed improved LV systolic function    He uses supplemental oxygen at night  He carries a diagnosis of FELIPA    He was found to have a vascular structure in the right lower lobe on chest imaging  He underwent a pulmonary angiogram was found to have a pulmonary artery aneurysm, not an AVM  The plan is to follow with serial chest imaging    Patient Active Problem List   Diagnosis    Mixed hyperlipidemia    Acute systolic CHF (congestive heart failure)    Alcohol abuse    Age-related nuclear cataract of right eye    Benign essential hypertension    Diverticulitis    Erectile dysfunction    Irritable bowel syndrome (IBS)    Lumbar degenerative disc disease    Posterior tibial tendinitis of right lower extremity    Pseudophakia, right eye    Epiretinal membrane, right eye    Serous choroidal detachment of right eye    H/O vitrectomy    Other specified abdominal hernia without obstruction or gangrene    Anxiety    Former smoker (Stopped 10/2023)    Type 2 diabetes mellitus with hyperglycemia    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 for 90 days, Disp: 90 tablet, Rfl: 3    carvedilol (COREG) 6.25 MG tablet, Take 1 tablet by mouth 2 (Two) Times a Day With Meals., Disp: 180 tablet, Rfl: 3    empagliflozin (Jardiance) 10 MG tablet tablet, Take 1 tablet by mouth Every Morning., Disp: 90 tablet, Rfl: 3    furosemide (LASIX) 40 MG tablet, Take 1 tablet by mouth 2 (Two) Times a Day., Disp: 180 tablet, Rfl: 3    hydrOXYzine (ATARAX) 50 MG tablet, Take 1 tablet by mouth every night at bedtime., Disp: 90 tablet, Rfl: 3    losartan (COZAAR) 50 MG tablet, Take 1 tablet by mouth Daily With Breakfast for 360 days., Disp: 90 tablet, Rfl: 3    meloxicam (MOBIC) 15 MG tablet, Take 1 tablet by mouth Daily With Lunch for 360 days., Disp: 90 tablet, Rfl: 3    omeprazole (priLOSEC) 40 MG capsule, Take 1 capsule by mouth Daily With Lunch for 360 days., Disp: 90 capsule, Rfl: 3    sertraline (ZOLOFT) 100 MG tablet, Take 1 tablet by mouth Daily With Dinner for 360 days., Disp: 90 tablet, Rfl: 3    spironolactone (ALDACTONE) 25 MG tablet, Take 1 tablet by mouth Daily., Disp: 90 tablet, Rfl: 3    traZODone (DESYREL) 50 MG tablet, Take 1 tablet by mouth every night at bedtime., Disp: 90 tablet, Rfl: 3    buprenorphine-naloxone (SUBOXONE) 8-2 MG film film, PLACE 2.75 FILM UNDER TONGUE ONCE A DAY (Patient not taking: Reported on 10/29/2024), Disp: , Rfl:     Current Facility-Administered Medications:     cyanocobalamin injection 1,000 mcg, 1,000 mcg, Intramuscular, Q28 Days, Prince Hernández MD, 1,000 mcg at 08/29/24 1206    cyanocobalamin injection 1,000 mcg, 1,000 mcg, Intramuscular, Q28 Days, Kareem Varghese MD, 1,000 mcg at 06/11/24 1041  MEDICATION LIST AND ALLERGIES REVIEWED.    Family History   Problem Relation Age of Onset    Hypertension  "Mother     Hyperlipidemia Mother     Heart failure Mother     Heart attack Father 88    Cardiomyopathy Brother 36        Patient reports he had SCD that they attributed to agent orange     Social History     Tobacco Use    Smoking status: Former     Average packs/day: 0.3 packs/day for 20.0 years (5.0 ttl pk-yrs)     Types: Cigarettes     Start date: 2023     Quit date: 1967     Years since quittin.9     Passive exposure: Past    Smokeless tobacco: Never    Tobacco comments:     on and off since he was 6 years old   Vaping Use    Vaping status: Never Used   Substance Use Topics    Alcohol use: Yes     Alcohol/week: 2.0 standard drinks of alcohol     Types: 2 Cans of beer per week     Comment: \" I have had 6 beers in the last month\"    Drug use: Not Currently     Types: Marijuana     Comment: last smoked about 2023     Social History     Social History Narrative    caffeine use: 2 servings of coffee daily    Retired from maintenance, construction,     Stop smoking 2023    Previous heavy alcohol use, improved over the last 6 months     FAMILY AND SOCIAL HISTORY REVIEWED.    Review of Systems  IF PRESENT REFER TO SCANNED ROS SHEET FROM SAME DATE  OTHERWISE ROS OBTAINED AND NON-CONTRIBUTORY OVER HPI.    /74   Pulse 75   Temp 97.9 °F (36.6 °C) (Infrared)   Ht 175.3 cm (69.02\")   Wt 116 kg (256 lb)   SpO2 95% Comment: ROOM AIR AT REST  BMI 37.79 kg/m²   Physical Exam  Vitals and nursing note reviewed.   Constitutional:       General: He is not in acute distress.     Appearance: He is well-developed. He is not diaphoretic.   HENT:      Head: Normocephalic and atraumatic.   Neck:      Thyroid: No thyromegaly.   Cardiovascular:      Rate and Rhythm: Normal rate and regular rhythm.      Heart sounds: Normal heart sounds. No murmur heard.  Pulmonary:      Effort: Pulmonary effort is normal.      Breath sounds: Normal breath sounds. No stridor.   Lymphadenopathy:      " Cervical: No cervical adenopathy.      Upper Body:      Right upper body: No supraclavicular or epitrochlear adenopathy.      Left upper body: No supraclavicular or epitrochlear adenopathy.   Skin:     General: Skin is warm and dry.   Neurological:      Mental Status: He is alert and oriented to person, place, and time.   Psychiatric:         Behavior: Behavior normal.         Results     Immunization History   Administered Date(s) Administered    COVID-19 (PFIZER) Purple Cap Monovalent 12/24/2021    Covid-19 (Pfizer) Gray Cap Monovalent 01/20/2022    Influenza, Unspecified 01/15/2015    MMR 03/21/2012, 04/26/2012    PPD Test 08/18/2009, 09/01/2009, 12/07/2010, 12/15/2010, 03/19/2012, 02/13/2013, 02/26/2013, 02/26/2014    Pneumococcal Polysaccharide (PPSV23) 09/14/2018    Tdap 03/19/2012, 06/20/2017     Problem List       ICD-10-CM ICD-9-CM   1. 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 R/O FELIPA (obstructive sleep apnea)  G47.33 327.23   3. Restrictive pattern on PFTs w/o evidence of ILD  R94.2 794.2   4. Former smoker (Stopped 10/2023)  Z87.891 V15.82   5. Chronic respiratory failure with hypoxia  J96.11 518.83     799.02   6. R/o Asthma  J45.909 493.90       Discussion     We reviewed his pulmonary angiogram  His right lower lobe vascular structure appears to be a pulmonary artery aneurysm and not an AVM  The plan is to follow this with serial chest imaging    Also, would recommend annual CT screening for lung cancer given his smoking through October 2023  Therefore, we will get a follow-up CT scan of the chest in March 2025    I have encouraged smoking cessation efforts    He is can to remain on supplemental oxygen at night    He has albuterol to use on an as needed basis  Also Trelegy    I will see him back in 6 months or earlier if there are any problems in the meantime    Moderate level of Medical Decision Making complexity based on 1 undiagnosed new problem or 2 stable chronic conditions, independent  interpretation of tests, and/or prescription drug management     Uday Ruelas MD  Note electronically signed    CC: Prince Hernández MD

## 2024-10-30 DIAGNOSIS — Z87.891 FORMER SMOKER: Primary | ICD-10-CM

## 2024-11-27 ENCOUNTER — HOSPITAL ENCOUNTER (OUTPATIENT)
Dept: CT IMAGING | Facility: HOSPITAL | Age: 64
Discharge: HOME | End: 2024-11-27
Admitting: INTERNAL MEDICINE
Payer: MEDICARE

## 2024-11-27 DIAGNOSIS — Z87.891 FORMER SMOKER: ICD-10-CM

## 2024-11-27 PROCEDURE — 71250 CT THORAX DX C-: CPT

## 2024-12-03 ENCOUNTER — DOCUMENTATION (OUTPATIENT)
Dept: PULMONOLOGY | Facility: CLINIC | Age: 64
End: 2024-12-03
Payer: MEDICARE

## 2024-12-03 DIAGNOSIS — Q27.30 AVM (CONGENITAL ARTERIOVENOUS MALFORMATION): Primary | ICD-10-CM

## 2024-12-03 DIAGNOSIS — Z87.891 FORMER SMOKER: ICD-10-CM

## 2024-12-03 NOTE — PROGRESS NOTES
When I saw him in October I thought the plan was to get in a 1 year CT scan of the chest in March 2024.  He had the CT scan early.  Nothing new or suspicious from a pulmonary perspective.    Will just plan to see him back in a year with a repeat CT scan of the chest primarily for lung cancer screening    I discussed these results with the patient on the phone, scheduled a CT scan of the chest, and have asked my office staff to get him a follow-up appointment in roughly a year

## 2025-02-05 NOTE — PROGRESS NOTES
OFFICE VISIT  NOTE  [facility] CARDIOLOGY      Name: Alejo Condon    Date: 2025  MRN:  [id number]  :  1960      REFERRING/PRIMARY PROVIDER:  Provider, No Known     Chief Complaint   Patient presents with    Acute systolic CHF (congestive heart failure)     HPI: Alejo Condon is a 64 y.o. male who presents today for follow up of systolic CHF.  Associated history of hypertension, previous opioid dependence, alcohol abuse, tobacco abuse.  ER 3/2020 for productive cough, treated for bronchitis, found to have ejection fraction 31% on echo 2020, diastolic dysfunction, biatrial enlargement.  Negative cath 2020.  Did not take Entresto due to cost, replaced with Losartan over a year ago.  He stopped drinking and smoking 10/2023.   Currently drinking 6 pack of beer a week. He denies any cardiac symptoms, no chest pain, no DAMIAN, no orthopnea, or significant LE edema. He is trying to eat healthy, staying away from too much sodium. He does have issues with ED, and is asking for medication to help in that regard.     ROS:Pertinent positives as listed in the HPI.  All other systems reviewed and negative.    Past Medical History:   Diagnosis Date    Acute systolic CHF (congestive heart failure) 2020    Echo 20 LVEF = 31.0%. Left ventricular diastolic dysfunction is noted (grade III w/high LAP) consistent with reversible restrictive pattern Left atrial volume is moderately increased.    Diverticulitis     Elevated cholesterol     Hyperlipidemia     Hypertension     Opiate addiction     Resolved since on suboxone    R/o Asthma 10/29/2024    R/O FELIPA (obstructive sleep apnea) 2024       Past Surgical History:   Procedure Laterality Date    ANAL FISTULA REPAIR N/A     CARDIAC CATHETERIZATION N/A 2020    Procedure: Left Heart Cath;  Surgeon: Garrett Trejo MD;  Location:  [facility];  Service: Cardiovascular;  Laterality: N/A;    COLONOSCOPY       "COLONOSCOPY N/A 2023    Procedure: COLONOSCOPY FOR SCREENING;  Surgeon: Crystal Rodriguez MD;  Location:  [facility];  Service: Gastroenterology;  Laterality: N/A;    ENDOSCOPY      ENDOSCOPY N/A 2023    Procedure: ESOPHAGOGASTRODUODENOSCOPY WITH BIOPSY;  Surgeon: Crystal Rodriguez MD;  Location:  [facility];  Service: Gastroenterology;  Laterality: N/A;    FINGER SURGERY      INTERVENTIONAL RADIOLOGY PROCEDURE N/A 8/15/2024    Procedure: Pulmonary Angiogram and Embolization;  Surgeon: Jean Paul Garcia MD;  Location:  [facility];  Service: Interventional Radiology;  Laterality: N/A;    REFRACTIVE SURGERY      RETINAL DETACHMENT REPAIR  2021       Social History     Socioeconomic History    Marital status:    Tobacco Use    Smoking status: Former     Average packs/day: 0.3 packs/day for 20.0 years (5.0 ttl pk-yrs)     Types: Cigarettes     Start date: 2023     Quit date: 1967     Years since quittin.2     Passive exposure: Past    Smokeless tobacco: Never    Tobacco comments:     on and off since he was 6 years old   Vaping Use    Vaping status: Never Used   Substance and Sexual Activity    Alcohol use: Yes     Alcohol/week: 2.0 standard drinks of alcohol     Types: 2 Cans of beer per week     Comment: \" I have had 6 beers in the last month\"    Drug use: Not Currently     Types: Marijuana, Oxycodone     Comment: last smoked about 2023    Sexual activity: Defer       Family History   Problem Relation Age of Onset    Hypertension Mother     Hyperlipidemia Mother     Heart failure Mother     Heart attack Father 88    Cardiomyopathy Brother 36        Patient reports he had SCD that they attributed to agent orange        No Known Allergies    Current Outpatient Medications   Medication Instructions    albuterol sulfate  (90 Base) MCG/ACT inhaler 2 puffs, Inhalation, Every 4 Hours PRN    atorvastatin (LIPITOR) 20 mg, Oral, Nightly, for 90 days " "   carvedilol (COREG) 6.25 mg, Oral, 2 Times Daily With Meals    empagliflozin (JARDIANCE) 10 mg, Oral, Every Morning    furosemide (LASIX) 40 mg, Oral, 2 Times Daily    hydrOXYzine (ATARAX) 50 mg, Oral, Every Night at Bedtime    losartan (COZAAR) 50 mg, Oral, Daily With Breakfast    sildenafil (VIAGRA) 50 mg, Oral, Daily PRN    spironolactone (ALDACTONE) 25 mg, Oral, Daily    traZODone (DESYREL) 50 mg, Oral, Every Night at Bedtime       Vitals:    02/06/25 1053   BP: 118/78   BP Location: Left arm   Patient Position: Sitting   Pulse: 76   SpO2: 95%   Weight: 112 kg (246 lb)   Height: 177.8 cm (70\")     Body mass index is 35.3 kg/m².    PHYSICAL EXAM:    General Appearance:   well developed  well nourished  Neck:  thyroid not enlarged  supple  Respiratory:  no respiratory distress  normal breath sounds  no rales  Cardiovascular:  no jugular venous distention  regular rhythm  apical impulse normal  S1 normal, S2 normal  no S3, no S4   no murmur  no rub, no thrill  lower extremity edema:1+ BLE    Skin:   warm, dry    RESULTS:   Procedures    Results for orders placed during the hospital encounter of 05/10/24    Adult Transthoracic Echo Complete w/ Color, Spectral and Contrast if necessary per protocol    Interpretation Summary    Left ventricular systolic function is normal. Left ventricular ejection fraction appears to be 51 - 55%.    Left ventricular wall thickness is consistent with hypertrophy.    Left ventricular diastolic function was normal.    The right ventricular cavity is dilated.    The left atrial cavity is dilated.    No significant change compared to 2022        Labs:  Lab Results   Component Value Date    CHOL 172 09/28/2023    TRIG 91 09/28/2023    HDL 67 (H) 09/28/2023    LDL 88 09/28/2023    AST 19 09/28/2023    ALT 21 09/28/2023     Lab Results   Component Value Date    HGBA1C 6.4 (A) 06/11/2024     Creatinine   Date Value Ref Range Status   08/15/2024 0.78 0.76 - 1.27 mg/dL Final   06/11/2024 0.77 " 0.76 - 1.27 mg/dL Final   09/28/2023 0.92 0.76 - 1.27 mg/dL Final   08/25/2020 0.80 0.60 - 1.30 mg/dL Final     Comment:     Serial Number: [id number]:  [id number]     eGFR Non  Amer   Date Value Ref Range Status   07/10/2021 81 >60 mL/min/1.73 Final   04/30/2021 78 >60 mL/min/1.73 Final   01/24/2021 79 >60 mL/min/1.73 Final       ASSESSMENT:  Problem List Items Addressed This Visit          Cardiac and Vasculature    Mixed hyperlipidemia    Acute systolic CHF (congestive heart failure) - Primary    Overview     8/2021 Limited Echo: LVEF 36-40%    Echo 5/6/20 LVEF = 31.0%, Left ventricular diastolic dysfunction is noted (grade III w/high LAP) consistent with reversible restrictive pattern, left atrial volume is moderately increased.    8/25/2020: Normal cardiac catheterization, normal coronaries, LVEDP 5 mmHg.         Benign essential hypertension       PLAN:    1.  Acute on chronic systolic heart failure, improved on last echo  EF 36-40% 8/2021  Normal coronaries 8/25/2020, presumptive etiology will be alcohol and hypertension related  Repeat echo 5/2024 with LVEF 51-55%  Continue spironolactone 25 mg daily, carvedilol and Losartan   Could not afford Entresto, on Losartan    He has been on Jardiance in the past, and we will refill this today with samples also provided   He is taking Lasix 40mg once a day most of the time, occasionally will take it twice a day     Continue low-sodium diet  Discussed importance of alcohol abstinence to prevent another heart failure episode      2.  Essential hypertension:  BP well controlled   Continue Losartan, Carvedilol and Spironolactone      3.  Alcohol and tobacco abuse  He quit smoking 10/2023, congratulated and advised continued abstinence  Drinking 6 pack of beer a week, advised caution with alcohol due to previous history     4. ED  Will provide access to Viagra 50mg PRN, however advised future refills will need to come from PCP  Provided patient with list of PCP  providers within [facility]          Advance Care Planning   ACP discussion was held with the patient during this visit. Patient does not have an advance directive, declines further assistance.          Follow-up   Return in about 9 months (around 11/6/2025) for with RDS .        Electronically signed by Annelise Summers PA-C, 02/06/25, 1:19 PM EST.

## 2025-02-06 ENCOUNTER — OFFICE VISIT (OUTPATIENT)
Dept: CARDIOLOGY | Facility: CLINIC | Age: 65
End: 2025-02-06
Payer: MEDICARE

## 2025-02-06 VITALS
DIASTOLIC BLOOD PRESSURE: 78 MMHG | WEIGHT: 246 LBS | HEART RATE: 76 BPM | SYSTOLIC BLOOD PRESSURE: 118 MMHG | BODY MASS INDEX: 35.22 KG/M2 | OXYGEN SATURATION: 95 % | HEIGHT: 70 IN

## 2025-02-06 DIAGNOSIS — I50.21 ACUTE SYSTOLIC CHF (CONGESTIVE HEART FAILURE): Primary | ICD-10-CM

## 2025-02-06 DIAGNOSIS — I10 BENIGN ESSENTIAL HYPERTENSION: ICD-10-CM

## 2025-02-06 DIAGNOSIS — E78.2 MIXED HYPERLIPIDEMIA: ICD-10-CM

## 2025-02-06 RX ORDER — SILDENAFIL 50 MG/1
50 TABLET, FILM COATED ORAL DAILY PRN
Qty: 30 TABLET | Refills: 0 | Status: SHIPPED | OUTPATIENT
Start: 2025-02-06

## 2025-05-16 DIAGNOSIS — I10 ESSENTIAL HYPERTENSION: ICD-10-CM

## 2025-05-16 DIAGNOSIS — I50.22 CHRONIC SYSTOLIC CONGESTIVE HEART FAILURE: ICD-10-CM

## 2025-05-16 RX ORDER — FUROSEMIDE 40 MG/1
40 TABLET ORAL 2 TIMES DAILY
Qty: 180 TABLET | Refills: 1 | Status: SHIPPED | OUTPATIENT
Start: 2025-05-16

## 2025-05-16 RX ORDER — CARVEDILOL 6.25 MG/1
6.25 TABLET ORAL 2 TIMES DAILY WITH MEALS
Qty: 180 TABLET | Refills: 1 | Status: SHIPPED | OUTPATIENT
Start: 2025-05-16

## 2025-05-31 ENCOUNTER — HOSPITAL ENCOUNTER (EMERGENCY)
Facility: HOSPITAL | Age: 65
Discharge: LEFT BEFORE DISCHARGE | End: 2025-05-31
Payer: MEDICARE

## 2025-05-31 VITALS
HEIGHT: 69 IN | OXYGEN SATURATION: 96 % | BODY MASS INDEX: 32.58 KG/M2 | SYSTOLIC BLOOD PRESSURE: 128 MMHG | HEART RATE: 99 BPM | WEIGHT: 220 LBS | RESPIRATION RATE: 16 BRPM | TEMPERATURE: 98.1 F | DIASTOLIC BLOOD PRESSURE: 75 MMHG

## 2025-05-31 PROCEDURE — 99211 OFF/OP EST MAY X REQ PHY/QHP: CPT

## 2025-06-02 ENCOUNTER — HOSPITAL ENCOUNTER (OUTPATIENT)
Facility: HOSPITAL | Age: 65
Setting detail: OBSERVATION
Discharge: LEFT BEFORE DISCHARGE | End: 2025-06-03
Attending: EMERGENCY MEDICINE | Admitting: EMERGENCY MEDICINE
Payer: MEDICARE

## 2025-06-02 ENCOUNTER — APPOINTMENT (OUTPATIENT)
Dept: GENERAL RADIOLOGY | Facility: HOSPITAL | Age: 65
End: 2025-06-02
Payer: MEDICARE

## 2025-06-02 DIAGNOSIS — L03.113 CELLULITIS OF RIGHT UPPER EXTREMITY: Primary | ICD-10-CM

## 2025-06-02 DIAGNOSIS — M70.21 OLECRANON BURSITIS OF RIGHT ELBOW: ICD-10-CM

## 2025-06-02 LAB
ALBUMIN SERPL-MCNC: 3.5 G/DL (ref 3.5–5.2)
ALBUMIN/GLOB SERPL: 1.3 G/DL
ALP SERPL-CCNC: 58 U/L (ref 39–117)
ALT SERPL W P-5'-P-CCNC: 39 U/L (ref 1–41)
ANION GAP SERPL CALCULATED.3IONS-SCNC: 11 MMOL/L (ref 5–15)
AST SERPL-CCNC: 40 U/L (ref 1–40)
BASOPHILS # BLD AUTO: 0.03 10*3/MM3 (ref 0–0.2)
BASOPHILS NFR BLD AUTO: 0.2 % (ref 0–1.5)
BILIRUB SERPL-MCNC: 0.4 MG/DL (ref 0–1.2)
BUN SERPL-MCNC: 8.9 MG/DL (ref 8–23)
BUN/CREAT SERPL: 9.9 (ref 7–25)
CALCIUM SPEC-SCNC: 8.2 MG/DL (ref 8.6–10.5)
CHLORIDE SERPL-SCNC: 98 MMOL/L (ref 98–107)
CO2 SERPL-SCNC: 24 MMOL/L (ref 22–29)
CREAT SERPL-MCNC: 0.9 MG/DL (ref 0.76–1.27)
CRP SERPL-MCNC: 13.48 MG/DL (ref 0–0.5)
D-LACTATE SERPL-SCNC: 1.4 MMOL/L (ref 0.5–2)
DEPRECATED RDW RBC AUTO: 46.2 FL (ref 37–54)
EGFRCR SERPLBLD CKD-EPI 2021: 94.2 ML/MIN/1.73
EOSINOPHIL # BLD AUTO: 0.02 10*3/MM3 (ref 0–0.4)
EOSINOPHIL NFR BLD AUTO: 0.1 % (ref 0.3–6.2)
ERYTHROCYTE [DISTWIDTH] IN BLOOD BY AUTOMATED COUNT: 13.7 % (ref 12.3–15.4)
ERYTHROCYTE [SEDIMENTATION RATE] IN BLOOD: 62 MM/HR (ref 0–20)
GLOBULIN UR ELPH-MCNC: 2.7 GM/DL
GLUCOSE SERPL-MCNC: 189 MG/DL (ref 65–99)
HCT VFR BLD AUTO: 43 % (ref 37.5–51)
HGB BLD-MCNC: 14.4 G/DL (ref 13–17.7)
IMM GRANULOCYTES # BLD AUTO: 0.04 10*3/MM3 (ref 0–0.05)
IMM GRANULOCYTES NFR BLD AUTO: 0.3 % (ref 0–0.5)
LYMPHOCYTES # BLD AUTO: 1.19 10*3/MM3 (ref 0.7–3.1)
LYMPHOCYTES NFR BLD AUTO: 8.9 % (ref 19.6–45.3)
MCH RBC QN AUTO: 30.3 PG (ref 26.6–33)
MCHC RBC AUTO-ENTMCNC: 33.5 G/DL (ref 31.5–35.7)
MCV RBC AUTO: 90.5 FL (ref 79–97)
MONOCYTES # BLD AUTO: 1.15 10*3/MM3 (ref 0.1–0.9)
MONOCYTES NFR BLD AUTO: 8.6 % (ref 5–12)
NEUTROPHILS NFR BLD AUTO: 10.94 10*3/MM3 (ref 1.7–7)
NEUTROPHILS NFR BLD AUTO: 81.9 % (ref 42.7–76)
NRBC BLD AUTO-RTO: 0 /100 WBC (ref 0–0.2)
PLATELET # BLD AUTO: 191 10*3/MM3 (ref 140–450)
PMV BLD AUTO: 11 FL (ref 6–12)
POTASSIUM SERPL-SCNC: 3.7 MMOL/L (ref 3.5–5.2)
PROCALCITONIN SERPL-MCNC: 0.09 NG/ML (ref 0–0.25)
PROT SERPL-MCNC: 6.2 G/DL (ref 6–8.5)
RBC # BLD AUTO: 4.75 10*6/MM3 (ref 4.14–5.8)
SODIUM SERPL-SCNC: 133 MMOL/L (ref 136–145)
WBC NRBC COR # BLD AUTO: 13.37 10*3/MM3 (ref 3.4–10.8)

## 2025-06-02 PROCEDURE — 85652 RBC SED RATE AUTOMATED: CPT | Performed by: EMERGENCY MEDICINE

## 2025-06-02 PROCEDURE — 96375 TX/PRO/DX INJ NEW DRUG ADDON: CPT

## 2025-06-02 PROCEDURE — 83036 HEMOGLOBIN GLYCOSYLATED A1C: CPT | Performed by: INTERNAL MEDICINE

## 2025-06-02 PROCEDURE — 86140 C-REACTIVE PROTEIN: CPT | Performed by: EMERGENCY MEDICINE

## 2025-06-02 PROCEDURE — 36415 COLL VENOUS BLD VENIPUNCTURE: CPT

## 2025-06-02 PROCEDURE — 96365 THER/PROPH/DIAG IV INF INIT: CPT

## 2025-06-02 PROCEDURE — 87641 MR-STAPH DNA AMP PROBE: CPT

## 2025-06-02 PROCEDURE — 80053 COMPREHEN METABOLIC PANEL: CPT | Performed by: EMERGENCY MEDICINE

## 2025-06-02 PROCEDURE — 25010000002 CEFTRIAXONE PER 250 MG: Performed by: EMERGENCY MEDICINE

## 2025-06-02 PROCEDURE — 96367 TX/PROPH/DG ADDL SEQ IV INF: CPT

## 2025-06-02 PROCEDURE — 25010000002 HYDROMORPHONE 1 MG/ML SOLUTION: Performed by: EMERGENCY MEDICINE

## 2025-06-02 PROCEDURE — 87040 BLOOD CULTURE FOR BACTERIA: CPT | Performed by: EMERGENCY MEDICINE

## 2025-06-02 PROCEDURE — 85025 COMPLETE CBC W/AUTO DIFF WBC: CPT | Performed by: EMERGENCY MEDICINE

## 2025-06-02 PROCEDURE — 25810000003 SEPSIS FLUID NS 0.9 % SOLUTION: Performed by: EMERGENCY MEDICINE

## 2025-06-02 PROCEDURE — 73070 X-RAY EXAM OF ELBOW: CPT

## 2025-06-02 PROCEDURE — 25010000002 VANCOMYCIN 1 G RECONSTITUTED SOLUTION: Performed by: EMERGENCY MEDICINE

## 2025-06-02 PROCEDURE — 25810000003 SODIUM CHLORIDE 0.9 % SOLUTION: Performed by: EMERGENCY MEDICINE

## 2025-06-02 PROCEDURE — 83605 ASSAY OF LACTIC ACID: CPT | Performed by: EMERGENCY MEDICINE

## 2025-06-02 PROCEDURE — 99285 EMERGENCY DEPT VISIT HI MDM: CPT

## 2025-06-02 PROCEDURE — 25010000002 ONDANSETRON PER 1 MG: Performed by: EMERGENCY MEDICINE

## 2025-06-02 PROCEDURE — 96376 TX/PRO/DX INJ SAME DRUG ADON: CPT

## 2025-06-02 PROCEDURE — 84145 PROCALCITONIN (PCT): CPT | Performed by: EMERGENCY MEDICINE

## 2025-06-02 PROCEDURE — 25010000002 HYDROMORPHONE PER 4 MG: Performed by: EMERGENCY MEDICINE

## 2025-06-02 RX ORDER — ONDANSETRON 2 MG/ML
4 INJECTION INTRAMUSCULAR; INTRAVENOUS ONCE AS NEEDED
Status: COMPLETED | OUTPATIENT
Start: 2025-06-02 | End: 2025-06-02

## 2025-06-02 RX ORDER — HYDROMORPHONE HYDROCHLORIDE 1 MG/ML
0.5 INJECTION, SOLUTION INTRAMUSCULAR; INTRAVENOUS; SUBCUTANEOUS
Refills: 0 | Status: DISCONTINUED | OUTPATIENT
Start: 2025-06-02 | End: 2025-06-03 | Stop reason: SDUPTHER

## 2025-06-02 RX ADMIN — SODIUM CHLORIDE 2250 MG: 9 INJECTION, SOLUTION INTRAVENOUS at 22:24

## 2025-06-02 RX ADMIN — HYDROMORPHONE HYDROCHLORIDE 0.5 MG: 1 INJECTION, SOLUTION INTRAMUSCULAR; INTRAVENOUS; SUBCUTANEOUS at 21:47

## 2025-06-02 RX ADMIN — ONDANSETRON 4 MG: 2 INJECTION INTRAMUSCULAR; INTRAVENOUS at 21:47

## 2025-06-02 RX ADMIN — SODIUM CHLORIDE 2000 MG: 900 INJECTION INTRAVENOUS at 21:48

## 2025-06-02 RX ADMIN — HYDROMORPHONE HYDROCHLORIDE 1 MG: 1 INJECTION, SOLUTION INTRAMUSCULAR; INTRAVENOUS; SUBCUTANEOUS at 23:48

## 2025-06-02 RX ADMIN — SODIUM CHLORIDE 2670 ML: 9 INJECTION, SOLUTION INTRAVENOUS at 23:22

## 2025-06-02 NOTE — ED PROVIDER NOTES
Subjective   History of Present Illness  Patient is a pleasant 65-year-old male who presents to the emergency department with redness and swelling of his right forearm along with fatigue and lightheadedness today.  States that 2 weeks ago developed the scab on his right elbow which is now still extended to have swelling and redness from the elbow down through the right hand.  This been getting getting progressively worse over the past 2 weeks.  Came to the emergency department 2 nights ago was in the waiting room and left without being seen at that time.  He today had a near syncopal event while walking in his house and this combination of symptoms prompted him to go to the urgent treatment center.  He is referred from the urgent treatment center here to the ER for further evaluation.  He was told the treatment center that he would likely need IV antibiotics for this infection.  2 weeks right arm swelling.  Scab was picked off elbow just prior to the swelling.        Review of Systems   All other systems reviewed and are negative.      Past Medical History:   Diagnosis Date    Acute systolic CHF (congestive heart failure) 05/13/2020    Echo 5/6/20 LVEF = 31.0%. Left ventricular diastolic dysfunction is noted (grade III w/high LAP) consistent with reversible restrictive pattern Left atrial volume is moderately increased.    Diverticulitis     Elevated cholesterol     Hyperlipidemia     Hypertension     Opiate addiction     Resolved since on suboxone    R/o Asthma 10/29/2024    R/O FELIPA (obstructive sleep apnea) 8/1/2024       No Known Allergies    Past Surgical History:   Procedure Laterality Date    ANAL FISTULA REPAIR N/A     CARDIAC CATHETERIZATION N/A 08/25/2020    Procedure: Left Heart Cath;  Surgeon: Garrett Trejo MD;  Location: [facility] CATH INVASIVE LOCATION;  Service: Cardiovascular;  Laterality: N/A;    COLONOSCOPY      COLONOSCOPY N/A 9/12/2023    Procedure: COLONOSCOPY FOR SCREENING;  Surgeon:  "Crystal Rodriguez MD;  Location:  [facility] OR;  Service: Gastroenterology;  Laterality: N/A;    ENDOSCOPY      ENDOSCOPY N/A 2023    Procedure: ESOPHAGOGASTRODUODENOSCOPY WITH BIOPSY;  Surgeon: Crystal Rodriguez MD;  Location:  [facility] OR;  Service: Gastroenterology;  Laterality: N/A;    FINGER SURGERY      INTERVENTIONAL RADIOLOGY PROCEDURE N/A 8/15/2024    Procedure: Pulmonary Angiogram and Embolization;  Surgeon: Jean Paul Garcia MD;  Location:  [facility] CATH INVASIVE LOCATION;  Service: Interventional Radiology;  Laterality: N/A;    REFRACTIVE SURGERY      RETINAL DETACHMENT REPAIR  2021       Family History   Problem Relation Age of Onset    Hypertension Mother     Hyperlipidemia Mother     Heart failure Mother     Heart attack Father 88    Cardiomyopathy Brother 36        Patient reports he had SCD that they attributed to agent orange       Social History     Socioeconomic History    Marital status:    Tobacco Use    Smoking status: Former     Average packs/day: 0.3 packs/day for 20.0 years (5.0 ttl pk-yrs)     Types: Cigarettes     Start date: 2023     Quit date: 1967     Years since quittin.5     Passive exposure: Past    Smokeless tobacco: Never    Tobacco comments:     on and off since he was 6 years old   Vaping Use    Vaping status: Never Used   Substance and Sexual Activity    Alcohol use: Yes     Alcohol/week: 2.0 standard drinks of alcohol     Types: 2 Cans of beer per week     Comment: \" I have had 6 beers in the last month\"    Drug use: Not Currently     Types: Marijuana, Oxycodone     Comment: last smoked about 2023    Sexual activity: Defer           Objective   Physical Exam  Vitals and nursing note reviewed.   Constitutional:       General: He is not in acute distress.     Appearance: Normal appearance. He is ill-appearing.   HENT:      Head: Normocephalic and atraumatic.   Eyes:      Conjunctiva/sclera: Conjunctivae normal.      Pupils: Pupils are " equal, round, and reactive to light.   Neck:      Thyroid: No thyromegaly.   Cardiovascular:      Rate and Rhythm: Normal rate and regular rhythm.      Heart sounds: Normal heart sounds. No murmur heard.     No friction rub. No gallop.   Pulmonary:      Effort: Pulmonary effort is normal. No respiratory distress.      Breath sounds: Normal breath sounds.   Abdominal:      General: Bowel sounds are normal.      Palpations: Abdomen is soft.      Tenderness: There is no abdominal tenderness.   Musculoskeletal:         General: Swelling and tenderness present. Normal range of motion.      Cervical back: Normal range of motion and neck supple.   Lymphadenopathy:      Cervical: No cervical adenopathy.   Skin:     General: Skin is warm and dry.      Capillary Refill: Capillary refill takes less than 2 seconds.      Findings: Erythema (Right upper extremity from the elbow down through the right hand) present.      Comments: Erythema is diffuse with no focal location over any particular joint.  There is some fluctuance over the extensor surface of the right elbow, consistent with possible bursitis   Neurological:      General: No focal deficit present.      Mental Status: He is oriented to person, place, and time.   Psychiatric:         Behavior: Behavior normal.         Thought Content: Thought content normal.         Procedures           ED Course       Latest Reference Range & Units 06/02/25 21:06   Sodium 136 - 145 mmol/L 133 (L)   Potassium 3.5 - 5.2 mmol/L 3.7   Chloride 98 - 107 mmol/L 98   CO2 22.0 - 29.0 mmol/L 24.0   Anion Gap 5.0 - 15.0 mmol/L 11.0   BUN 8.0 - 23.0 mg/dL 8.9   Creatinine 0.76 - 1.27 mg/dL 0.90   BUN/Creatinine Ratio 7.0 - 25.0  9.9   eGFR >60.0 mL/min/1.73 94.2   Glucose 65 - 99 mg/dL 189 (H)   Calcium 8.6 - 10.5 mg/dL 8.2 (L)   Alkaline Phosphatase 39 - 117 U/L 58   Total Protein 6.0 - 8.5 g/dL 6.2   Albumin 3.5 - 5.2 g/dL 3.5   Globulin gm/dL 2.7   A/G Ratio g/dL 1.3   AST (SGOT) 1 - 40 U/L 40    ALT (SGPT) 1 - 41 U/L 39   Total Bilirubin 0.0 - 1.2 mg/dL 0.4   Hemoglobin A1C 4.80 - 5.60 % 5.90 (H)   C-Reactive Protein 0.00 - 0.50 mg/dL 13.48 (H)   Lactate 0.5 - 2.0 mmol/L 1.4   Procalcitonin 0.00 - 0.25 ng/mL 0.09   WBC 3.40 - 10.80 10*3/mm3 13.37 (H)   RBC 4.14 - 5.80 10*6/mm3 4.75   Hemoglobin 13.0 - 17.7 g/dL 14.4   Hematocrit 37.5 - 51.0 % 43.0   Platelets 140 - 450 10*3/mm3 191   RDW 12.3 - 15.4 % 13.7   MCV 79.0 - 97.0 fL 90.5   MCH 26.6 - 33.0 pg 30.3   MCHC 31.5 - 35.7 g/dL 33.5   MPV 6.0 - 12.0 fL 11.0   RDW-SD 37.0 - 54.0 fl 46.2   Neutrophil Rel % 42.7 - 76.0 % 81.9 (H)   Lymphocyte Rel % 19.6 - 45.3 % 8.9 (L)   Monocyte Rel % 5.0 - 12.0 % 8.6   Eosinophil Rel % 0.3 - 6.2 % 0.1 (L)   Basophil Rel % 0.0 - 1.5 % 0.2   Immature Granulocyte Rel % 0.0 - 0.5 % 0.3   Neutrophils Absolute 1.70 - 7.00 10*3/mm3 10.94 (H)   Lymphocytes Absolute 0.70 - 3.10 10*3/mm3 1.19   Monocytes Absolute 0.10 - 0.90 10*3/mm3 1.15 (H)   Eosinophils Absolute 0.00 - 0.40 10*3/mm3 0.02   Basophils Absolute 0.00 - 0.20 10*3/mm3 0.03   Immature Grans, Absolute 0.00 - 0.05 10*3/mm3 0.04   nRBC 0.0 - 0.2 /100 WBC 0.0   Sed Rate 0 - 20 mm/hr 62 (H)   (L): Data is abnormally low  (H): Data is abnormally high    MRI Elbow Right With & Without Contrast   Final Result   1.There is a large rim-enhancing subcutaneous collection at the olecranon bursa. There is extensive soft tissue edema. Abscess is favored over a bland bursitis.. There is no sinus tract to the skin surface or joint.   2.There is soft tissue edema and enhancement in the anterior elbow. Cellulitis with possible developing myositis.   3.There is mild arthritis of the elbow with small joint effusion.               Electronically Signed: Indu Wang MD     6/3/2025 11:29 AM EDT     Workstation ID: [id number]      XR Elbow 2 View Right   Final Result   Impression:      1. Subcutaneous fat stranding about the elbow whether edema or whether related to cellulitis. No evidence  of foreign body.      2. Small elbow joint effusion.      3. No evidence of acute or healing or bony trauma of the right elbow.         Electronically Signed: Tato Alvares MD     6/2/2025 11:05 PM EDT     Workstation ID: [id number]        Vitals:    06/03/25 0751 06/03/25 1115 06/03/25 1557 06/03/25 1855   BP: 120/79  99/73 117/74   BP Location: Left arm  Left arm Left arm   Patient Position: Lying  Lying Lying   Pulse: 113 111 83 106   Resp: 18 18 18 18   Temp: 98.8 °F (37.1 °C) 97.7 °F (36.5 °C) 99.4 °F (37.4 °C)    TempSrc: Oral Oral Oral    SpO2: 93%   94%   Weight:       Height:         Medications   sodium chloride 0.9 % infusion (0 mL/hr Intravenous Stopped 6/3/25 1130)   cefTRIAXone (ROCEPHIN) 2,000 mg in sodium chloride 0.9 % 100 mL MBP (0 mg Intravenous Stopped 6/2/25 2224)   vancomycin 2250 mg/500 mL 0.9% NS IVPB ([facility]) (0 mg Intravenous Stopped 6/3/25 0044)   ondansetron (ZOFRAN) injection 4 mg (4 mg Intravenous Given 6/2/25 2147)   sepsis fluid NS 0.9 % bolus 2,670 mL (2,670 mL Intravenous New Bag 6/2/25 2322)   HYDROmorphone (DILAUDID) injection 1 mg (1 mg Intravenous Given 6/2/25 2348)   fentaNYL citrate (PF) (SUBLIMAZE) injection 50 mcg (50 mcg Intravenous Given 6/3/25 0035)   potassium chloride (KLOR-CON M20) CR tablet 40 mEq (40 mEq Oral Given 6/3/25 1540)   gadobenate dimeglumine (MULTIHANCE) injection 20 mL (20 mL Intravenous Given 6/3/25 1100)     ECG/EMG Results (last 24 hours)       ** No results found for the last 24 hours. **          ECG 12 Lead Tachycardia   Final Result   Test Reason : Tachycardia   Blood Pressure :   */*   mmHG   Vent. Rate : 106 BPM     Atrial Rate : 106 BPM      P-R Int : 176 ms          QRS Dur : 116 ms       QT Int : 370 ms       P-R-T Axes :  34   6 103 degrees     QTcB Int : 491 ms      Sinus tachycardia with occasional premature ventricular complexes and   fusion complexes   T wave abnormality, consider lateral ischemia   Abnormal ECG   When compared with ECG of  24-Jan-2021 00:33,   fusion complexes are now present   premature ventricular complexes are now present   aberrant conduction is no longer present   Nonspecific T wave abnormality, improved in Inferior leads   T wave inversion less evident in Anterolateral leads   Confirmed by RL OSUNA ([id number]) on 6/3/2025 2:00:00 PM   Also confirmed by RL OSUNA ([id number])  on 6/3/2025 2:00:12 PM      Referred By: CONCETTA           Confirmed By: RL OSUNA      Telemetry Scan   Final Result                                                           Medical Decision Making  Problems Addressed:  Cellulitis of right upper extremity: complicated acute illness or injury  Olecranon bursitis of right elbow: complicated acute illness or injury    Amount and/or Complexity of Data Reviewed  External Data Reviewed: notes.  Labs: ordered. Decision-making details documented in ED Course.  Radiology: ordered and independent interpretation performed. Decision-making details documented in ED Course.    Risk  Prescription drug management.  Decision regarding hospitalization.        Final diagnoses:   Cellulitis of right upper extremity   Olecranon bursitis of right elbow       ED Disposition  ED Disposition       ED Disposition   Decision to Admit    Condition   --    Comment   Level of Care: Telemetry [5]   Diagnosis: Cellulitis of right upper extremity [029007]   Is patient appropriate for Inpatient Observation Unit?: No [0]   Bed Request Comments: 3g/h                  Jose Trinidad DO  06/06/25 2252

## 2025-06-02 NOTE — Clinical Note
Level of Care: Telemetry [5]   Diagnosis: Cellulitis of right upper extremity [257002]   Admitting Physician: NIA AWAD [[id number]]   Attending Physician: NIA AWAD [[id number]]   Bed Request Comments: 3g/h

## 2025-06-03 ENCOUNTER — APPOINTMENT (OUTPATIENT)
Dept: MRI IMAGING | Facility: HOSPITAL | Age: 65
End: 2025-06-03
Payer: MEDICARE

## 2025-06-03 VITALS
SYSTOLIC BLOOD PRESSURE: 117 MMHG | HEIGHT: 70 IN | HEART RATE: 106 BPM | OXYGEN SATURATION: 94 % | WEIGHT: 250 LBS | TEMPERATURE: 99.4 F | RESPIRATION RATE: 18 BRPM | DIASTOLIC BLOOD PRESSURE: 74 MMHG | BODY MASS INDEX: 35.79 KG/M2

## 2025-06-03 LAB
ANION GAP SERPL CALCULATED.3IONS-SCNC: 7 MMOL/L (ref 5–15)
BASOPHILS # BLD AUTO: 0.03 10*3/MM3 (ref 0–0.2)
BASOPHILS NFR BLD AUTO: 0.3 % (ref 0–1.5)
BUN SERPL-MCNC: 8.2 MG/DL (ref 8–23)
BUN/CREAT SERPL: 10.9 (ref 7–25)
CALCIUM SPEC-SCNC: 7.7 MG/DL (ref 8.6–10.5)
CHLORIDE SERPL-SCNC: 104 MMOL/L (ref 98–107)
CO2 SERPL-SCNC: 24 MMOL/L (ref 22–29)
CREAT SERPL-MCNC: 0.75 MG/DL (ref 0.76–1.27)
DEPRECATED RDW RBC AUTO: 47.3 FL (ref 37–54)
EGFRCR SERPLBLD CKD-EPI 2021: 99.5 ML/MIN/1.73
EOSINOPHIL # BLD AUTO: 0.05 10*3/MM3 (ref 0–0.4)
EOSINOPHIL NFR BLD AUTO: 0.4 % (ref 0.3–6.2)
ERYTHROCYTE [DISTWIDTH] IN BLOOD BY AUTOMATED COUNT: 13.7 % (ref 12.3–15.4)
GLUCOSE BLDC GLUCOMTR-MCNC: 102 MG/DL (ref 70–130)
GLUCOSE BLDC GLUCOMTR-MCNC: 105 MG/DL (ref 70–130)
GLUCOSE BLDC GLUCOMTR-MCNC: 152 MG/DL (ref 70–130)
GLUCOSE BLDC GLUCOMTR-MCNC: 204 MG/DL (ref 70–130)
GLUCOSE SERPL-MCNC: 136 MG/DL (ref 65–99)
HBA1C MFR BLD: 5.9 % (ref 4.8–5.6)
HCT VFR BLD AUTO: 41.8 % (ref 37.5–51)
HGB BLD-MCNC: 13.5 G/DL (ref 13–17.7)
IMM GRANULOCYTES # BLD AUTO: 0.04 10*3/MM3 (ref 0–0.05)
IMM GRANULOCYTES NFR BLD AUTO: 0.3 % (ref 0–0.5)
LYMPHOCYTES # BLD AUTO: 1.72 10*3/MM3 (ref 0.7–3.1)
LYMPHOCYTES NFR BLD AUTO: 14.8 % (ref 19.6–45.3)
MCH RBC QN AUTO: 30.2 PG (ref 26.6–33)
MCHC RBC AUTO-ENTMCNC: 32.3 G/DL (ref 31.5–35.7)
MCV RBC AUTO: 93.5 FL (ref 79–97)
MONOCYTES # BLD AUTO: 1.11 10*3/MM3 (ref 0.1–0.9)
MONOCYTES NFR BLD AUTO: 9.5 % (ref 5–12)
MRSA DNA SPEC QL NAA+PROBE: NEGATIVE
NEUTROPHILS NFR BLD AUTO: 74.7 % (ref 42.7–76)
NEUTROPHILS NFR BLD AUTO: 8.71 10*3/MM3 (ref 1.7–7)
NRBC BLD AUTO-RTO: 0 /100 WBC (ref 0–0.2)
PLATELET # BLD AUTO: 152 10*3/MM3 (ref 140–450)
PMV BLD AUTO: 10.9 FL (ref 6–12)
POTASSIUM SERPL-SCNC: 3.5 MMOL/L (ref 3.5–5.2)
POTASSIUM SERPL-SCNC: 4.2 MMOL/L (ref 3.5–5.2)
QT INTERVAL: 370 MS
QTC INTERVAL: 491 MS
RBC # BLD AUTO: 4.47 10*6/MM3 (ref 4.14–5.8)
SODIUM SERPL-SCNC: 135 MMOL/L (ref 136–145)
WBC NRBC COR # BLD AUTO: 11.66 10*3/MM3 (ref 3.4–10.8)

## 2025-06-03 PROCEDURE — 97110 THERAPEUTIC EXERCISES: CPT

## 2025-06-03 PROCEDURE — 96376 TX/PRO/DX INJ SAME DRUG ADON: CPT

## 2025-06-03 PROCEDURE — G0378 HOSPITAL OBSERVATION PER HR: HCPCS

## 2025-06-03 PROCEDURE — 85025 COMPLETE CBC W/AUTO DIFF WBC: CPT | Performed by: INTERNAL MEDICINE

## 2025-06-03 PROCEDURE — 25810000003 SODIUM CHLORIDE 0.9 % SOLUTION: Performed by: INTERNAL MEDICINE

## 2025-06-03 PROCEDURE — 25810000003 SODIUM CHLORIDE 0.9 % SOLUTION 250 ML FLEX CONT

## 2025-06-03 PROCEDURE — 96375 TX/PRO/DX INJ NEW DRUG ADDON: CPT

## 2025-06-03 PROCEDURE — 73223 MRI JOINT UPR EXTR W/O&W/DYE: CPT

## 2025-06-03 PROCEDURE — 97166 OT EVAL MOD COMPLEX 45 MIN: CPT

## 2025-06-03 PROCEDURE — 93005 ELECTROCARDIOGRAM TRACING: CPT | Performed by: INTERNAL MEDICINE

## 2025-06-03 PROCEDURE — 25010000002 VANCOMYCIN HCL 1.25 G RECONSTITUTED SOLUTION 1 EACH VIAL

## 2025-06-03 PROCEDURE — 63710000001 INSULIN LISPRO (HUMAN) PER 5 UNITS: Performed by: INTERNAL MEDICINE

## 2025-06-03 PROCEDURE — 25510000002 GADOBENATE DIMEGLUMINE 529 MG/ML SOLUTION: Performed by: INTERNAL MEDICINE

## 2025-06-03 PROCEDURE — 25010000002 FENTANYL CITRATE (PF) 50 MCG/ML SOLUTION: Performed by: EMERGENCY MEDICINE

## 2025-06-03 PROCEDURE — 96367 TX/PROPH/DG ADDL SEQ IV INF: CPT

## 2025-06-03 PROCEDURE — 25010000002 HYDROMORPHONE PER 4 MG: Performed by: INTERNAL MEDICINE

## 2025-06-03 PROCEDURE — 84132 ASSAY OF SERUM POTASSIUM: CPT | Performed by: STUDENT IN AN ORGANIZED HEALTH CARE EDUCATION/TRAINING PROGRAM

## 2025-06-03 PROCEDURE — 80048 BASIC METABOLIC PNL TOTAL CA: CPT | Performed by: INTERNAL MEDICINE

## 2025-06-03 PROCEDURE — A9577 INJ MULTIHANCE: HCPCS | Performed by: INTERNAL MEDICINE

## 2025-06-03 PROCEDURE — 82948 REAGENT STRIP/BLOOD GLUCOSE: CPT

## 2025-06-03 PROCEDURE — 97162 PT EVAL MOD COMPLEX 30 MIN: CPT

## 2025-06-03 RX ORDER — POTASSIUM CHLORIDE 1500 MG/1
40 TABLET, EXTENDED RELEASE ORAL EVERY 4 HOURS
Status: COMPLETED | OUTPATIENT
Start: 2025-06-03 | End: 2025-06-03

## 2025-06-03 RX ORDER — FAMOTIDINE 10 MG/ML
20 INJECTION, SOLUTION INTRAVENOUS ONCE
OUTPATIENT
Start: 2025-06-03 | End: 2025-06-03

## 2025-06-03 RX ORDER — SODIUM CHLORIDE 9 MG/ML
50 INJECTION, SOLUTION INTRAVENOUS CONTINUOUS
Status: ACTIVE | OUTPATIENT
Start: 2025-06-03 | End: 2025-06-03

## 2025-06-03 RX ORDER — AMOXICILLIN 250 MG
2 CAPSULE ORAL 2 TIMES DAILY PRN
Status: DISCONTINUED | OUTPATIENT
Start: 2025-06-03 | End: 2025-06-04 | Stop reason: HOSPADM

## 2025-06-03 RX ORDER — FAMOTIDINE 20 MG/1
20 TABLET, FILM COATED ORAL ONCE
OUTPATIENT
Start: 2025-06-03 | End: 2025-06-03

## 2025-06-03 RX ORDER — SODIUM CHLORIDE 0.9 % (FLUSH) 0.9 %
10 SYRINGE (ML) INJECTION AS NEEDED
Status: DISCONTINUED | OUTPATIENT
Start: 2025-06-03 | End: 2025-06-04 | Stop reason: HOSPADM

## 2025-06-03 RX ORDER — POLYETHYLENE GLYCOL 3350 17 G/17G
17 POWDER, FOR SOLUTION ORAL DAILY PRN
Status: DISCONTINUED | OUTPATIENT
Start: 2025-06-03 | End: 2025-06-04 | Stop reason: HOSPADM

## 2025-06-03 RX ORDER — MIDAZOLAM HYDROCHLORIDE 1 MG/ML
0.5 INJECTION, SOLUTION INTRAMUSCULAR; INTRAVENOUS
OUTPATIENT
Start: 2025-06-03

## 2025-06-03 RX ORDER — DEXTROSE MONOHYDRATE 25 G/50ML
25 INJECTION, SOLUTION INTRAVENOUS
Status: DISCONTINUED | OUTPATIENT
Start: 2025-06-03 | End: 2025-06-04 | Stop reason: HOSPADM

## 2025-06-03 RX ORDER — INSULIN LISPRO 100 [IU]/ML
2-7 INJECTION, SOLUTION INTRAVENOUS; SUBCUTANEOUS
Status: DISCONTINUED | OUTPATIENT
Start: 2025-06-03 | End: 2025-06-04 | Stop reason: HOSPADM

## 2025-06-03 RX ORDER — SODIUM CHLORIDE, SODIUM LACTATE, POTASSIUM CHLORIDE, CALCIUM CHLORIDE 600; 310; 30; 20 MG/100ML; MG/100ML; MG/100ML; MG/100ML
9 INJECTION, SOLUTION INTRAVENOUS CONTINUOUS
OUTPATIENT
Start: 2025-06-04 | End: 2025-06-04

## 2025-06-03 RX ORDER — SODIUM CHLORIDE 0.9 % (FLUSH) 0.9 %
10 SYRINGE (ML) INJECTION EVERY 12 HOURS SCHEDULED
OUTPATIENT
Start: 2025-06-03

## 2025-06-03 RX ORDER — SODIUM CHLORIDE 0.9 % (FLUSH) 0.9 %
10 SYRINGE (ML) INJECTION AS NEEDED
OUTPATIENT
Start: 2025-06-03

## 2025-06-03 RX ORDER — SODIUM CHLORIDE 9 MG/ML
40 INJECTION, SOLUTION INTRAVENOUS AS NEEDED
Status: DISCONTINUED | OUTPATIENT
Start: 2025-06-03 | End: 2025-06-04 | Stop reason: HOSPADM

## 2025-06-03 RX ORDER — LIDOCAINE HYDROCHLORIDE 10 MG/ML
0.5 INJECTION, SOLUTION EPIDURAL; INFILTRATION; INTRACAUDAL; PERINEURAL ONCE AS NEEDED
OUTPATIENT
Start: 2025-06-03

## 2025-06-03 RX ORDER — BISACODYL 10 MG
10 SUPPOSITORY, RECTAL RECTAL DAILY PRN
Status: DISCONTINUED | OUTPATIENT
Start: 2025-06-03 | End: 2025-06-04 | Stop reason: HOSPADM

## 2025-06-03 RX ORDER — TRAZODONE HYDROCHLORIDE 50 MG/1
50 TABLET ORAL NIGHTLY
Status: DISCONTINUED | OUTPATIENT
Start: 2025-06-03 | End: 2025-06-04 | Stop reason: HOSPADM

## 2025-06-03 RX ORDER — BISACODYL 5 MG/1
5 TABLET, DELAYED RELEASE ORAL DAILY PRN
Status: DISCONTINUED | OUTPATIENT
Start: 2025-06-03 | End: 2025-06-04 | Stop reason: HOSPADM

## 2025-06-03 RX ORDER — NICOTINE POLACRILEX 4 MG
15 LOZENGE BUCCAL
Status: DISCONTINUED | OUTPATIENT
Start: 2025-06-03 | End: 2025-06-04 | Stop reason: HOSPADM

## 2025-06-03 RX ORDER — ONDANSETRON 2 MG/ML
4 INJECTION INTRAMUSCULAR; INTRAVENOUS EVERY 6 HOURS PRN
Status: DISCONTINUED | OUTPATIENT
Start: 2025-06-03 | End: 2025-06-04 | Stop reason: HOSPADM

## 2025-06-03 RX ORDER — HYDROMORPHONE HYDROCHLORIDE 1 MG/ML
0.5 INJECTION, SOLUTION INTRAMUSCULAR; INTRAVENOUS; SUBCUTANEOUS
Status: DISCONTINUED | OUTPATIENT
Start: 2025-06-03 | End: 2025-06-03

## 2025-06-03 RX ORDER — HYDROCODONE BITARTRATE AND ACETAMINOPHEN 5; 325 MG/1; MG/1
1 TABLET ORAL EVERY 6 HOURS PRN
Status: DISCONTINUED | OUTPATIENT
Start: 2025-06-03 | End: 2025-06-04 | Stop reason: HOSPADM

## 2025-06-03 RX ORDER — FENTANYL CITRATE 50 UG/ML
50 INJECTION, SOLUTION INTRAMUSCULAR; INTRAVENOUS ONCE
Refills: 0 | Status: COMPLETED | OUTPATIENT
Start: 2025-06-03 | End: 2025-06-03

## 2025-06-03 RX ORDER — IBUPROFEN 600 MG/1
1 TABLET ORAL
Status: DISCONTINUED | OUTPATIENT
Start: 2025-06-03 | End: 2025-06-04 | Stop reason: HOSPADM

## 2025-06-03 RX ORDER — SODIUM CHLORIDE 0.9 % (FLUSH) 0.9 %
10 SYRINGE (ML) INJECTION EVERY 12 HOURS SCHEDULED
Status: DISCONTINUED | OUTPATIENT
Start: 2025-06-03 | End: 2025-06-04 | Stop reason: HOSPADM

## 2025-06-03 RX ORDER — ATORVASTATIN CALCIUM 20 MG/1
20 TABLET, FILM COATED ORAL NIGHTLY
Status: DISCONTINUED | OUTPATIENT
Start: 2025-06-03 | End: 2025-06-04 | Stop reason: HOSPADM

## 2025-06-03 RX ADMIN — POTASSIUM CHLORIDE 40 MEQ: 1500 TABLET, EXTENDED RELEASE ORAL at 11:04

## 2025-06-03 RX ADMIN — VANCOMYCIN HYDROCHLORIDE 1250 MG: 1.25 INJECTION, POWDER, LYOPHILIZED, FOR SOLUTION INTRAVENOUS at 11:07

## 2025-06-03 RX ADMIN — POTASSIUM CHLORIDE 40 MEQ: 1500 TABLET, EXTENDED RELEASE ORAL at 15:40

## 2025-06-03 RX ADMIN — Medication 10 ML: at 01:15

## 2025-06-03 RX ADMIN — HYDROCODONE BITARTRATE AND ACETAMINOPHEN 1 TABLET: 5; 325 TABLET ORAL at 18:32

## 2025-06-03 RX ADMIN — SODIUM CHLORIDE 50 ML/HR: 9 INJECTION, SOLUTION INTRAVENOUS at 01:25

## 2025-06-03 RX ADMIN — HYDROMORPHONE HYDROCHLORIDE 0.5 MG: 1 INJECTION, SOLUTION INTRAMUSCULAR; INTRAVENOUS; SUBCUTANEOUS at 06:31

## 2025-06-03 RX ADMIN — FENTANYL CITRATE 50 MCG: 50 INJECTION, SOLUTION INTRAMUSCULAR; INTRAVENOUS at 00:35

## 2025-06-03 RX ADMIN — GADOBENATE DIMEGLUMINE 20 ML: 529 INJECTION, SOLUTION INTRAVENOUS at 11:00

## 2025-06-03 RX ADMIN — HYDROMORPHONE HYDROCHLORIDE 0.5 MG: 1 INJECTION, SOLUTION INTRAMUSCULAR; INTRAVENOUS; SUBCUTANEOUS at 09:33

## 2025-06-03 RX ADMIN — HYDROCODONE BITARTRATE AND ACETAMINOPHEN 1 TABLET: 5; 325 TABLET ORAL at 12:00

## 2025-06-03 RX ADMIN — INSULIN LISPRO 2 UNITS: 100 INJECTION, SOLUTION INTRAVENOUS; SUBCUTANEOUS at 09:34

## 2025-06-03 RX ADMIN — TRAZODONE HYDROCHLORIDE 50 MG: 50 TABLET ORAL at 01:20

## 2025-06-03 NOTE — PLAN OF CARE
Goal Outcome Evaluation:  Plan of Care Reviewed With: patient           Outcome Evaluation: PT eval completed. Pt presents below baseline function d/t increased RUE pain, mild balance deficits, and decreased activity tolerance. Pt ambulated in room w/ UE support on IV pole, CGA. Mobility limited by RUE pain this date. Pt would benefit from IP PT services while hospitalized. Anticipate pt to d/c home w/ assist once medically appropriate. He may also benefit from additional OP PT once discharged.    Anticipated Discharge Disposition (PT): home with assist, home with outpatient therapy services

## 2025-06-03 NOTE — PLAN OF CARE
Goal Outcome Evaluation:  Plan of Care Reviewed With: patient        Progress: no change (OT IE)  Outcome Evaluation: OT evaluation completed. Pt presents with decreased I in ADLs, related t/fs and mobility compared to PLOF limited by pain and edema at RUE which impacted all fxl regard and mobility, decreased activity tolerance, impaired balance, muscle weakness at BUEs and fatigue with more dynamic demands. Pt req'd mod to dep A in UB and LBD, respectively impacted by inability to readily use dominant RUE or grasp objects with BUE integration or tolerate touch to impacated RUE. Pt req'd CGA for bed mobility and STS t/fs and in room ADL related mobility with initial use of IV pole for support and later no AD. Pt tolerated minimal ROM at RUE. Pt would benefit from IPOT POC and home w/ A and OP OT at d/c once pain managed.    Anticipated Discharge Disposition (OT): home with assist, home with outpatient therapy services

## 2025-06-03 NOTE — THERAPY EVALUATION
Patient Name: Alejo Condon  : 1960    MRN: [id number]                              Today's Date: 6/3/2025       Admit Date: 2025    Visit Dx:     ICD-10-CM ICD-9-CM   1. Cellulitis of right upper extremity  L03.113 682.3   2. Olecranon bursitis of right elbow  M70.21 726.33     Patient Active Problem List   Diagnosis    Mixed hyperlipidemia    Acute systolic CHF (congestive heart failure)    Alcohol abuse    Age-related nuclear cataract of right eye    Benign essential hypertension    Diverticulitis    Erectile dysfunction    Irritable bowel syndrome (IBS)    Lumbar degenerative disc disease    Posterior tibial tendinitis of right lower extremity    Pseudophakia, right eye    Epiretinal membrane, right eye    Serous choroidal detachment of right eye    H/O vitrectomy    Other specified abdominal hernia without obstruction or gangrene    Anxiety    Former smoker (Stopped 10/2023)    Type 2 diabetes mellitus with hyperglycemia    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 Left ventricular diastolic dysfunction is noted (grade III w/high LAP) consistent with reversible restrictive pattern Left atrial volume is moderately increased.    Diverticulitis     Elevated cholesterol     Hyperlipidemia     Hypertension     Opiate addiction     Resolved since on suboxone    R/o Asthma 10/29/2024    R/O FELIPA (obstructive sleep apnea) 2024     Past Surgical History:   Procedure Laterality Date    ANAL FISTULA REPAIR N/A     CARDIAC CATHETERIZATION N/A 2020    Procedure: Left Heart Cath;   Surgeon: Garrett Trejo MD;  Location:  [facility];  Service: Cardiovascular;  Laterality: N/A;    COLONOSCOPY      COLONOSCOPY N/A 9/12/2023    Procedure: COLONOSCOPY FOR SCREENING;  Surgeon: Crystal Rodriguez MD;  Location:  [facility];  Service: Gastroenterology;  Laterality: N/A;    ENDOSCOPY      ENDOSCOPY N/A 9/12/2023    Procedure: ESOPHAGOGASTRODUODENOSCOPY WITH BIOPSY;  Surgeon: Crystal Rodriguez MD;  Location:  [facility];  Service: Gastroenterology;  Laterality: N/A;    FINGER SURGERY      INTERVENTIONAL RADIOLOGY PROCEDURE N/A 8/15/2024    Procedure: Pulmonary Angiogram and Embolization;  Surgeon: Jean Paul Garcia MD;  Location:  [facility];  Service: Interventional Radiology;  Laterality: N/A;    REFRACTIVE SURGERY      RETINAL DETACHMENT REPAIR  07/2021      General Information       Row Name 06/03/25 1336          OT Time and Intention    Document Type evaluation  -JY     Mode of Treatment occupational therapy  -JY       Row Name 06/03/25 4701          General Information    Patient Profile Reviewed yes  -JY     Prior Level of Function independent:;all household mobility;community mobility;gait;transfer;bed mobility;ADL's;feeding;grooming;dressing;bathing;driving;home management;cooking;cleaning  pt reports I in all ADLs, related t/fs and mobility w/o use of AD (has wx, cane), endorses house mgmt, cleaning, laundry occur less often yet pt I in all; endorses 1 acute fall  -JY     Existing Precautions/Restrictions fall;other (see comments)  RUE cellulitis w/ ortho orders for WBAT RUE, encourage hand, wrist, digit ROM and elevation for edema  -JY     Barriers to Rehab medically complex  -JY       Row Name 06/03/25 5325          Occupational Profile    Environmental Supports and Barriers (Occupational Profile) step over tub/shower w/ seat, elevated toilet; DME: no AD used yet has access to wx, cane if needed  -JY     Patient Goals (Occupational Profile)  "to return to PLOF  -JY       Row Name 06/03/25 1336          Living Environment    Current Living Arrangements home  -JY     People in Home friend(s)  reports \"friend\" at home that can assist as needed  -JY       Row Name 06/03/25 1336          Home Main Entrance    Number of Stairs, Main Entrance two  -JY     Stair Railings, Main Entrance none  -JY       Row Name 06/03/25 1336          Stairs Within Home, Primary    Stairs, Within Home, Primary flight to basement area with laundry room  -JY     Number of Stairs, Within Home, Primary other (see comments)  14  -JY     Stair Railings, Within Home, Primary railing on left side (ascending)  -JY       Row Name 06/03/25 1336          Cognition    Orientation Status (Cognition) oriented x 3  -JY       Row Name 06/03/25 1336          Safety Issues/Impairments Affecting Functional Mobility    Safety Issues Affecting Function (Mobility) awareness of need for assistance;insight into deficits/self-awareness;safety precaution awareness  -JY     Impairments Affecting Function (Mobility) balance;endurance/activity tolerance;pain;range of motion (ROM);strength  -JY     Comment, Safety Issues/Impairments (Mobility) pt alert and able to follow commands; most limited by pain and edema at RUE  -JY               User Key  (r) = Recorded By, (t) = Taken By, (c) = Cosigned By      Initials Name Provider Type    Leslee Kerr OT Occupational Therapist                     Mobility/ADL's       Row Name 06/03/25 1348          Bed Mobility    Bed Mobility supine-sit;scooting/bridging  -JY     Scooting/Bridging [place] (Bed Mobility) contact guard;verbal cues  -JY     Supine-Sit [place] (Bed Mobility) contact guard;verbal cues  -JY     Bed Mobility, Safety Issues decreased use of arms for pushing/pulling  -JY     Assistive Device (Bed Mobility) bed rails;head of bed elevated  -JY     Comment, (Bed Mobility) skilled cues for optimal hand placement and seq specifically to advance " LEs to EOB, pt limited in reach across midline to grasp bedrail for aid in pulling self forward and upward due to RUE deficits and exiting bed at L side; pt req'd increased time to manage RUE along with transition attempting to maintain support to decrease exacerbated pain wiht movement;  cues to advance hips forward to achieve symmetry for stability; orthostatics obtained w/o significant change in BP  -SHIV       Row Name 06/03/25 1348          Transfers    Transfers sit-stand transfer;stand-sit transfer  -JY     Comment, (Transfers) cues for optimal hand placement for controlled ascend, descend specifically to push up from seated surface, to reach back prior to sitting once aligned and in close proximity to seated surface; pt used RUE minimally using more of core and LE strength; no significant change in BP upon standing too  -SHIV       Row Name 06/03/25 1348          Sit-Stand Transfer    Sit-Stand [place] (Transfers) contact guard;verbal cues  -JY     Assistive Device (Sit-Stand Transfers) other (see comments)  no AD used  -[employer]       Row Name 06/03/25 1348          Stand-Sit Transfer    Stand-Sit [place] (Transfers) contact guard;verbal cues  -JY     Assistive Device (Stand-Sit Transfers) other (see comments)  no AD used  -JY       Row Name 06/03/25 1348          Functional Mobility    Functional Mobility- Ind. Level contact guard assist;verbal cues required;other (see comments)  with initial use of IV pole for support  -JY     Functional Mobility- Device other (see comments)  no AD used generally for mobility, held to IV pole for support initially  -JY     Functional Mobility-Distance (Feet) --  in room ADL related mobility  -JY     Functional Mobility- Comment defer to PT for specifics however during in room ADL related mobility pt req'd gross CGA with initial use of IV pole to hold to, later no AD used; pt very cautious with RUE to avoid unnecessary contact with objects to reduce injury or increased  pain, kept RUE guarded and LUE/hand clasped R while mobilizing  -     Patient was able to Ambulate yes  -       Row Name 06/03/25 1348          Activities of Daily Living    BADL Assessment/Intervention upper body dressing;lower body dressing;grooming  -[facility] Name 06/03/25 1348          Mobility    Extremity Weight-bearing Status right upper extremity  -     Right Upper Extremity (Weight-bearing Status) weight-bearing as tolerated (WBAT)  -[facility] Name 06/03/25 1348          Upper Body Dressing Assessment/Training    [place] Level (Upper Body Dressing) doff;don;pajama/robe;moderate assist (50% patient effort)  -     Position (Upper Body Dressing) edge of bed sitting  -     Comment, (Upper Body Dressing) able to thread LUE through modified sleeve during d/d yet not at LUE, too impacted by pain and edema, req'd gross mod A  -[facility] Name 06/03/25 1348          Lower Body Dressing Assessment/Training    [place] Level (Lower Body Dressing) doff;don;dependent (less than 25% patient effort)  -     Position (Lower Body Dressing) unsupported sitting  -     Comment, (Lower Body Dressing) u/a to reach distally to feet or demo BUE integration to pull socks on/off  -[facility] Name 06/03/25 1348          Grooming Assessment/Training    [place] Level (Grooming) wash face, hands;set up;standby assist  -     Position (Grooming) supported sitting  -     Comment, (Grooming) used non dominant hand/UE to grasp cloth and wash face/hands, limited touch to RUE d/t pain and edema  -               User Key  (r) = Recorded By, (t) = Taken By, (c) = Cosigned By      Initials Name Provider Type    Leslee Kerr OT Occupational Therapist                   Obj/Interventions       Row Name 06/03/25 1402          Sensory Assessment (Somatosensory)    Sensory Assessment denies any numbness or tingling and able to recognize LT stimuli at BUEs, did endorse sensory awareness at RUE  "\"different\" compared to LUE  -STEVEN       Row Name 06/03/25 1402          Vision Assessment/Intervention    Vision Assessment Comment no acute changes to vision  -Y       Row Name 06/03/25 1402          Range of Motion Comprehensive    General Range of Motion upper extremity range of motion deficits identified  -SHIV     Comment, General Range of Motion LUE AROM WFL, RUE able to demo all planes of movement however guarded and decreased ROM d/t pain  -JY       Row Name 06/03/25 1402          Strength Comprehensive (MMT)    General Manual Muscle Testing (MMT) Assessment upper extremity strength deficits identified  -SHIV     Comment, General Manual Muscle Testing (MMT) Assessment MMT not completed at RUE d/t pain and edema, 4/5 LUE MMS  -JY       Row Name 06/03/25 1402          Motor Skills    Motor Skills functional endurance  -JY     Functional Endurance decreased activity tolerance toward more dynamic demands  -JSTEVEN       Row Name 06/03/25 1402          Balance    Balance Assessment sitting static balance;sitting dynamic balance;standing static balance;standing dynamic balance  -JY     Static Sitting Balance supervision  -JY     Dynamic Sitting Balance standby assist  -JY     Position, Sitting Balance unsupported;sitting edge of bed  -JY     Static Standing Balance contact guard  -JY     Dynamic Standing Balance contact guard;verbal cues  -JY     Position/Device Used, Standing Balance supported  -JY     Balance Interventions sitting;standing;static;dynamic;sit to stand;supported;occupation based/functional task  -JY     Comment, Balance no overt LOB during seated or standing tasks, initially held to IV pole at LUE for support, later only CGA provided  -JY               User Key  (r) = Recorded By, (t) = Taken By, (c) = Cosigned By      Initials Name Provider Type    Leslee Kerr, OT Occupational Therapist                   Goals/Plan       Row Name 06/03/25 1422          Transfer Goal 1 (OT)    Activity/Assistive " Device (Transfer Goal 1, OT) sit-to-stand/stand-to-sit;bed-to-chair/chair-to-bed;toilet;commode;commode, bedside without drop arms  -JY     [place] Level/Cues Needed (Transfer Goal 1, OT) contact guard required;standby assist;verbal cues required  -JY     Time Frame (Transfer Goal 1, OT) long term goal (LTG);5 days  -JY     Progress/Outcome (Transfer Goal 1, OT) new goal  -[employer]       Row Name 06/03/25 1422          Dressing Goal 1 (OT)    Activity/Device (Dressing Goal 1, OT) upper body dressing  -JY     [place]/Cues Needed (Dressing Goal 1, OT) minimum assist (75% or more patient effort);verbal cues required  -JY     Time Frame (Dressing Goal 1, OT) long term goal (LTG);5 days  -JY     Progress/Outcome (Dressing Goal 1, OT) new goal  -SHIV       Row Name 06/03/25 1422          Toileting Goal 1 (OT)    Activity/Device (Toileting Goal 1, OT) adjust/manage clothing;perform perineal hygiene;commode;commode, bedside without drop arms;grab bar/safety frame;raised toilet seat  -JY     [place] Level/Cues Needed (Toileting Goal 1, OT) minimum assist (75% or more patient effort);verbal cues required  -JY     Time Frame (Toileting Goal 1, OT) long term goal (LTG);5 days  -JY     Progress/Outcome (Toileting Goal 1, OT) new goal  -[employer]       Row Name 06/03/25 1422          ROM Goal 1 (OT)    ROM Goal 1 (OT) Pt to complete RUE AROM as tolerated to promote joint mobility and edema mgmt as ordered to allow more optimal use of dominant RUE  -JY     Time Frame (ROM Goal 1, OT) short term goal (STG);3 days  -JY     Progress/Outcome (ROM Goal 1, OT) new goal  -[employer]       Row Name 06/03/25 1422          Strength Goal 1 (OT)    Strength Goal 1 (OT) Pt to complete seated HEP encompassing LUE targeting strength and endurance with progressive sets/reps/resistance in order to improve integration in ADLs, related t/fs and mobility as appropriate  -JY     Time Frame (Strength Goal 1, OT) short term goal (STG);3 days  CRISTIAN      Progress/Outcome (Strength Goal 1, OT) new goal  -JY       Row Name 06/03/25 1422          Therapy Assessment/Plan (OT)    Planned Therapy Interventions (OT) activity tolerance training;adaptive equipment training;BADL retraining;functional balance retraining;neuromuscular control/coordination retraining;occupation/activity based interventions;patient/caregiver education/training;ROM/therapeutic exercise;strengthening exercise;transfer/mobility retraining  -JY               User Key  (r) = Recorded By, (t) = Taken By, (c) = Cosigned By      Initials Name Provider Type    Leslee Kerr, GENEVA Occupational Therapist                   Clinical Impression       Row Name 06/03/25 1414          Pain Assessment    Pretreatment Pain Rating 10/10  -JY     Posttreatment Pain Rating 10/10  -JY     Pain Location extremity  -JY     Pain Side/Orientation right;upper  -JY     Pain Management Interventions activity modification encouraged;exercise or physical activity utilized;positioning techniques utilized;nursing notified  -JY     Response to Pain Interventions activity participation with increased pain  -JY     Pre/Posttreatment Pain Comment pt reported pain past end point of 10/10 and instead 12/10 and 14/10 with movement, RN aware and addressing  -JY       Row Name 06/03/25 1414          Plan of Care Review    Plan of Care Reviewed With patient  -PATRICIAY     Progress no change  OT IE  -JY     Outcome Evaluation OT evaluation completed. Pt presents with decreased I in ADLs, related t/fs and mobility compared to PLOF limited by pain and edema at RUE which impacted all fxl regard and mobility, decreased activity tolerance, impaired balance, muscle weakness at BUEs and fatigue with more dynamic demands. Pt req'd mod to dep A in UB and LBD, respectively impacted by inability to readily use dominant RUE or grasp objects with BUE integration or tolerate touch to impacated RUE. Pt req'd CGA for bed mobility and STS t/fs and in room ADL  related mobility with initial use of IV pole for support and later no AD. Pt tolerated minimal ROM at RUE. Pt would benefit from IPOT POC and home w/ A and OP OT at d/c once pain managed.  -SHIV       Row Name 06/03/25 1414          Therapy Assessment/Plan (OT)    Patient/Family Therapy Goal Statement (OT) to return to PLOF  -JY     Rehab Potential (OT) good  -JY     Criteria for Skilled Therapeutic Interventions Met (OT) yes  -JY     Therapy Frequency (OT) daily  -JY     Predicted Duration of Therapy Intervention (OT) 5 days  -JY       Row Name 06/03/25 1414          Therapy Plan Review/Discharge Plan (OT)    Anticipated Discharge Disposition (OT) home with assist;home with outpatient therapy services  -SHIV       Row Name 06/03/25 1414          Vital Signs    Pre Systolic BP Rehab 133  -JY     Pre Treatment Diastolic BP 91  -JY     Intra Systolic BP Rehab 136  -JY     Intra Treatment Diastolic BP 90  -JY     Post Systolic BP Rehab 127  -JY     Post Treatment Diastolic BP 78  -JY     Pretreatment Heart Rate (beats/min) 109  -JY     Intratreatment Heart Rate (beats/min) 113  -JY     Posttreatment Heart Rate (beats/min) 112  -JY     O2 Delivery Pre Treatment room air  -JY     O2 Delivery Intra Treatment room air  -JY     O2 Delivery Post Treatment room air  -JY     Pre Patient Position Supine  -JY     Intra Patient Position Sitting  -JY     Post Patient Position Standing  -JY       Row Name 06/03/25 1414          Positioning and Restraints    Pre-Treatment Position in bed  -JY     Post Treatment Position chair  -JY     In Chair notified nsg;reclined;call light within reach;encouraged to call for assist;exit alarm on;RUE elevated;waffle cushion;legs elevated  -JY               User Key  (r) = Recorded By, (t) = Taken By, (c) = Cosigned By      Initials Name Provider Type    Leslee Kerr, OT Occupational Therapist                   Outcome Measures       Row Name 06/03/25 1425          How much help from another is  currently needed...    Putting on and taking off regular lower body clothing? 1  -JY     Bathing (including washing, rinsing, and drying) 2  -JY     Toileting (which includes using toilet bed pan or urinal) 2  -JY     Putting on and taking off regular upper body clothing 2  -JY     Taking care of personal grooming (such as brushing teeth) 3  -JY     Eating meals 3  -JY     AM-PAC 6 Clicks Score (OT) 13  -JY       Row Name 06/03/25 1316          How much help from another person do you currently need...    Turning from your back to your side while in flat bed without using bedrails? 3  -LH     Moving from lying on back to sitting on the side of a flat bed without bedrails? 3  -LH     Moving to and from a bed to a chair (including a wheelchair)? 3  -LH     Standing up from a chair using your arms (e.g., wheelchair, bedside chair)? 3  -LH     Climbing 3-5 steps with a railing? 3  -LH     To walk in hospital room? 3  -LH     AM-PAC 6 Clicks Score (PT) 18  -LH     Highest Level of Mobility Goal Walk 10 Steps or More-6  -LH       Row Name 06/03/25 1425 06/03/25 1316       Functional Assessment    Outcome Measure Options AM-PAC 6 Clicks Daily Activity (OT)  - AM-PAC 6 Clicks Basic Mobility (PT)  -              User Key  (r) = Recorded By, (t) = Taken By, (c) = Cosigned By      Initials Name Provider Type    Leslee Kerr OT Occupational Therapist     Leonela Hernandez, PT Physical Therapist                    Occupational Therapy Education       Title: PT OT SLP Therapies (In Progress)       Topic: Occupational Therapy (In Progress)       Point: ADL training (In Progress)       Learning Progress Summary            Patient Acceptance, E,D, NR by SHIV at 6/3/2025 1108                      Point: Home exercise program (In Progress)       Learning Progress Summary            Patient Acceptance, E,D, NR by SHIV at 6/3/2025 1108                      Point: Precautions (In Progress)       Learning Progress Summary             Patient Acceptance, E,D, NR by SHIV at 6/3/2025 1108                      Point: Body mechanics (In Progress)       Learning Progress Summary            Patient Acceptance, E,D, NR by SHIV at 6/3/2025 1108                                      User Key       Initials Effective Dates Name Provider Type Discipline    SHIV 06/16/21 -  Leslee Mejia, GENEVA Occupational Therapist OT                  OT Recommendation and Plan  Planned Therapy Interventions (OT): activity tolerance training, adaptive equipment training, BADL retraining, functional balance retraining, neuromuscular control/coordination retraining, occupation/activity based interventions, patient/caregiver education/training, ROM/therapeutic exercise, strengthening exercise, transfer/mobility retraining  Therapy Frequency (OT): daily  Plan of Care Review  Plan of Care Reviewed With: patient  Progress: no change (OT IE)  Outcome Evaluation: OT evaluation completed. Pt presents with decreased I in ADLs, related t/fs and mobility compared to PLOF limited by pain and edema at RUE which impacted all fxl regard and mobility, decreased activity tolerance, impaired balance, muscle weakness at BUEs and fatigue with more dynamic demands. Pt req'd mod to dep A in UB and LBD, respectively impacted by inability to readily use dominant RUE or grasp objects with BUE integration or tolerate touch to impacated RUE. Pt req'd CGA for bed mobility and STS t/fs and in room ADL related mobility with initial use of IV pole for support and later no AD. Pt tolerated minimal ROM at RUE. Pt would benefit from IPOT POC and home w/ A and OP OT at d/c once pain managed.     Time Calculation:   Evaluation Complexity (OT)  Review Occupational Profile/Medical/Therapy History Complexity: expanded/moderate complexity  Assessment, Occupational Performance/Identification of Deficit Complexity: 3-5 performance deficits  Clinical Decision Making Complexity (OT): detailed assessment/moderate  complexity  Overall Complexity of Evaluation (OT): moderate complexity     Time Calculation- OT       Row Name 06/03/25 1426             Time Calculation- OT    OT Start Time 1108  -JY      OT Received On 06/03/25  -JY      OT Goal Re-Cert Due Date 06/13/25  -JY         Timed Charges    63197 - OT Therapeutic Exercise Minutes 16  -JY         Untimed Charges    OT Eval/Re-eval Minutes 54  -JY         Total Minutes    Timed Charges Total Minutes 16  -JY      Untimed Charges Total Minutes 54  -JY       Total Minutes 70  -JY                User Key  (r) = Recorded By, (t) = Taken By, (c) = Cosigned By      Initials Name Provider Type    Leslee Kerr OT Occupational Therapist                  Therapy Charges for Today       Code Description Service Date Service Provider Modifiers Qty    28522861599 HC OT THER PROC EA 15 MIN 6/3/2025 Leslee Mejia OT GO 1    16770346289 HC OT EVAL MOD COMPLEXITY 4 6/3/2025 Leslee Mejia OT GO 1                 Leslee Mejia OT  6/3/2025

## 2025-06-03 NOTE — H&P
[facility] Medicine Services  HISTORY AND PHYSICAL    Patient Name: Alejo Condon  : 1960  MRN: [id number]  Primary Care Physician: Provider, No Known  Date of admission: 2025      Subjective   Subjective     Chief Complaint: Right elbow pain    HPI:  Alejo Condon is a 65 y.o. male with history of chronic HFrEF, type 2 diabetes, anxiety, hyperlipidemia who presents to the ED with 2 weeks of progressively worsening right elbow/arm redness, edema, warmth and tenderness. He denies any trauma, though reports having a recent scab that his girlfriend picked.  Patient was seen in the ED  but left  AMA.  He presented to urgent care today with these complaints and was directed to come to the ED. He denies any fevers or chills, no nausea or vomiting, no SOA.. Of note, he reports that he passed out this morning while bending over and was down for ~1 minutes.  On arrival to the ED, he is hemodynamically stable, initial labs revealed WBC 13, ESR 64, CRP 13, elbow x-ray, showed small joint effusion, subcutaneous fat stranding, possible cellulitis.  Ortho was consulted, they recommended to continue antibiotics, get MRI elbow and admission.  We will admit to hospital medicine service for further management.    Personal History     Past Medical History:   Diagnosis Date    Acute systolic CHF (congestive heart failure) 2020    Echo 20 LVEF = 31.0%. Left ventricular diastolic dysfunction is noted (grade III w/high LAP) consistent with reversible restrictive pattern Left atrial volume is moderately increased.    Diverticulitis     Elevated cholesterol     Hyperlipidemia     Hypertension     Opiate addiction     Resolved since on suboxone    R/o Asthma 10/29/2024    R/O FELIPA (obstructive sleep apnea) 2024           Past Surgical History:   Procedure Laterality Date    ANAL FISTULA REPAIR N/A     CARDIAC CATHETERIZATION N/A 2020    Procedure: Left Heart Cath;  Surgeon:  Garrett Trejo MD;  Location:  [facility];  Service: Cardiovascular;  Laterality: N/A;    COLONOSCOPY      COLONOSCOPY N/A 9/12/2023    Procedure: COLONOSCOPY FOR SCREENING;  Surgeon: Crystal Rodriguez MD;  Location:  [facility];  Service: Gastroenterology;  Laterality: N/A;    ENDOSCOPY      ENDOSCOPY N/A 9/12/2023    Procedure: ESOPHAGOGASTRODUODENOSCOPY WITH BIOPSY;  Surgeon: Crystal Rodriguez MD;  Location:  [facility];  Service: Gastroenterology;  Laterality: N/A;    FINGER SURGERY      INTERVENTIONAL RADIOLOGY PROCEDURE N/A 8/15/2024    Procedure: Pulmonary Angiogram and Embolization;  Surgeon: Jean Paul Garcia MD;  Location:  [facility];  Service: Interventional Radiology;  Laterality: N/A;    REFRACTIVE SURGERY      RETINAL DETACHMENT REPAIR  07/2021       Family History: family history includes Cardiomyopathy (age of onset: 36) in his brother; Heart attack (age of onset: 88) in his father; Heart failure in his mother; Hyperlipidemia in his mother; Hypertension in his mother.     Social History:  reports that he quit smoking about 57 years ago. His smoking use included cigarettes. He started smoking about 18 months ago. He has a 5 pack-year smoking history. He has been exposed to tobacco smoke. He has never used smokeless tobacco. He reports current alcohol use of about 2.0 standard drinks of alcohol per week. He reports that he does not currently use drugs after having used the following drugs: Marijuana and Oxycodone.  Social History     Social History Narrative    caffeine use: 2 servings of coffee daily    Retired from maintenance, construction,     Stop smoking October 2023    Previous heavy alcohol use, improved over the last 6 months       Medications:  Available home medication information reviewed.  albuterol sulfate HFA, atorvastatin, carvedilol, empagliflozin, furosemide, hydrOXYzine, losartan, sildenafil, spironolactone, and  traZODone    No Known Allergies    Objective   Objective     Vital Signs:   Temp:  [98.3 °F (36.8 °C)-100 °F (37.8 °C)] 99.2 °F (37.3 °C)  Heart Rate:  [57-94] 90  Resp:  [19-20] 19  BP: (108-124)/(61-80) 114/71       Physical Exam   Constitutional: Chronically ill-appearing male, uncomfortable from pain.  Eyes: PERRLA, sclerae anicteric, no conjunctival injection  HENT: NCAT, mucous membranes moist  Neck: Supple, no thyromegaly, no lymphadenopathy, trachea midline  Respiratory: Clear to auscultation bilaterally, nonlabored respirations   Cardiovascular: RRR, no murmurs, rubs, or gallops, palpable pedal pulses bilaterally  Gastrointestinal: Positive bowel sounds, soft, nontender, nondistended  Musculoskeletal: Right elbow with edema, erythema, warmth and tenderness  Psychiatric: Appropriate affect, cooperative  Neurologic: Oriented x 3, strength symmetric in all extremities, Cranial Nerves grossly intact to confrontation, speech clear  Skin: No rashes     Result Review:  I have personally reviewed the results from the time of this admission to 6/3/2025 00:13 EDT and agree with these findings:  [x]  Laboratory list / accordion  [x]  Microbiology  []  Radiology  []  EKG/Telemetry   []  Cardiology/Vascular   []  Pathology  []  Old records  []  Other:  Most notable findings include:     LAB RESULTS:      Lab 06/02/25 2106   WBC 13.37*   HEMOGLOBIN 14.4   HEMATOCRIT 43.0   PLATELETS 191   NEUTROS ABS 10.94*   IMMATURE GRANS (ABS) 0.04   LYMPHS ABS 1.19   MONOS ABS 1.15*   EOS ABS 0.02   MCV 90.5   SED RATE 62*   CRP 13.48*   PROCALCITONIN 0.09   LACTATE 1.4         Lab 06/02/25 2106   SODIUM 133*   POTASSIUM 3.7   CHLORIDE 98   CO2 24.0   ANION GAP 11.0   BUN 8.9   CREATININE 0.90   EGFR 94.2   GLUCOSE 189*   CALCIUM 8.2*         Lab 06/02/25 2106   TOTAL PROTEIN 6.2   ALBUMIN 3.5   GLOBULIN 2.7   ALT (SGPT) 39   AST (SGOT) 40   BILIRUBIN 0.4   ALK PHOS 58                     UA          6/16/2024    16:21    Urinalysis   Ketones, UA Negative    Leukocytes, UA Negative        Microbiology Results (last 10 days)       ** No results found for the last 240 hours. **            XR Elbow 2 View Right  Result Date: 6/2/2025  XR ELBOW 2 VW RIGHT Date of Exam: 6/2/2025 10:59 PM EDT Indication: erythema pain, swelling Comparison: None available. Findings: Bones of the right elbow joint appear anatomically aligned and intact. A small well-defined, benign-appearing calcification is seen adjacent the medial epicondyle, which appears chronic, not reflecting acute trauma. Lateral view shows a mildly displaced anterior fat pad consistent with small elbow joint effusion. There is also generalized reticulation of the subcutaneous fat, particularly along the dorsal and ulnar margins. No foreign body or soft tissue gas is seen.     Impression: Impression: 1. Subcutaneous fat stranding about the elbow whether edema or whether related to cellulitis. No evidence of foreign body. 2. Small elbow joint effusion. 3. No evidence of acute or healing or bony trauma of the right elbow. Electronically Signed: Tato Alvares MD  6/2/2025 11:05 PM EDT  Workstation ID: [id number]      Results for orders placed during the hospital encounter of 05/10/24    Adult Transthoracic Echo Complete w/ Color, Spectral and Contrast if necessary per protocol    Interpretation Summary    Left ventricular systolic function is normal. Left ventricular ejection fraction appears to be 51 - 55%.    Left ventricular wall thickness is consistent with hypertrophy.    Left ventricular diastolic function was normal.    The right ventricular cavity is dilated.    The left atrial cavity is dilated.    No significant change compared to 2022      Assessment & Plan   Assessment & Plan       Cellulitis of right upper extremity    Mixed hyperlipidemia    Acute systolic CHF (congestive heart failure)    Anxiety    Type 2 diabetes mellitus with hyperglycemia    Olecranon bursitis of right  elbow    65-year-old male history of chronic HFrEF, type 2 diabetes, anxiety, hyperlipidemia who presents to the ED with 3 weeks of progressively worsening right elbow/arm and edema, erythema, tenderness, admitted with right upper extremity cellulitis likely sec to right olecranon bursitis.    Right upper extremity cellulitis  Right olecranon bursitis  -Right elbow x-ray shows subcutaneous fat stranding around the elbow, small joint effusion. Patient with leukocytosis, ESR, CRP, septic joint remains a consideration, however per ED doc he did not have enough fluid to tap.  -Ortho consulted, recommend to get MRI w/wo  -Follow-up blood cultures, MRSA PCR, continue broad-spectrum antibiotics with vancomycin and Rocephin, ID consult for the a.m.  -Continue pain control    Syncope  - Etiology unknown, BP currently normotensive.  - Hold antihypertensives, will give gentle fluids  - Follow-up orthostatic vital signs    Chronic HFrEF  Hypertension  - Currently seems compensated, patient is on Coreg, Jardiance losartan, Lasix, Aldactone, hold these his evening, can resume in the a.m.  - Followed by cardiology Dr. Trejo    Type 2 diabetes previously well-controlled with A1c 6.4% 2024  - Follow-up repeat A1c, start SSI    Anxiety  - On trazodone qhs    Hyperlipidemia  -Continue statin    Hyponatremia    VTE Prophylaxis:  Mechanical VTE prophylaxis orders are signed & held.        CODE STATUS:    Code Status and Medical Interventions: CPR (Attempt to Resuscitate); Full Support   Ordered at: 06/03/25 0005     Code Status (Patient has no pulse and is not breathing):    CPR (Attempt to Resuscitate)     Medical Interventions (Patient has pulse or is breathing):    Full Support     Level Of Support Discussed With:    Patient     Expected Discharge   Expected Discharge Date: 6/4/2025; Expected Discharge Time:      Wally Borja MD  06/03/25

## 2025-06-03 NOTE — PROGRESS NOTES
"    [facility] Medicine Services  PROGRESS NOTE    Patient Name: Alejo Condon  : 1960  MRN: [id number]    Date of Admission: 2025  Primary Care Physician: Provider, No Known    Subjective   Subjective     CC:  Olecranon cellulitis    HPI:  Patient reporting poor pain control and threatning to leave if it is not better controlled. Elbow \"keeps getting worse\" per his report. Sig swelling and erythema noted.     ROS  As above       Objective   Objective     Vital Signs:   Temp:  [97.7 °F (36.5 °C)-100 °F (37.8 °C)] 97.7 °F (36.5 °C)  Heart Rate:  [] 111  Resp:  [18-20] 18  BP: (108-131)/(61-82) 120/79  Flow (L/min) (Oxygen Therapy):  [2] 2     Physical Exam:  GEN: NAD, resting in bed, awake  HEENT: on room air, atraumatic, normocephalic  NECK: supple, no masses  RESP: on room air, normal effort  CV: on tele, sinus rhythm  PSYCH: normal affect, appropriate  NEURO: awake, alert, no focal deficits noted  MSK: right arm with sig erythema and swelling, TTP  SKIN: no rashes noted       Results Reviewed:  LAB RESULTS:      Lab 25   WBC 11.66* 13.37*   HEMOGLOBIN 13.5 14.4   HEMATOCRIT 41.8 43.0   PLATELETS 152 191   NEUTROS ABS 8.71* 10.94*   IMMATURE GRANS (ABS) 0.04 0.04   LYMPHS ABS 1.72 1.19   MONOS ABS 1.11* 1.15*   EOS ABS 0.05 0.02   MCV 93.5 90.5   SED RATE  --  62*   CRP  --  13.48*   PROCALCITONIN  --  0.09   LACTATE  --  1.4         Lab 25  0452 25   SODIUM 135* 133*   POTASSIUM 3.5 3.7   CHLORIDE 104 98   CO2 24.0 24.0   ANION GAP 7.0 11.0   BUN 8.2 8.9   CREATININE 0.75* 0.90   EGFR 99.5 94.2   GLUCOSE 136* 189*   CALCIUM 7.7* 8.2*   HEMOGLOBIN A1C  --  5.90*         Lab 25   TOTAL PROTEIN 6.2   ALBUMIN 3.5   GLOBULIN 2.7   ALT (SGPT) 39   AST (SGOT) 40   BILIRUBIN 0.4   ALK PHOS 58                     Brief Urine Lab Results  (Last result in the past 365 days)        Color   Clarity   Blood   Leuk Est   Nitrite   " Protein   CREAT   Urine HCG        06/16/24 1621 Yellow   Clear   Negative   Negative   Negative   Negative                   Microbiology Results Abnormal       None            MRI Elbow Right With & Without Contrast  Result Date: 6/3/2025  MRI ELBOW RIGHT W WO CONTRAST Date of Exam: 6/3/2025 10:21 AM EDT Indication: evaluate for drainable fluid collection. Cellulitis. Redness and pain.  Comparison: Elbow radiograph 6/2/2025 Technique:  Routine multiplanar/multisequence sequence images of the right elbow were obtained before and after the uneventful administration of 20 mL Multihance.  Findings: Ligaments: The ulnar collateral ligament is intact. The radial collateral ligament is intact. The lateral ulnar collateral ligament is intact. Annular ligament is within normal limits. Tendons: The biceps insertion on the radial tuberosity is normal. The brachialis tendon is normal. The triceps tendon is normal. The common flexor tendon origin is intact. The common extensor tendon origin is intact. Bone: Normal bone marrow signal intensity for age. No fractures or malalignment. Cartilage: Mild cartilage narrowing and joint space narrowing of the elbow most pronounced at the ulnar trochlear/medial aspect. Nerves: No evidence of ulnar, median or radial nerve abnormality. Other Intraarticular: Small joint effusion. No loose bodies identified. Extra-articular: There is a rim-enhancing subcutaneous collection at the olecranon bursa. The margins are ill-defined. It measures 6.3 cm mediolateral by 1.5 cm anterior posterior by craniocaudal dimension of up to 10.2 cm. There is associated edema and enhancement in the tissues. There is no sinus tract to skin surface or joint. There is soft tissue edema in the anterior elbow along the distal biceps muscle and musculotendinous junction.     Impression: 1.There is a large rim-enhancing subcutaneous collection at the olecranon bursa. There is extensive soft tissue edema. Abscess is  favored over a bland bursitis.. There is no sinus tract to the skin surface or joint. 2.There is soft tissue edema and enhancement in the anterior elbow. Cellulitis with possible developing myositis. 3.There is mild arthritis of the elbow with small joint effusion. Electronically Signed: Indu Wang MD  6/3/2025 11:29 AM EDT  Workstation ID: [id number]    XR Elbow 2 View Right  Result Date: 6/2/2025  XR ELBOW 2 VW RIGHT Date of Exam: 6/2/2025 10:59 PM EDT Indication: erythema pain, swelling Comparison: None available. Findings: Bones of the right elbow joint appear anatomically aligned and intact. A small well-defined, benign-appearing calcification is seen adjacent the medial epicondyle, which appears chronic, not reflecting acute trauma. Lateral view shows a mildly displaced anterior fat pad consistent with small elbow joint effusion. There is also generalized reticulation of the subcutaneous fat, particularly along the dorsal and ulnar margins. No foreign body or soft tissue gas is seen.     Impression: Impression: 1. Subcutaneous fat stranding about the elbow whether edema or whether related to cellulitis. No evidence of foreign body. 2. Small elbow joint effusion. 3. No evidence of acute or healing or bony trauma of the right elbow. Electronically Signed: Tato Alvares MD  6/2/2025 11:05 PM EDT  Workstation ID: [id number]      Results for orders placed during the hospital encounter of 05/10/24    Adult Transthoracic Echo Complete w/ Color, Spectral and Contrast if necessary per protocol    Interpretation Summary    Left ventricular systolic function is normal. Left ventricular ejection fraction appears to be 51 - 55%.    Left ventricular wall thickness is consistent with hypertrophy.    Left ventricular diastolic function was normal.    The right ventricular cavity is dilated.    The left atrial cavity is dilated.    No significant change compared to 2022      Current medications:  Scheduled Meds:[START ON 6/4/2025]  Pharmacy Consult, , Not Applicable, Once  atorvastatin, 20 mg, Oral, Nightly  cefTRIAXone, 2,000 mg, Intravenous, Q24H  insulin lispro, 2-7 Units, Subcutaneous, 4x Daily AC & at Bedtime  potassium chloride ER, 40 mEq, Oral, Q4H  sodium chloride, 10 mL, Intravenous, Q12H  traZODone, 50 mg, Oral, Nightly  vancomycin, 1,250 mg, Intravenous, Q12H      Continuous Infusions:Pharmacy to dose vancomycin,       PRN Meds:.  senna-docusate sodium **AND** polyethylene glycol **AND** bisacodyl **AND** bisacodyl    Calcium Replacement - Follow Nurse / BPA Driven Protocol    dextrose    dextrose    glucagon (human recombinant)    HYDROcodone-acetaminophen    HYDROmorphone    Magnesium Standard Dose Replacement - Follow Nurse / BPA Driven Protocol    ondansetron    Pharmacy to dose vancomycin    Phosphorus Replacement - Follow Nurse / BPA Driven Protocol    Potassium Replacement - Follow Nurse / BPA Driven Protocol    sodium chloride    sodium chloride    Assessment & Plan   Assessment & Plan     Active Hospital Problems    Diagnosis  POA    **Cellulitis of right upper extremity [L03.113]  Yes    Olecranon bursitis of right elbow [M70.21]  Yes    Type 2 diabetes mellitus with hyperglycemia [E11.65]  Yes    Anxiety [F41.9]  Yes    Acute systolic CHF (congestive heart failure) [I50.21]  Yes    Mixed hyperlipidemia [E78.2]  Yes      Resolved Hospital Problems   No resolved problems to display.        Brief Hospital Course to date:  Alejo Condon is a 65-year-old male history of chronic HFrEF, type 2 diabetes, anxiety, hyperlipidemia who presents to the ED with 3 weeks of progressively worsening right elbow/arm and edema, erythema, tenderness, admitted with right upper extremity cellulitis likely sec to right olecranon bursitis.    This patient's problems and plans were partially entered by my partner and updated as appropriate by me 06/03/25.       Right upper extremity cellulitis  Right olecranon bursitis  -Right elbow x-ray shows  subcutaneous fat stranding around the elbow, small joint effusion. Patient with leukocytosis, ESR, CRP, septic joint remains a consideration, however per ED doc he did not have enough fluid to tap.  -Ortho consulted, have d/w them, based on MRI results, patient to go for I&D tomorrow AM, NPO at [facility]  -Follow-up blood cultures, MRSA PCR, continue broad-spectrum antibiotics with vancomycin and Rocephin, ID consulted, have d/w Dr Dewitt  -Continue pain control- have increased today and patient at high risk of leaving AMA with inadequate pain control      Syncope  - Etiology unknown, BP currently normotensive.  - Hold antihypertensives, will give gentle fluids  - Follow-up orthostatic vital signs     Chronic HFrEF  Hypertension  - Currently seems compensated, patient is on Coreg, Jardiance losartan, Lasix, Aldactone, hold these his evening, can resume in the a.m.  - Followed by cardiology Dr. Trejo     Type 2 diabetes previously well-controlled with A1c 6.4% 2024  - Follow-up repeat A1c, start SSI     Anxiety  - On trazodone qhs     Hyperlipidemia  -Continue statin     Hyponatremia    Expected Discharge Location and Transportation: pending above   Expected Discharge =  Expected Discharge Date: 6/4/2025; Expected Discharge Time:      VTE Prophylaxis:  Mechanical VTE prophylaxis orders are present.         AM-PAC 6 Clicks Score (PT): 18 (06/03/25 1316)    CODE STATUS:   Code Status and Medical Interventions: CPR (Attempt to Resuscitate); Full Support   Ordered at: 06/03/25 0005     Code Status (Patient has no pulse and is not breathing):    CPR (Attempt to Resuscitate)     Medical Interventions (Patient has pulse or is breathing):    Full Support     Level Of Support Discussed With:    Patient       Meri Hdz MD  06/03/25

## 2025-06-03 NOTE — THERAPY EVALUATION
Patient Name: Alejo Condon  : 1960    MRN: [id number]                              Today's Date: 6/3/2025       Admit Date: 2025    Visit Dx:     ICD-10-CM ICD-9-CM   1. Cellulitis of right upper extremity  L03.113 682.3   2. Olecranon bursitis of right elbow  M70.21 726.33     Patient Active Problem List   Diagnosis    Mixed hyperlipidemia    Acute systolic CHF (congestive heart failure)    Alcohol abuse    Age-related nuclear cataract of right eye    Benign essential hypertension    Diverticulitis    Erectile dysfunction    Irritable bowel syndrome (IBS)    Lumbar degenerative disc disease    Posterior tibial tendinitis of right lower extremity    Pseudophakia, right eye    Epiretinal membrane, right eye    Serous choroidal detachment of right eye    H/O vitrectomy    Other specified abdominal hernia without obstruction or gangrene    Anxiety    Former smoker (Stopped 10/2023)    Type 2 diabetes mellitus with hyperglycemia    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 Left ventricular diastolic dysfunction is noted (grade III w/high LAP) consistent with reversible restrictive pattern Left atrial volume is moderately increased.    Diverticulitis     Elevated cholesterol     Hyperlipidemia     Hypertension     Opiate addiction     Resolved since on suboxone    R/o Asthma 10/29/2024    R/O FELIPA (obstructive sleep apnea) 2024     Past Surgical History:   Procedure Laterality Date    ANAL FISTULA REPAIR N/A     CARDIAC CATHETERIZATION N/A 2020    Procedure: Left Heart Cath;   Surgeon: Garrett Trejo MD;  Location:  [facility];  Service: Cardiovascular;  Laterality: N/A;    COLONOSCOPY      COLONOSCOPY N/A 9/12/2023    Procedure: COLONOSCOPY FOR SCREENING;  Surgeon: Crystal Rodriguez MD;  Location:  [facility];  Service: Gastroenterology;  Laterality: N/A;    ENDOSCOPY      ENDOSCOPY N/A 9/12/2023    Procedure: ESOPHAGOGASTRODUODENOSCOPY WITH BIOPSY;  Surgeon: Crystal Rodriguez MD;  Location:  [facility];  Service: Gastroenterology;  Laterality: N/A;    FINGER SURGERY      INTERVENTIONAL RADIOLOGY PROCEDURE N/A 8/15/2024    Procedure: Pulmonary Angiogram and Embolization;  Surgeon: Jean Paul Garcia MD;  Location:  [facility];  Service: Interventional Radiology;  Laterality: N/A;    REFRACTIVE SURGERY      RETINAL DETACHMENT REPAIR  07/2021      General Information       Row Name 06/03/25 1300          Physical Therapy Time and Intention    Document Type evaluation  -     Mode of Treatment physical therapy  -       Row Name 06/03/25 1300          General Information    Patient Profile Reviewed yes  -     Prior Level of Function independent:;all household mobility;community mobility;gait;transfer;bed mobility;ADL's;dressing;bathing;driving  No AD use at baseline. Has cane or walker if needed. Only endorses the 1 acute fall PTA  -     Existing Precautions/Restrictions fall;other (see comments)  RUE cellulitis  -     Barriers to Rehab medically complex  -       Row Name 06/03/25 1300          Living Environment    Current Living Arrangements home  -     People in Home friend(s)  -       Row Name 06/03/25 1300          Home Main Entrance    Number of Stairs, Main Entrance none  -       Row Name 06/03/25 1300          Stairs Within Home, Primary    Stairs, Within Home, Primary Flight down to basement w/ laundry room  -     Number of Stairs, Within Home, Primary other (see comments)  14  -     Stair Railings, Within Home,  Primary railing on left side (ascending)  -       Row Name 06/03/25 1300          Cognition    Orientation Status (Cognition) oriented x 3  -[facility] Name 06/03/25 1300          Safety Issues/Impairments Affecting Functional Mobility    Safety Issues Affecting Function (Mobility) awareness of need for assistance;insight into deficits/self-awareness;safety precaution awareness  -     Impairments Affecting Function (Mobility) balance;endurance/activity tolerance;pain;range of motion (ROM);strength  -               User Key  (r) = Recorded By, (t) = Taken By, (c) = Cosigned By      Initials Name Provider Type     Leonela Hernandez, HERNANDEZ Physical Therapist                   Mobility       Row Name 06/03/25 1305          Bed Mobility    Bed Mobility supine-sit  -     Supine-Sit [place] (Bed Mobility) contact guard;verbal cues  -     Assistive Device (Bed Mobility) bed rails;head of bed elevated  -     Comment, (Bed Mobility) Required increased time and effort d/t pain. Orthostatics taken w/o symptoms or drop in BP noted  -[facility] Name 06/03/25 1305          Transfers    Comment, (Transfers) Cues for safety awareness  -[facility] Name 06/03/25 1305          Sit-Stand Transfer    Sit-Stand [place] (Transfers) contact guard;verbal cues  -     Comment, (Sit-Stand Transfer) No AD used  -[facility] Name 06/03/25 1305          Gait/Stairs (Locomotion)    [place] Level (Gait) contact guard;verbal cues  -     Assistive Device (Gait) other (see comments)  UE support on IV pole  -     Patient was able to Ambulate yes  -     Distance in Feet (Gait) 36  -     Deviations/Abnormal Patterns (Gait) tish decreased;stride length decreased  -     Bilateral Gait Deviations forward flexed posture;heel strike decreased  -     Comment, (Gait/Stairs) Pt ambulated in room w/ decreased step length and decreased tish. VCs for safety awareness. Mildly unsteady w/o overt LOB. Distance limited  by RUE pain  -[facility] Name 06/03/25 1305          Mobility    Extremity Weight-bearing Status right upper extremity  -     Right Upper Extremity (Weight-bearing Status) weight-bearing as tolerated (WBAT)  -               User Key  (r) = Recorded By, (t) = Taken By, (c) = Cosigned By      Initials Name Provider Type     Leonela Hernandez, PT Physical Therapist                   Obj/Interventions       [facility] Name 06/03/25 1309          Range of Motion Comprehensive    General Range of Motion bilateral lower extremity ROM WFL  -[facility] Name 06/03/25 1309          Strength Comprehensive (MMT)    General Manual Muscle Testing (MMT) Assessment lower extremity strength deficits identified  -     Comment, General Manual Muscle Testing (MMT) Assessment BLEs grossly 4/5  -[facility] Name 06/03/25 1309          Balance    Balance Assessment sitting static balance;sitting dynamic balance;standing static balance;standing dynamic balance  -     Static Sitting Balance supervision  -     Dynamic Sitting Balance standby assist  -     Position, Sitting Balance unsupported;sitting edge of bed  -     Static Standing Balance standby assist  -     Dynamic Standing Balance contact guard;verbal cues  -     Position/Device Used, Standing Balance supported  -     Balance Interventions sitting;standing;sit to stand;supported;static;dynamic  -[facility] Name 06/03/25 1309          Sensory Assessment (Somatosensory)    Sensory Assessment (Somatosensory) LE sensation intact  -               User Key  (r) = Recorded By, (t) = Taken By, (c) = Cosigned By      Initials Name Provider Type     Leonela Hernandez, PT Physical Therapist                   Goals/Plan       [facility] Name 06/03/25 1315          Bed Mobility Goal 1 (PT)    Activity/Assistive Device (Bed Mobility Goal 1, PT) bed mobility activities, all  -     [place] Level/Cues Needed (Bed Mobility Goal 1, PT) independent  -     Time Frame (Bed Mobility  Goal 1, PT) short term goal (STG);5 days  -     Progress/Outcomes (Bed Mobility Goal 1, PT) new goal  -       Row Name 06/03/25 1315          Transfer Goal 1 (PT)    Activity/Assistive Device (Transfer Goal 1, PT) sit-to-stand/stand-to-sit;bed-to-chair/chair-to-bed  -     [place] Level/Cues Needed (Transfer Goal 1, PT) independent  -     Time Frame (Transfer Goal 1, PT) long term goal (LTG);10 days  -LH     Progress/Outcome (Transfer Goal 1, PT) new goal  -       Row Name 06/03/25 1315          Gait Training Goal 1 (PT)    Activity/Assistive Device (Gait Training Goal 1, PT) gait (walking locomotion);assistive device use  -     [place] Level (Gait Training Goal 1, PT) independent  -     Distance (Gait Training Goal 1, PT) 200 ft  -     Time Frame (Gait Training Goal 1, PT) long term goal (LTG);10 days  -     Progress/Outcome (Gait Training Goal 1, PT) new goal  -       Row Name 06/03/25 1315          Stairs Goal 1 (PT)    Activity/Assistive Device (Stairs Goal 1, PT) ascending stairs;using handrail, left;descending stairs;using handrail, right;assistive device use  -     [place] Level/Cues Needed (Stairs Goal 1, PT) supervision required  -     Number of Stairs (Stairs Goal 1, PT) 14  -LH     Time Frame (Stairs Goal 1, PT) long term goal (LTG);10 days  -     Progress/Outcome (Stairs Goal 1, PT) new goal  -       Row Name 06/03/25 1315          Therapy Assessment/Plan (PT)    Planned Therapy Interventions (PT) balance training;bed mobility training;gait training;home exercise program;neuromuscular re-education;patient/family education;postural re-education;ROM (range of motion);stair training;strengthening;stretching;motor coordination training;transfer training  -               User Key  (r) = Recorded By, (t) = Taken By, (c) = Cosigned By      Initials Name Provider Type     Leonela Hernandez, PT Physical Therapist                   Clinical Impression       Row Name  06/03/25 1310          Pain    Pretreatment Pain Rating 10/10  -     Posttreatment Pain Rating 10/10  -     Pain Location extremity  -     Pain Side/Orientation right;upper  -     Pain Management Interventions activity modification encouraged;exercise or physical activity utilized;positioning techniques utilized;nursing notified  -     Response to Pain Interventions activity participation with increased pain  -       Row Name 06/03/25 1310          Plan of Care Review    Plan of Care Reviewed With patient  -     Outcome Evaluation PT eval completed. Pt presents below baseline function d/t increased RUE pain, mild balance deficits, and decreased activity tolerance. Pt ambulated in room w/ UE support on IV pole, CGA. Mobility limited by RUE pain this date. Pt would benefit from IP PT services while hospitalized. Anticipate pt to d/c home w/ assist once medically appropriate. He may also benefit from additional OP PT once discharged.  -       Row Name 06/03/25 1310          Therapy Assessment/Plan (PT)    Patient/Family Therapy Goals Statement (PT) to go home and have less pain  -     Rehab Potential (PT) good  -     Criteria for Skilled Interventions Met (PT) yes;meets criteria;skilled treatment is necessary  [facility]     Therapy Frequency (PT) daily  -     Predicted Duration of Therapy Intervention (PT) 10 days  -       Row Name 06/03/25 1310          Vital Signs    Pre Systolic BP Rehab 133  -LH     Pre Treatment Diastolic BP 91  -LH     Intra Systolic BP Rehab 136  -LH     Intra Treatment Diastolic BP 90  -LH     Post Systolic BP Rehab 127  -LH     Post Treatment Diastolic BP 78  -LH     Pretreatment Heart Rate (beats/min) 109  -LH     Intratreatment Heart Rate (beats/min) 114  -LH     Posttreatment Heart Rate (beats/min) 114  -LH     O2 Delivery Pre Treatment room air  -     Pre Patient Position Supine  -LH     Intra Patient Position Sitting  -LH     Post Patient Position Standing  -        Row Name 06/03/25 1310          Positioning and Restraints    Pre-Treatment Position in bed  -LH     Post Treatment Position chair  -LH     In Chair notified nsg;reclined;sitting;with OT  -               User Key  (r) = Recorded By, (t) = Taken By, (c) = Cosigned By      Initials Name Provider Type     Leonela Hernandez PT Physical Therapist                   Outcome Measures       Row Name 06/03/25 1316          How much help from another person do you currently need...    Turning from your back to your side while in flat bed without using bedrails? 3  -LH     Moving from lying on back to sitting on the side of a flat bed without bedrails? 3  -LH     Moving to and from a bed to a chair (including a wheelchair)? 3  -LH     Standing up from a chair using your arms (e.g., wheelchair, bedside chair)? 3  -LH     Climbing 3-5 steps with a railing? 3  -LH     To walk in hospital room? 3  -     AM-PAC 6 Clicks Score (PT) 18  -     Highest Level of Mobility Goal Walk 10 Steps or More-6  -       Row Name 06/03/25 1316          Functional Assessment    Outcome Measure Options AM-PAC 6 Clicks Basic Mobility (PT)  -               User Key  (r) = Recorded By, (t) = Taken By, (c) = Cosigned By      Initials Name Provider Type     Leonela Hernandez PT Physical Therapist                                 Physical Therapy Education       Title: PT OT SLP Therapies (In Progress)       Topic: Physical Therapy (In Progress)       Point: Mobility training (In Progress)       Learning Progress Summary            Patient Acceptance, E, NR by  at 6/3/2025 1317                      Point: Home exercise program (Not Started)       Learner Progress:  Not documented in this visit.              Point: Body mechanics (In Progress)       Learning Progress Summary            Patient Acceptance, E, NR by  at 6/3/2025 1317                      Point: Precautions (In Progress)       Learning Progress Summary            Patient  Acceptance, E, NR by  at 6/3/2025 1317                                      User Key       Initials Effective Dates Name Provider Type Discipline     09/21/23 -  Leonela Hernandez, PT Physical Therapist PT                  PT Recommendation and Plan  Planned Therapy Interventions (PT): balance training, bed mobility training, gait training, home exercise program, neuromuscular re-education, patient/family education, postural re-education, ROM (range of motion), stair training, strengthening, stretching, motor coordination training, transfer training  Outcome Evaluation: PT eval completed. Pt presents below baseline function d/t increased RUE pain, mild balance deficits, and decreased activity tolerance. Pt ambulated in room w/ UE support on IV pole, CGA. Mobility limited by RUE pain this date. Pt would benefit from IP PT services while hospitalized. Anticipate pt to d/c home w/ assist once medically appropriate. He may also benefit from additional OP PT once discharged.     Time Calculation:   PT Evaluation Complexity  History, PT Evaluation Complexity: 3 or more personal factors and/or comorbidities  Examination of Body Systems (PT Eval Complexity): total of 4 or more elements  Clinical Presentation (PT Evaluation Complexity): evolving  Clinical Decision Making (PT Evaluation Complexity): moderate complexity  Overall Complexity (PT Evaluation Complexity): moderate complexity     PT Charges       Row Name 06/03/25 1317             Time Calculation    Start Time 1116  -      PT Received On 06/03/25  -      PT Goal Re-Cert Due Date 06/13/25  -         Untimed Charges    PT Eval/Re-eval Minutes 50  -         Total Minutes    Untimed Charges Total Minutes 50  -       Total Minutes 50  -                User Key  (r) = Recorded By, (t) = Taken By, (c) = Cosigned By      Initials Name Provider Type     Leonela Hernandez, PT Physical Therapist                  Therapy Charges for Today       Code Description  Service Date Service Provider Modifiers Qty    41006047359 HC PT EVAL MOD COMPLEXITY 4 6/3/2025 Leonela Hernandez, PT GP 1            PT G-Codes  Outcome Measure Options: AM-PAC 6 Clicks Basic Mobility (PT)  AM-PAC 6 Clicks Score (PT): 18  PT Discharge Summary  Anticipated Discharge Disposition (PT): home with assist, home with outpatient therapy services    Leonela Hernandez, PT  6/3/2025

## 2025-06-03 NOTE — PROGRESS NOTES
Discharge Planning Assessment  [facility]     Patient Name: Alejo Condon  MRN: [id number]  Today's Date: 6/3/2025    Admit Date: 6/2/2025        Discharge Needs Assessment       Row Name 06/03/25 1609       Living Environment    Current Living Arrangements home    Primary Care Provided by self    Provides Primary Care For no one    Quality of Family Relationships helpful       Resource/Environmental Concerns    Resource/Environmental Concerns none    Financial Concerns none    Transportation Concerns none       Transition Planning    Patient/Family Anticipates Transition to home    Patient/Family Anticipated Services at Transition     Transportation Anticipated family or friend will provide       Discharge Needs Assessment    Readmission Within the Last 30 Days no previous admission in last 30 days    Equipment Currently Used at Home none    Concerns to be Addressed discharge planning    Anticipated Changes Related to Illness none    Equipment Needed After Discharge none                   Discharge Plan       Row Name 06/03/25 1610       Plan    Plan Comments Mr. Condon lives in [place] and he has [employer] Medicare insurance coverage and he is going to have a I & D procedure on Wednesday and case management is following for discharge planning needs.                    Expected Discharge Date and Time       Expected Discharge Date Expected Discharge Time    Jun 4, 2025            Demographic Summary       Row Name 06/03/25 1608       General Information    Referral Source patient    Reason for Consult discharge planning    Preferred Language English       Contact Information    Permission Granted to Share Info With     Contact Information Obtained for                    Functional Status       Row Name 06/03/25 1608       Functional Status    Usual Activity Tolerance moderate    Current Activity Tolerance moderate       Functional Status, IADL    Medications assistive  equipment and person    Meal Preparation assistive equipment and person    Housekeeping assistive equipment and person    Laundry assistive equipment and person    Shopping assistive equipment and person                   Psychosocial    No documentation.                  Abuse/Neglect    No documentation.                  Legal    No documentation.                  Substance Abuse    No documentation.                  Patient Forms    No documentation.                     NASEEM Donnelly

## 2025-06-03 NOTE — CONSULTS
INFECTIOUS DISEASE CONSULT/INITIAL HOSPITAL VISIT    Alejo Condon  1960  [id number]    Date of consult: 6/3/25    Admit date: 6/2/2025    Requesting Provider: Dr. Borja  Evaluating physician: Joby Paz MD  Reason for Consultation: Right upper extremity cellulitis, right olecranon bursitis   Chief Complaint: right elbow pain      Subjective   History of present illness:  Alejo Condon is a  65 y.o.  Yr old male  with a past medical history of anxiety, hyperlipidemia, diabetes mellitus type 2, and chronic heart failure with reduced ejection fraction who presented to the ER on 6/2 with complaints of 2 weeks of progressively worsening right elbow/arm redness, swelling, warmth, and tenderness.  He did not have any trauma to the area although reported having a recent scab over the region that his girlfriend picked at.  The patient was recently seen in the ER on 5/31 but he left AGAINST MEDICAL ADVICE.  He presented to urgent care on 6/2 with complaints and was directed to come to the ER.  He was not any fevers or chills prior to arrival.  He did report a syncopal episode while he was bending over and was down for about 1 minute on 6/2.    Since arrival, Tmax has been 99.2 °F.  He has been hemodynamically stable aside from some mild tachycardia with heart rate up to 113 bpm.   White blood cell count was elevated 13.37 on arrival and his trended down to 11.66.   Creatinine is stable at 0.75.  MRSA PCR nares was negative.   LFTs are within normal limits.  Lactic acid was 1.4.  Procalcitonin was 0.09.   Sed rate was elevated to 62 and CRP was elevated at 13.5.  Hemoglobin A1c was 5.9.  Blood cultures are in progress. X-ray of the elbow showed a small joint effusion, subcutaneous fat stranding, possible cellulitis. Orthopedic surgery was consulted when the patient was in the ER and recommended continuing antibiotics and getting an MRI of the elbow and admission.   Dr. Gloria milian with orthopedic surgery does  think that the patient's infection will resolve with antibiotics alone but is keeping the patient n.p.o. until MRI results are reviewed. The patient has been started on empiric IV vancomycin and IV ceftriaxone.  ID has been consulted for antibiotic recommendations in the setting of the patient's right upper extremity cellulitis and possible right olecranon bursitis.    Past Medical History:   Diagnosis Date    Acute systolic CHF (congestive heart failure) 05/13/2020    Echo 5/6/20 LVEF = 31.0%. Left ventricular diastolic dysfunction is noted (grade III w/high LAP) consistent with reversible restrictive pattern Left atrial volume is moderately increased.    Diverticulitis     Elevated cholesterol     Hyperlipidemia     Hypertension     Opiate addiction     Resolved since on suboxone    R/o Asthma 10/29/2024    R/O FELIPA (obstructive sleep apnea) 8/1/2024       Past Surgical History:   Procedure Laterality Date    ANAL FISTULA REPAIR N/A     CARDIAC CATHETERIZATION N/A 08/25/2020    Procedure: Left Heart Cath;  Surgeon: Garrett Trejo MD;  Location:  [facility];  Service: Cardiovascular;  Laterality: N/A;    COLONOSCOPY      COLONOSCOPY N/A 9/12/2023    Procedure: COLONOSCOPY FOR SCREENING;  Surgeon: Crystal Rodriguez MD;  Location: [facility] OR;  Service: Gastroenterology;  Laterality: N/A;    ENDOSCOPY      ENDOSCOPY N/A 9/12/2023    Procedure: ESOPHAGOGASTRODUODENOSCOPY WITH BIOPSY;  Surgeon: Crystal Rodriguez MD;  Location:  [facility] OR;  Service: Gastroenterology;  Laterality: N/A;    FINGER SURGERY      INTERVENTIONAL RADIOLOGY PROCEDURE N/A 8/15/2024    Procedure: Pulmonary Angiogram and Embolization;  Surgeon: Jean Paul Garcia MD;  Location:  [facility];  Service: Interventional Radiology;  Laterality: N/A;    REFRACTIVE SURGERY      RETINAL DETACHMENT REPAIR  07/2021       Pediatric History   Patient Parents    Not on file     Other Topics Concern    Not on  file   Social History Narrative    caffeine use: 2 servings of coffee daily    Retired from maintenance, construction,     Stop smoking October 2023    Previous heavy alcohol use, improved over the last 6 months       family history includes Cardiomyopathy (age of onset: 36) in his brother; Heart attack (age of onset: 88) in his father; Heart failure in his mother; Hyperlipidemia in his mother; Hypertension in his mother.    No Known Allergies    Immunization History   Administered Date(s) Administered    COVID-19 (PFIZER) Purple Cap Monovalent 12/24/2021    Covid-19 (Pfizer) Gray Cap Monovalent 01/20/2022    Influenza, Unspecified 01/15/2015    MMR 03/21/2012, 04/26/2012    PPD Test 08/18/2009, 09/01/2009, 12/07/2010, 12/15/2010, 03/19/2012, 02/13/2013, 02/26/2013, 02/26/2014    Pneumococcal Polysaccharide (PPSV23) 09/14/2018    Tdap 03/19/2012, 06/20/2017       Medication:    Current Facility-Administered Medications:     [START ON 6/4/2025] ! Vancomycin level before 10:00 dose on Wednesday 6/4/25; hold dose until level checked by pharmacist, , Not Applicable, Once, Carlos Colon RP    atorvastatin (LIPITOR) tablet 20 mg, 20 mg, Oral, Nightly, Wally Borja MD    sennosides-docusate (PERICOLACE) 8.6-50 MG per tablet 2 tablet, 2 tablet, Oral, BID PRN **AND** polyethylene glycol (MIRALAX) packet 17 g, 17 g, Oral, Daily PRN **AND** bisacodyl (DULCOLAX) EC tablet 5 mg, 5 mg, Oral, Daily PRN **AND** bisacodyl (DULCOLAX) suppository 10 mg, 10 mg, Rectal, Daily PRN, Wally Borja MD    Calcium Replacement - Follow Nurse / BPA Driven Protocol, , Not Applicable, PRN, Wally Borja MD    cefTRIAXone (ROCEPHIN) 2,000 mg in sodium chloride 0.9 % 100 mL MBP, 2,000 mg, Intravenous, Q24H, Carlos Colon RPH    dextrose (D50W) (25 g/50 mL) IV injection 25 g, 25 g, Intravenous, Q15 Min PRN, Wally Borja MD    dextrose (GLUTOSE) oral gel 15 g, 15 g, Oral, Q15 Min PRN, Wally Borja MD    glucagon  (GLUCAGEN) injection 1 mg, 1 mg, Intramuscular, Q15 Min PRN, Wally Borja MD    HYDROcodone-acetaminophen (NORCO) 5-325 MG per tablet 1 tablet, 1 tablet, Oral, Q6H PRN, Wally Borja MD    HYDROmorphone (DILAUDID) injection 0.5 mg, 0.5 mg, Intravenous, Q2H PRN, Wally Borja MD, 0.5 mg at 06/03/25 0933    Insulin Lispro (humaLOG) injection 2-7 Units, 2-7 Units, Subcutaneous, 4x Daily AC & at Bedtime, Wally Borja MD, 2 Units at 06/03/25 0934    Magnesium Standard Dose Replacement - Follow Nurse / BPA Driven Protocol, , Not Applicable, PRN, Wally Borja MD    ondansetron (ZOFRAN) injection 4 mg, 4 mg, Intravenous, Q6H PRN, Wally Borja MD    Pharmacy to dose vancomycin, , Not Applicable, Continuous PRN, Wally Borja MD    Phosphorus Replacement - Follow Nurse / BPA Driven Protocol, , Not Applicable, PRN, Wally Borja MD    potassium chloride (KLOR-CON M20) CR tablet 40 mEq, 40 mEq, Oral, Q4H, Cora Nash MD    Potassium Replacement - Follow Nurse / BPA Driven Protocol, , Not Applicable, PRN, Wally Borja MD    sodium chloride 0.9 % flush 10 mL, 10 mL, Intravenous, Q12H, Wally Borja MD, 10 mL at 06/03/25 0115    sodium chloride 0.9 % flush 10 mL, 10 mL, Intravenous, PRN, Wally Borja MD    sodium chloride 0.9 % infusion 40 mL, 40 mL, Intravenous, PRN, Wally Borja MD    sodium chloride 0.9 % infusion, 50 mL/hr, Intravenous, Continuous, Wally Borja MD, Last Rate: 50 mL/hr at 06/03/25 0125, 50 mL/hr at 06/03/25 0125    traZODone (DESYREL) tablet 50 mg, 50 mg, Oral, Nightly, Wally Borja MD, 50 mg at 06/03/25 0120    Vancomycin HCl 1,250 mg in sodium chloride 0.9 % 250 mL VTB, 1,250 mg, Intravenous, Q12H, Carlos Colon, [facility]    Please refer to the medical record for a full medication list    Review of Systems:    Constitutional-- No Fever, chills or sweats.  Appetite good, and no malaise. No fatigue.  HEENT-- No new vision, hearing or throat complaints.  No epistaxis or oral  "sores.  Denies odynophagia or dysphagia.  No odynophagia or dysphagia. No headache, photophobia or neck stiffness.  CV-- No chest pain, palpitation or syncope  Resp-- No SOB/cough/Hemoptysis  GI- No nausea, vomiting, or diarrhea.  No hematochezia, melena, or hematemesis. Denies jaundice or chronic liver disease.  -- No dysuria, hematuria, or flank pain.  Denies hesitancy, urgency or flank pain.  Lymph- no swollen lymph nodes in neck/axilla or groin.   Heme- No active bruising or bleeding;   MS--   Right arm and elbow swelling, redness, and pain.  Otherwise, no swelling or pain in the bones or joints of arms/legs.  No new back pain.  Neuro-- No acute focal weakness or numbness in the arms or legs.  No seizures.  Skin--No rashes or lesions    Physical Exam:   Vital Signs   Temp:  [98.1 °F (36.7 °C)-100 °F (37.8 °C)] 98.8 °F (37.1 °C)  Heart Rate:  [] 113  Resp:  [18-20] 18  BP: (108-131)/(61-82) 120/79    Temp  Min: 98.1 °F (36.7 °C)  Max: 100 °F (37.8 °C)  BP  Min: 108/61  Max: 131/82  Pulse  Min: 57  Max: 113  Resp  Min: 18  Max: 20  SpO2  Min: 90 %  Max: 97 %    Blood pressure 120/79, pulse 113, temperature 98.8 °F (37.1 °C), temperature source Oral, resp. rate 18, height 177.8 cm (70\"), weight 113 kg (250 lb), SpO2 93%.  GENERAL: Awake and alert, in no acute distress. In bedside recliner  HEENT:  Normocephalic, atraumatic.  Oropharynx without thrush.  no external oral lesions noted. Ears externally normal, Nose externally normal.  EYES: PERRL. No conjunctival injection. No icterus.   HEART:  tachycardia, regular, no murmur  LUNGS: Clear to auscultation and percussion. No respiratory distress, no use of accessory muscles.  ABDOMEN: Soft, nontender, nondistended. No appreciable HSM.  Bowel sounds normal.  MSK: right forearm and hand with significant swelling with some erythema and fluctuance noted over right elbow extending to forearm and upper arm. No purulent drainage noted on exam.   GENITAL: no Thomas " "catheter  SKIN: no generalized rashes.  No peripheral stigmata of infective endocarditis noted.  PSYCHIATRIC: cooperative with exam. Agitated mood. Insight seems poor  NEURO: awake alert and oriented ×4.  Normal speech and cognition    Results Review:   I reviewed the patient's new clinical results.  I reviewed the patient's new imaging results and agree with the interpretation.  I reviewed the patient's other test results and agree with the interpretation    Results from last 7 days   Lab Units 06/03/25  0452 06/02/25  2106   WBC 10*3/mm3 11.66* 13.37*   HEMOGLOBIN g/dL 13.5 14.4   HEMATOCRIT % 41.8 43.0   PLATELETS 10*3/mm3 152 191     Results from last 7 days   Lab Units 06/03/25  0452   SODIUM mmol/L 135*   POTASSIUM mmol/L 3.5   CHLORIDE mmol/L 104   CO2 mmol/L 24.0   BUN mg/dL 8.2   CREATININE mg/dL 0.75*   GLUCOSE mg/dL 136*   CALCIUM mg/dL 7.7*     Results from last 7 days   Lab Units 06/02/25  2106   ALK PHOS U/L 58   BILIRUBIN mg/dL 0.4   ALT (SGPT) U/L 39   AST (SGOT) U/L 40     Results from last 7 days   Lab Units 06/02/25  2106   SED RATE mm/hr 62*     Results from last 7 days   Lab Units 06/02/25  2106   CRP mg/dL 13.48*         Results from last 7 days   Lab Units 06/02/25  2106   LACTATE mmol/L 1.4     Estimated Creatinine Clearance: 122 mL/min (A) (by C-G formula based on SCr of 0.75 mg/dL (L)).     Procalitonin Results:      Lab 06/02/25 2106   PROCALCITONIN 0.09      Brief Urine Lab Results  (Last result in the past 365 days)        Color   Clarity   Blood   Leuk Est   Nitrite   Protein   CREAT   Urine HCG        06/16/24 1621 Yellow   Clear   Negative   Negative   Negative   Negative                  No results found for: \"SITE\", \"ALLENTEST\", \"PHART\", \"WRS8DQM\", \"PO2ART\", \"CMU2YAE\", \"BASEEXCESS\", \"W5EDGGEP\", \"HGBBG\", \"HCTABG\", \"OXYHEMOGLOBI\", \"METHHGBN\", \"CARBOXYHGB\", \"CO2CT\", \"BAROMETRIC\", \"MODALITY\", \"FIO2\"     Microbiology:   Blood cultures-in progress    Radiology:  Imaging Results (Last 72 " Hours)       Procedure Component Value Units Date/Time    XR Elbow 2 View Right [[id number]] Collected: 06/02/25 2302     Updated: 06/02/25 2308    Narrative:      XR ELBOW 2 VW RIGHT    Date of Exam: 6/2/2025 10:59 PM EDT    Indication: erythema pain, swelling    Comparison: None available.    Findings:  Bones of the right elbow joint appear anatomically aligned and intact. A small well-defined, benign-appearing calcification is seen adjacent the medial epicondyle, which appears chronic, not reflecting acute trauma. Lateral view shows a mildly displaced   anterior fat pad consistent with small elbow joint effusion. There is also generalized reticulation of the subcutaneous fat, particularly along the dorsal and ulnar margins. No foreign body or soft tissue gas is seen.          Impression:      Impression:    1. Subcutaneous fat stranding about the elbow whether edema or whether related to cellulitis. No evidence of foreign body.    2. Small elbow joint effusion.    3. No evidence of acute or healing or bony trauma of the right elbow.      Electronically Signed: Tato Alvares MD    6/2/2025 11:05 PM EDT    Workstation ID: [id number]        MRI right elbow 6/3/25:  1.There is a large rim-enhancing subcutaneous collection at the olecranon bursa. There is extensive soft tissue edema. Abscess is favored over a bland bursitis.. There is no sinus tract to the skin surface or joint.  2.There is soft tissue edema and enhancement in the anterior elbow. Cellulitis with possible developing myositis.  3.There is mild arthritis of the elbow with small joint effusion.    IMPRESSION:     Problems:   right elbow and forearm cellulitis/right olecranon bursitis/abscess-MRI of the right upper extremity with large rim-enhancing collection at olecranon bursa with abscess favored over bland bursitis per radiology.  The patient did have a significant elevation of his inflammatory markers and a leukocytosis on arrival.  Orthopedic surgery is  following and planning for I&D procedure soon.  Currently on IV antibiotics. Most likely culprit is staph aureus but streptococcus is also possible. GNR infection and anaerobes seem less likely. Note that MRSA PCR nares was negative but this does not rule out MRSA as a culprit for his infection.   leukocytosis with neutrophilia-present on arrival. improving.  Suspect this is due to #1   Syncopal episode-etiology unclear but could be due to sepsis.  Blood cultures in progress.  He is currently normotensive   diabetes mellitus type 2-well-controlled with an A1c of 5.9   chronic heart failure with reduced ejection fraction- LVEF was 51-55% on TTE on 5/2/24. Diastolic function was normal   Chronic hypertension    RECOMMENDATIONS:    - continue to monitor CBC, CMP, CRP  -  follow-up pending blood cultures  -  follow-up pending MRI of the right upper extremity  -  Dr. Appiah with orthopedic surgery is following. Plan for OR tomorrow for I&D procedure   -   agree with IV vancomycin and ceftriaxone for now. Will follow culture results from I&D procedure for final antibiotic plan    I discussed the patient's complex case with Dr. Hdz today    Thank you for asking me to see Alejo Condon.  Our group would be pleased to follow this patient over the course of their hospitalization and assist with outpatient antimicrobial therapy, as indicated.  Further recommendations depend on the results of the cultures and clinical course.    This visit included the following complex service elements:  Complex medical decision-making associated with antimicrobial prescribing.  In-depth chart review with high level synthesis for complex diagnoses.     Joby Paz MD  6/3/2025

## 2025-06-03 NOTE — PROGRESS NOTES
"Pharmacy Consult - Vancomycin Dosing and Monitoring    Alejo Condon is a 65 y.o. male receiving vancomycin therapy.     Indication: Cellulitis of right upper extremity   Consulting Provider:  Dr Borja  ID Consult:     Goal AUC: 400-600 mg/L*hr    Current Antimicrobial Therapy  Anti-Infectives (From admission, onward)      Ordered     Dose/Rate Route Frequency Start Stop    06/03/25 0029  cefTRIAXone (ROCEPHIN) 2,000 mg in sodium chloride 0.9 % 100 mL MBP        Ordering Provider: Carlos Colon, RPH    2,000 mg  200 mL/hr over 30 Minutes Intravenous Every 24 Hours 06/03/25 2100 06/07/25 2059    06/03/25 0130  Vancomycin HCl 1,250 mg in sodium chloride 0.9 % 250 mL VTB        Ordering Provider: Carlos Colon RPH    1,250 mg  200 mL/hr over 75 Minutes Intravenous Every 12 Hours 06/03/25 1000 06/08/25 0959    06/03/25 0006  Pharmacy to dose vancomycin        Ordering Provider: Wally Borja MD     Not Applicable Continuous PRN 06/03/25 0005      06/02/25 1900  vancomycin 2250 mg/500 mL 0.9% NS IVPB ([facility])        Ordering Provider: Jose Trinidad DO    20 mg/kg × 113 kg  over 90 Minutes Intravenous Once 06/02/25 2215 06/03/25 0044    06/02/25 1900  cefTRIAXone (ROCEPHIN) 2,000 mg in sodium chloride 0.9 % 100 mL MBP        Ordering Provider: Jose Trinidad DO    2,000 mg  200 mL/hr over 30 Minutes Intravenous Once 06/02/25 2130 06/02/25 2224          Allergies  Allergies as of 06/02/2025    (No Known Allergies)     Labs  Results from last 7 days   Lab Units 06/02/25  2106   BUN mg/dL 8.9   CREATININE mg/dL 0.90     Results from last 7 days   Lab Units 06/02/25  2106   WBC 10*3/mm3 13.37*     Evaluation of Dosing     Last Dose Received in the ED/Outside Facility:  Vancomycin 2250 mg IV x 1  Is Patient on Dialysis or Renal Replacement:     Height - 177.8 cm (70\")  Weight - 113 kg (250 lb)    Estimated Creatinine Clearance: 101.6 mL/min (by C-G formula based on SCr of 0.9 mg/dL).    No intake/output data " recorded.    Microbiology and Radiology  Microbiology Results (last 10 days)       ** No results found for the last 240 hours. **          Reported Vancomycin Levels              InsightRX AUC Calculation:    Current AUC: 0 mg/L*hr    Predicted Steady State AUC on Current Dose: 543 mg/L*hr (1250 mg Q12H)  _________________________________    Predicted Steady State AUC on New Dose:  mg/L*hr    Assessment/Plan:   Vancomycin 2250 mg IV x 1, then Vancomycin 1250 mg IV Q12H (11 mg/kg/dose)  Vancomycin level before 4th dose (06:00 level before 10:00 dose Wed 6/4)  MRSA Screen PCR ordered  BMP x 2 days  Pharmacy to follow    Carlos Colon RPH  6/3/2025  01:37 EDT

## 2025-06-03 NOTE — PROGRESS NOTES
Pharmacy consult to continue Ceftriaxone  Cellulitis of right upper extremity   BMI = 35.87    Plan: Ceftriaxone 2 Gm IV Q24H x 5 days    Carlos Colon [facility]  6/3/25 00:30

## 2025-06-03 NOTE — ED NOTES
Alejo Condon    Nursing Report ED to Floor:  Mental status: alert and oriented x4  Ambulatory status: sb  Oxygen Therapy:  2l nc   Cardiac Rhythm: sinus tach  Admitted from: ed/home  Safety Concerns:  fall risk  Precautions: na  Social Issues: na  ED Room #:  [id number]    ED Nurse Phone Extension - [phone] or may call [phone].      HPI:   Chief Complaint   Patient presents with    Wound Infection       Past Medical History:  Past Medical History:   Diagnosis Date    Acute systolic CHF (congestive heart failure) 05/13/2020    Echo 5/6/20 LVEF = 31.0%. Left ventricular diastolic dysfunction is noted (grade III w/high LAP) consistent with reversible restrictive pattern Left atrial volume is moderately increased.    Diverticulitis     Elevated cholesterol     Hyperlipidemia     Hypertension     Opiate addiction     Resolved since on suboxone    R/o Asthma 10/29/2024    R/O FELIPA (obstructive sleep apnea) 8/1/2024        Past Surgical History:  Past Surgical History:   Procedure Laterality Date    ANAL FISTULA REPAIR N/A     CARDIAC CATHETERIZATION N/A 08/25/2020    Procedure: Left Heart Cath;  Surgeon: Garrett Trejo MD;  Location:  [facility];  Service: Cardiovascular;  Laterality: N/A;    COLONOSCOPY      COLONOSCOPY N/A 9/12/2023    Procedure: COLONOSCOPY FOR SCREENING;  Surgeon: Crystal Rodriguez MD;  Location: [facility] OR;  Service: Gastroenterology;  Laterality: N/A;    ENDOSCOPY      ENDOSCOPY N/A 9/12/2023    Procedure: ESOPHAGOGASTRODUODENOSCOPY WITH BIOPSY;  Surgeon: Crystal Rodriguez MD;  Location:  [facility] OR;  Service: Gastroenterology;  Laterality: N/A;    FINGER SURGERY      INTERVENTIONAL RADIOLOGY PROCEDURE N/A 8/15/2024    Procedure: Pulmonary Angiogram and Embolization;  Surgeon: Jean Paul Garcia MD;  Location:  [facility];  Service: Interventional Radiology;  Laterality: N/A;    REFRACTIVE SURGERY      RETINAL DETACHMENT REPAIR  07/2021        Admitting  Doctor:   Wally Borja MD    Consulting Provider(s):  Consults       Date and Time Order Name Status Description    6/2/2025 11:20 PM Inpatient Orthopedic Surgery Consult               Admitting Diagnosis:   The primary encounter diagnosis was Cellulitis of right upper extremity. A diagnosis of Olecranon bursitis of right elbow was also pertinent to this visit.    Most Recent Vitals:   Vitals:    06/02/25 2200 06/02/25 2230 06/02/25 2300 06/02/25 2330   BP: 113/74 119/78 110/76 124/80   Patient Position:       Pulse: 92 91 86 91   Resp:       Temp:       TempSrc:       SpO2: 91% 92% 94% 95%   Weight:       Height:           Active LDAs/IV Access:   Lines, Drains & Airways       Active LDAs       Name Placement date Placement time Site Days    Peripheral IV 06/02/25 2106 20 G Left Antecubital 06/02/25 2106  Antecubital  less than 1                    Labs (abnormal labs have a star):   Labs Reviewed   COMPREHENSIVE METABOLIC PANEL - Abnormal; Notable for the following components:       Result Value    Glucose 189 (*)     Sodium 133 (*)     Calcium 8.2 (*)     All other components within normal limits    Narrative:     GFR Categories in Chronic Kidney Disease (CKD)              GFR Category          GFR (mL/min/1.73)    Interpretation  G1                    90 or greater        Normal or high (1)  G2                    60-89                Mild decrease (1)  G3a                   45-59                Mild to moderate decrease  G3b                   30-44                Moderate to severe decrease  G4                    15-29                Severe decrease  G5                    14 or less           Kidney failure    (1)In the absence of evidence of kidney disease, neither GFR category G1 or G2 fulfill the criteria for CKD.    eGFR calculation 2021 CKD-EPI creatinine equation, which does not include race as a factor   CBC WITH AUTO DIFFERENTIAL - Abnormal; Notable for the following components:    WBC 13.37 (*)      "Neutrophil % 81.9 (*)     Lymphocyte % 8.9 (*)     Eosinophil % 0.1 (*)     Neutrophils, Absolute 10.94 (*)     Monocytes, Absolute 1.15 (*)     All other components within normal limits   SEDIMENTATION RATE - Abnormal; Notable for the following components:    Sed Rate 62 (*)     All other components within normal limits   C-REACTIVE PROTEIN - Abnormal; Notable for the following components:    C-Reactive Protein 13.48 (*)     All other components within normal limits   LACTIC ACID, PLASMA - Normal   PROCALCITONIN - Normal    Narrative:     As a Marker for Sepsis (Non-Neonates):    1. <0.5 ng/mL represents a low risk of severe sepsis and/or septic shock.  2. >2 ng/mL represents a high risk of severe sepsis and/or septic shock.    As a Marker for Lower Respiratory Tract Infections that require antibiotic therapy:    PCT on Admission    Antibiotic Therapy       6-12 Hrs later    >0.5                Strongly Recommended  >0.25 - <0.5        Recommended   0.1 - 0.25          Discouraged              Remeasure/reassess PCT  <0.1                Strongly Discouraged     Remeasure/reassess PCT    As 28 day mortality risk marker: \"Change in Procalcitonin Result\" (>80% or <=80%) if Day 0 (or Day 1) and Day 4 values are available. Refer to http://[email or web address]-pct-calculator.[email or web address]    Change in PCT <=80%  A decrease of PCT levels below or equal to 80% defines a positive change in PCT test result representing a higher risk for 28-day all-cause mortality of patients diagnosed with severe sepsis for septic shock.    Change in PCT >80%  A decrease of PCT levels of more than 80% defines a negative change in PCT result representing a lower risk for 28-day all-cause mortality of patients diagnosed with severe sepsis or septic shock.      BLOOD CULTURE   BLOOD CULTURE   MRSA SCREEN, PCR   CBC AND DIFFERENTIAL    Narrative:     The following orders were created for panel order CBC & Differential.  Procedure                               " Abnormality         Status                     ---------                               -----------         ------                     CBC Auto Differential[[id number]]        Abnormal            Final result                 Please view results for these tests on the individual orders.       Meds Given in ED:   Medications   vancomycin 2250 mg/500 mL 0.9% NS IVPB ([facility]) (2,250 mg Intravenous New Bag 6/2/25 2224)   HYDROmorphone (DILAUDID) injection 0.5 mg (0.5 mg Intravenous Given 6/2/25 2147)   sepsis fluid NS 0.9 % bolus 2,670 mL (2,670 mL Intravenous New Bag 6/2/25 2322)   HYDROmorphone (DILAUDID) injection 1 mg (has no administration in time range)   cefTRIAXone (ROCEPHIN) 2,000 mg in sodium chloride 0.9 % 100 mL MBP (0 mg Intravenous Stopped 6/2/25 2224)   ondansetron (ZOFRAN) injection 4 mg (4 mg Intravenous Given 6/2/25 2147)           Last NIH score:                                                          Dysphagia screening results:        Kervin Coma Scale:  No data recorded     CIWA:        Restraint Type:            Isolation Status:  No active isolations

## 2025-06-03 NOTE — CONSULTS
Orthopaedic Consult Note    Patient Care Team:  Provider, No Known as PCP - General  Garrett Trejo MD as Consulting Physician (Cardiology)  Annelise Summers PA-C as Physician Assistant (Cardiology)  Uday Ruelas MD as Emergency Attending (Pulmonary Disease)  Jean Paul Garcia MD as Consulting Physician (Neurosurgery)    Chief complaint  Right elbow pain    Subjective .     History of present illness:    Alejo Condon is a 65 year old male with a PMH of type II diabetes, HLD, anxiety who presented to the emergency department with a complaint of right elbow pain and redness that began two weeks ago.  This began without injury or trauma but has been progressively worsening with time.  He presented to the emergency department 2 days ago but left without being seen.  He was evaluated at an urgent care and was recommended to follow-up in the emergency department.  He reports that his symptoms continue to worsen with time.  He has not been on any prior antibiotics.  He does report that there was a small scab to the posterior aspect of his elbow but he denies any drainage injury or trauma.  He denies any numbness or tingling.  He does report having a near syncopal episode prior to arrival at the urgent care.  Otherwise no other complaints or concerns.    Review of Systems:    All systems reviewed are negative except for what is stated in the HPI     History  Family History   Problem Relation Age of Onset    Hypertension Mother     Hyperlipidemia Mother     Heart failure Mother     Heart attack Father 88    Cardiomyopathy Brother 36        Patient reports he had SCD that they attributed to agent orange     Social History     Socioeconomic History    Marital status:    Tobacco Use    Smoking status: Former     Average packs/day: 0.3 packs/day for 20.0 years (5.0 ttl pk-yrs)     Types: Cigarettes     Start date: 2023     Quit date: 1967     Years since quittin.5      "Passive exposure: Past    Smokeless tobacco: Never    Tobacco comments:     on and off since he was 6 years old   Vaping Use    Vaping status: Never Used   Substance and Sexual Activity    Alcohol use: Yes     Alcohol/week: 2.0 standard drinks of alcohol     Types: 2 Cans of beer per week     Comment: \" I have had 6 beers in the last month\"    Drug use: Not Currently     Types: Marijuana, Oxycodone     Comment: last smoked about 2/2023    Sexual activity: Defer     Past Surgical History:   Procedure Laterality Date    ANAL FISTULA REPAIR N/A     CARDIAC CATHETERIZATION N/A 08/25/2020    Procedure: Left Heart Cath;  Surgeon: Garrett Trejo MD;  Location:  [facility];  Service: Cardiovascular;  Laterality: N/A;    COLONOSCOPY      COLONOSCOPY N/A 9/12/2023    Procedure: COLONOSCOPY FOR SCREENING;  Surgeon: Crystal Rodriguez MD;  Location: [facility] OR;  Service: Gastroenterology;  Laterality: N/A;    ENDOSCOPY      ENDOSCOPY N/A 9/12/2023    Procedure: ESOPHAGOGASTRODUODENOSCOPY WITH BIOPSY;  Surgeon: Crystal Rodriguez MD;  Location:  [facility] OR;  Service: Gastroenterology;  Laterality: N/A;    FINGER SURGERY      INTERVENTIONAL RADIOLOGY PROCEDURE N/A 8/15/2024    Procedure: Pulmonary Angiogram and Embolization;  Surgeon: Jean Paul Garcia MD;  Location:  [facility];  Service: Interventional Radiology;  Laterality: N/A;    REFRACTIVE SURGERY      RETINAL DETACHMENT REPAIR  07/2021     Past Medical History:   Diagnosis Date    Acute systolic CHF (congestive heart failure) 05/13/2020    Echo 5/6/20 LVEF = 31.0%. Left ventricular diastolic dysfunction is noted (grade III w/high LAP) consistent with reversible restrictive pattern Left atrial volume is moderately increased.    Diverticulitis     Elevated cholesterol     Hyperlipidemia     Hypertension     Opiate addiction     Resolved since on suboxone    R/o Asthma 10/29/2024    R/O FELIPA (obstructive sleep apnea) 8/1/2024 "     No Known Allergies    Current Facility-Administered Medications:     [START ON 6/4/2025] ! Vancomycin level before 10:00 dose on Wednesday 6/4/25; hold dose until level checked by pharmacist, , Not Applicable, Once, Carlos Colon, [facility]    atorvastatin (LIPITOR) tablet 20 mg, 20 mg, Oral, Nightly, Wally Borja MD    sennosides-docusate (PERICOLACE) 8.6-50 MG per tablet 2 tablet, 2 tablet, Oral, BID PRN **AND** polyethylene glycol (MIRALAX) packet 17 g, 17 g, Oral, Daily PRN **AND** bisacodyl (DULCOLAX) EC tablet 5 mg, 5 mg, Oral, Daily PRN **AND** bisacodyl (DULCOLAX) suppository 10 mg, 10 mg, Rectal, Daily PRN, Wally Borja MD    Calcium Replacement - Follow Nurse / BPA Driven Protocol, , Not Applicable, PRN, Wally Borja MD    cefTRIAXone (ROCEPHIN) 2,000 mg in sodium chloride 0.9 % 100 mL MBP, 2,000 mg, Intravenous, Q24H, Carlos Colon, [facility]    dextrose (D50W) (25 g/50 mL) IV injection 25 g, 25 g, Intravenous, Q15 Min PRN, Wally Borja MD    dextrose (GLUTOSE) oral gel 15 g, 15 g, Oral, Q15 Min PRN, Wally Borja MD    glucagon (GLUCAGEN) injection 1 mg, 1 mg, Intramuscular, Q15 Min PRN, Wally Borja MD    HYDROcodone-acetaminophen (NORCO) 5-325 MG per tablet 1 tablet, 1 tablet, Oral, Q6H PRN, Wally Borja MD    HYDROmorphone (DILAUDID) injection 0.5 mg, 0.5 mg, Intravenous, Q2H PRN, Wally Borja MD    Insulin Lispro (humaLOG) injection 2-7 Units, 2-7 Units, Subcutaneous, 4x Daily AC & at Bedtime, Wally Borja MD    Magnesium Standard Dose Replacement - Follow Nurse / BPA Driven Protocol, , Not Applicable, PRN, Wally Borja MD    ondansetron (ZOFRAN) injection 4 mg, 4 mg, Intravenous, Q6H PRN, Wally Borja MD    Pharmacy to dose vancomycin, , Not Applicable, Continuous PRN, Wally Borja MD    Phosphorus Replacement - Follow Nurse / BPA Driven Protocol, , Not Applicable, PRNJonh Wycliffe, MD    Potassium Replacement - Follow Nurse / BPA Driven Protocol, , Not Applicable,  PRN, Wally Borja MD    sodium chloride 0.9 % flush 10 mL, 10 mL, Intravenous, Q12H, Wally Borja MD, 10 mL at 06/03/25 0115    sodium chloride 0.9 % flush 10 mL, 10 mL, Intravenous, PRN, Wally Borja MD    sodium chloride 0.9 % infusion 40 mL, 40 mL, Intravenous, PRN, Wally Borja MD    sodium chloride 0.9 % infusion, 50 mL/hr, Intravenous, Continuous, Wally Borja MD, Last Rate: 50 mL/hr at 06/03/25 0125, 50 mL/hr at 06/03/25 0125    traZODone (DESYREL) tablet 50 mg, 50 mg, Oral, Nightly, Wally Borja MD, 50 mg at 06/03/25 0120    Vancomycin HCl 1,250 mg in sodium chloride 0.9 % 250 mL VTB, 1,250 mg, Intravenous, Q12H, Carlos Colon [facility]    Objective     Vital Signs   Temp:  [98.1 °F (36.7 °C)-100 °F (37.8 °C)] 98.1 °F (36.7 °C)  Heart Rate:  [] 103  Resp:  [18-20] 18  BP: (108-131)/(61-82) 131/82    Physical Exam:  General: NAD, alert and oriented to self  Head/Neck: Atraumatic  Respiratory: Breathing nonlabored  CV: palpable radial pulse  Extremity: Examination of the right upper extremity demonstrates diffuse mild to moderate swelling noted throughout the upper extremity including the forearm and hand.  Compartments are soft and compressible.  Mild erythema throughout the posterior medial aspect of the arm and forearm.  There is no obvious olecranon bursa fluid collection.  No palpable fluid collections.  No open skin lesions or abrasions noted posteriorly.  There is no palpable effusion.  There is no pain with short arc elbow flexion and extension, pronosupination.  Full range of motion limited secondary to pain.  Sensations intact light touch throughout the axillary, median, radial, ulnar nerve distributions.  Motor intact to AIN, PIN, radial, ulnar nerves.  Well-perfused distally.    Labs:  Lab Results (last 24 hours)       Procedure Component Value Units Date/Time    CBC Auto Differential [[id number]]  (Abnormal) Collected: 06/03/25 0452    Specimen: Blood Updated: 06/03/25 0518      WBC 11.66 10*3/mm3      RBC 4.47 10*6/mm3      Hemoglobin 13.5 g/dL      Hematocrit 41.8 %      MCV 93.5 fL      MCH 30.2 pg      MCHC 32.3 g/dL      RDW 13.7 %      RDW-SD 47.3 fl      MPV 10.9 fL      Platelets 152 10*3/mm3      Neutrophil % 74.7 %      Lymphocyte % 14.8 %      Monocyte % 9.5 %      Eosinophil % 0.4 %      Basophil % 0.3 %      Immature Grans % 0.3 %      Neutrophils, Absolute 8.71 10*3/mm3      Lymphocytes, Absolute 1.72 10*3/mm3      Monocytes, Absolute 1.11 10*3/mm3      Eosinophils, Absolute 0.05 10*3/mm3      Basophils, Absolute 0.03 10*3/mm3      Immature Grans, Absolute 0.04 10*3/mm3      nRBC 0.0 /100 WBC     Basic Metabolic Panel [[id number]] Collected: 06/03/25 0452    Specimen: Blood Updated: 06/03/25 0504    Hemoglobin A1c [[id number]]  (Abnormal) Collected: 06/02/25 2106    Specimen: Blood Updated: 06/03/25 0254     Hemoglobin A1C 5.90 %     Narrative:      Hemoglobin A1C Ranges:    Increased Risk for Diabetes  5.7% to 6.4%  Diabetes                     >= 6.5%  Diabetic Goal                < 7.0%    MRSA Screen, PCR (Inpatient) - Swab, Nares [[id number]] Collected: 06/02/25 2130    Specimen: Swab from Nares Updated: 06/02/25 2254    Blood Culture - Blood, Arm, Left [[id number]] Collected: 06/02/25 0925    Specimen: Blood from Arm, Left Updated: 06/02/25 2248    Blood Culture - Blood, Hand, Left [[id number]] Collected: 06/02/25 0925    Specimen: Blood from Hand, Left Updated: 06/02/25 2248    Procalcitonin [[id number]]  (Normal) Collected: 06/02/25 2106    Specimen: Blood Updated: 06/02/25 2155     Procalcitonin 0.09 ng/mL     Narrative:      As a Marker for Sepsis (Non-Neonates):    1. <0.5 ng/mL represents a low risk of severe sepsis and/or septic shock.  2. >2 ng/mL represents a high risk of severe sepsis and/or septic shock.    As a Marker for Lower Respiratory Tract Infections that require antibiotic therapy:    PCT on Admission    Antibiotic Therapy       6-12 Hrs later    >0.5         "        Strongly Recommended  >0.25 - <0.5        Recommended   0.1 - 0.25          Discouraged              Remeasure/reassess PCT  <0.1                Strongly Discouraged     Remeasure/reassess PCT    As 28 day mortality risk marker: \"Change in Procalcitonin Result\" (>80% or <=80%) if Day 0 (or Day 1) and Day 4 values are available. Refer to http://[email or web address] [place]-pct-calculator.com    Change in PCT <=80%  A decrease of PCT levels below or equal to 80% defines a positive change in PCT test result representing a higher risk for 28-day all-cause mortality of patients diagnosed with severe sepsis for septic shock.    Change in PCT >80%  A decrease of PCT levels of more than 80% defines a negative change in PCT result representing a lower risk for 28-day all-cause mortality of patients diagnosed with severe sepsis or septic shock.       Comprehensive Metabolic Panel [[id number]]  (Abnormal) Collected: 06/02/25 2106    Specimen: Blood Updated: 06/02/25 2152     Glucose 189 mg/dL      BUN 8.9 mg/dL      Creatinine 0.90 mg/dL      Sodium 133 mmol/L      Potassium 3.7 mmol/L      Comment: Slight hemolysis detected by analyzer. Result may be falsely elevated.        Chloride 98 mmol/L      CO2 24.0 mmol/L      Calcium 8.2 mg/dL      Total Protein 6.2 g/dL      Albumin 3.5 g/dL      ALT (SGPT) 39 U/L      AST (SGOT) 40 U/L      Comment: Slight hemolysis detected by analyzer. Result may be falsely elevated.        Alkaline Phosphatase 58 U/L      Total Bilirubin 0.4 mg/dL      Globulin 2.7 gm/dL      Comment: Calculated Result        A/G Ratio 1.3 g/dL      BUN/Creatinine Ratio 9.9     Anion Gap 11.0 mmol/L      eGFR 94.2 mL/min/1.73     Narrative:      GFR Categories in Chronic Kidney Disease (CKD)              GFR Category          GFR (mL/min/1.73)    Interpretation  G1                    90 or greater        Normal or high (1)  G2                    60-89                Mild decrease (1)  G3a                   45-59           "      Mild to moderate decrease  G3b                   30-44                Moderate to severe decrease  G4                    15-29                Severe decrease  G5                    14 or less           Kidney failure    (1)In the absence of evidence of kidney disease, neither GFR category G1 or G2 fulfill the criteria for CKD.    eGFR calculation 2021 CKD-EPI creatinine equation, which does not include race as a factor    Lactic Acid, Plasma [[id number]]  (Normal) Collected: 06/02/25 2106    Specimen: Blood Updated: 06/02/25 2151     Lactate 1.4 mmol/L      Comment: Falsely depressed results may occur on samples drawn from patients receiving N-Acetylcysteine (NAC) or Metamizole.       C-reactive Protein [[id number]]  (Abnormal) Collected: 06/02/25 2106    Specimen: Blood Updated: 06/02/25 2151     C-Reactive Protein 13.48 mg/dL     Sedimentation Rate [[id number]]  (Abnormal) Collected: 06/02/25 2106    Specimen: Blood Updated: 06/02/25 2134     Sed Rate 62 mm/hr     CBC & Differential [[id number]]  (Abnormal) Collected: 06/02/25 2106    Specimen: Blood Updated: 06/02/25 2124    Narrative:      The following orders were created for panel order CBC & Differential.  Procedure                               Abnormality         Status                     ---------                               -----------         ------                     CBC Auto Differential[[id number]]        Abnormal            Final result                 Please view results for these tests on the individual orders.    CBC Auto Differential [[id number]]  (Abnormal) Collected: 06/02/25 2106    Specimen: Blood Updated: 06/02/25 2124     WBC 13.37 10*3/mm3      RBC 4.75 10*6/mm3      Hemoglobin 14.4 g/dL      Hematocrit 43.0 %      MCV 90.5 fL      MCH 30.3 pg      MCHC 33.5 g/dL      RDW 13.7 %      RDW-SD 46.2 fl      MPV 11.0 fL      Platelets 191 10*3/mm3      Neutrophil % 81.9 %      Lymphocyte % 8.9 %      Monocyte % 8.6 %      Eosinophil % 0.1 %       Basophil % 0.2 %      Immature Grans % 0.3 %      Neutrophils, Absolute 10.94 10*3/mm3      Lymphocytes, Absolute 1.19 10*3/mm3      Monocytes, Absolute 1.15 10*3/mm3      Eosinophils, Absolute 0.02 10*3/mm3      Basophils, Absolute 0.03 10*3/mm3      Immature Grans, Absolute 0.04 10*3/mm3      nRBC 0.0 /100 WBC             Imaging:  Right elbow xrays obtained on 6/2/25 were personally reviewed and interpreted by myself. No acute fractures. Soft tissue swelling and subcutaneous fat stranding    Assessment & Plan   Alejo Condon is a 65 year old male with right elbow/forearm cellulitis.    I discussed with the patient their clinical and radiographic findings demonstrate right elbow/forearm cellulitis.  We had a lengthy discussion regarding the pathophysiology of their diagnosis.  I do not appreciate any palpable fluid collections on today's exam but there is diffuse swelling and erythema more consistent with cellulitis.  I would like to obtain a stat MRI for evaluation of any identifiable fluid collections large enough for I&D.  He will remain n.p.o. until the MRI results are reviewed.  Recommend continued IV antibiotics and ID consult.  I anticipate the patient's symptoms will improve with a course of IV antibiotics alone.  Encouraged hand wrist and digit range of motion and elevation for edema control.  The patient expressed understanding.  They were agreeable with the plan.  All questions and concerns were addressed.    - Weight bearing status: WBAT RUE, digit range of motion encouraged  - Pain control: Oral and IV per medicine  - DVT PPX: SCUDs  - ABX: Vancomycin and Rocephin. ID consult placed per medicine.  - Diet: NPO until MRI results are reviewed  - Medical management and optimization   - Follow up MRI results and monitor response to antibiotics.     I discussed the patients findings and my recommendations with patient      Cellulitis of right upper extremity    Mixed hyperlipidemia    Acute systolic  CHF (congestive heart failure)    Anxiety    Type 2 diabetes mellitus with hyperglycemia    Olecranon bursitis of right elbow      Saige Camejo MD  06/03/25  05:23 EDT

## 2025-06-03 NOTE — PLAN OF CARE
Problem: Adult Inpatient Plan of Care  Goal: Plan of Care Review  Outcome: Progressing  Goal: Patient-Specific Goal (Individualized)  Outcome: Progressing  Goal: Absence of Hospital-Acquired Illness or Injury  Outcome: Progressing  Intervention: Identify and Manage Fall Risk  Recent Flowsheet Documentation  Taken 6/3/2025 0400 by Courtney Domínguez RN  Safety Promotion/Fall Prevention:   safety round/check completed   fall prevention program maintained   assistive device/personal items within reach  Taken 6/3/2025 0200 by Courtney Domínguez RN  Safety Promotion/Fall Prevention: safety round/check completed  Intervention: Prevent Skin Injury  Recent Flowsheet Documentation  Taken 6/3/2025 0400 by Courtney Domínguez RN  Body Position: position changed independently  Skin Protection:   transparent dressing maintained   incontinence pads utilized  Taken 6/3/2025 0200 by Courtney Domínguez RN  Body Position: position changed independently  Skin Protection:   incontinence pads utilized   transparent dressing maintained  Taken 6/3/2025 0100 by Courtney Domínguez RN  Skin Protection:   transparent dressing maintained   incontinence pads utilized  Intervention: Prevent Infection  Recent Flowsheet Documentation  Taken 6/3/2025 0400 by Courtney Domínguez RN  Infection Prevention:   single patient room provided   environmental surveillance performed   rest/sleep promoted  Taken 6/3/2025 0200 by Courtney Domínguez RN  Infection Prevention: environmental surveillance performed  Goal: Optimal Comfort and Wellbeing  Outcome: Progressing  Intervention: Provide Person-Centered Care  Recent Flowsheet Documentation  Taken 6/3/2025 0100 by Courtney Domínguez RN  Trust Relationship/Rapport:   care explained   choices provided  Goal: Readiness for Transition of Care  Outcome: Progressing     Problem: Comorbidity Management  Goal: Blood Pressure in Desired Range  Outcome: Progressing

## 2025-06-04 NOTE — SIGNIFICANT NOTE
Patient noted to have a visitor present in room. Visitor exited building and appeared to be speaking to the wall with erratic behavior. Security notified of potential issue and asked to investigate. Security is in agreement that the visitor should leave grounds. Patient notified and decided to leave AMA and refused to sign form. Educated patient to seek further medical care. GAGAN Reed notified.

## 2025-06-07 LAB
BACTERIA SPEC AEROBE CULT: NORMAL
BACTERIA SPEC AEROBE CULT: NORMAL

## (undated) DEVICE — THE BITE BLOCK MAXI, LATEX FREE STRAP IS USED TO PROTECT THE ENDOSCOPE INSERTION TUBE FROM BEING BITTEN BY THE PATIENT.

## (undated) DEVICE — MODEL BT2000 P/N 700287-012KIT CONTENTS: MANIFOLD WITH SALINE AND CONTRAST PORTS, SALINE TUBING WITH SPIKE AND HAND SYRINGE, TRANSDUCER: Brand: BT2000 AUTOMATED MANIFOLD KIT

## (undated) DEVICE — ENDOGATOR AUXILIARY WATER JET CONNECTOR: Brand: ENDOGATOR

## (undated) DEVICE — INTRAOPERATIVE COVER KIT, 10 PACK: Brand: SITE-RITE

## (undated) DEVICE — Device

## (undated) DEVICE — PK CATH CARD 10

## (undated) DEVICE — GLIDESHEATH 0.035" DILATOR HYDROPHILIC COATED INTRODUCER SHEATH: Brand: GLIDESHEATH

## (undated) DEVICE — DEV COMP RAD PRELUDESYNC 24CM

## (undated) DEVICE — CATH TEMPO 5F BER 100CM: Brand: TEMPO

## (undated) DEVICE — LEX NEURO ANGIOGRAPHY: Brand: MEDLINE INDUSTRIES, INC.

## (undated) DEVICE — ST ACC MICROPUNCTURE .018 TRANSLSS/SS/TP 5F/10CM 21G/7CM

## (undated) DEVICE — CATH GUIDE ENVOY MPD 5F0.056IN 100CM

## (undated) DEVICE — ANGIOGRAPHIC CATHETER: Brand: EXPO™

## (undated) DEVICE — MODEL AT P65, P/N 701554-001KIT CONTENTS: HAND CONTROLLER, 3-WAY HIGH-PRESSURE STOPCOCK WITH ROTATING END AND PREMIUM HIGH-PRESSURE TUBING: Brand: ANGIOTOUCH® KIT

## (undated) DEVICE — CONN Y IRR DISP 1P/U

## (undated) DEVICE — RADIFOCUS GLIDEWIRE: Brand: GLIDEWIRE

## (undated) DEVICE — TUBING, SUCTION, 1/4" X 20', STRAIGHT: Brand: MEDLINE INDUSTRIES, INC.

## (undated) DEVICE — RADIFOCUS TORQUE DEVICE MULTI-TORQUE VISE: Brand: RADIFOCUS TORQUE DEVICE

## (undated) DEVICE — SINGLE PORT MANIFOLD: Brand: NEPTUNE 2

## (undated) DEVICE — SKIN PREP TRAY W/CHG: Brand: MEDLINE INDUSTRIES, INC.

## (undated) DEVICE — GW PERIPH GUIDERIGHT STD/EXCHNG/J/TIP SS 0.035IN 5X260CM

## (undated) DEVICE — CATH DIAG EXPO M/ PK 6FR FL4/FR4 PIG 3PK

## (undated) DEVICE — Device: Brand: DEFENDO AIR/WATER/SUCTION AND BIOPSY VALVE

## (undated) DEVICE — LIMB HOLDER, WRIST/ANKLE: Brand: DEROYAL

## (undated) DEVICE — CATH DIAG EXPO .056 FL3.5 6F 100CM

## (undated) DEVICE — SINGLE-USE BIOPSY FORCEPS: Brand: RADIAL JAW 4

## (undated) DEVICE — INTRO SHEATH ART/FEM ENGAGE .038 5F12CM